# Patient Record
Sex: FEMALE | Race: WHITE | Employment: OTHER | URBAN - METROPOLITAN AREA
[De-identification: names, ages, dates, MRNs, and addresses within clinical notes are randomized per-mention and may not be internally consistent; named-entity substitution may affect disease eponyms.]

---

## 2017-01-01 ENCOUNTER — DOCUMENTATION ONLY (OUTPATIENT)
Dept: CARDIOLOGY | Facility: CLINIC | Age: 82
End: 2017-01-01

## 2017-01-01 ENCOUNTER — HOSPITAL ENCOUNTER (INPATIENT)
Facility: CLINIC | Age: 82
LOS: 11 days | DRG: 291 | End: 2017-05-02
Attending: EMERGENCY MEDICINE | Admitting: INTERNAL MEDICINE
Payer: MEDICARE

## 2017-01-01 ENCOUNTER — APPOINTMENT (OUTPATIENT)
Dept: CARDIOLOGY | Facility: CLINIC | Age: 82
End: 2017-01-01
Attending: INTERNAL MEDICINE
Payer: MEDICARE

## 2017-01-01 ENCOUNTER — CARE COORDINATION (OUTPATIENT)
Dept: CARDIOLOGY | Facility: CLINIC | Age: 82
End: 2017-01-01

## 2017-01-01 ENCOUNTER — TELEPHONE (OUTPATIENT)
Dept: CARDIOLOGY | Facility: CLINIC | Age: 82
End: 2017-01-01

## 2017-01-01 ENCOUNTER — APPOINTMENT (OUTPATIENT)
Dept: PHYSICAL THERAPY | Facility: CLINIC | Age: 82
DRG: 291 | End: 2017-01-01
Payer: MEDICARE

## 2017-01-01 ENCOUNTER — ALLIED HEALTH/NURSE VISIT (OUTPATIENT)
Dept: CARDIOLOGY | Facility: CLINIC | Age: 82
End: 2017-01-01

## 2017-01-01 ENCOUNTER — APPOINTMENT (OUTPATIENT)
Dept: PHYSICAL THERAPY | Facility: CLINIC | Age: 82
End: 2017-01-01
Attending: INTERNAL MEDICINE
Payer: MEDICARE

## 2017-01-01 ENCOUNTER — OFFICE VISIT (OUTPATIENT)
Dept: CARDIOLOGY | Facility: CLINIC | Age: 82
End: 2017-01-01
Attending: PHYSICIAN ASSISTANT
Payer: MEDICARE

## 2017-01-01 ENCOUNTER — CARE COORDINATION (OUTPATIENT)
Dept: CARE COORDINATION | Facility: CLINIC | Age: 82
End: 2017-01-01

## 2017-01-01 ENCOUNTER — HOSPITAL ENCOUNTER (OUTPATIENT)
Facility: CLINIC | Age: 82
Setting detail: OBSERVATION
Discharge: HOME | End: 2017-04-19
Attending: EMERGENCY MEDICINE | Admitting: INTERNAL MEDICINE
Payer: MEDICARE

## 2017-01-01 ENCOUNTER — APPOINTMENT (OUTPATIENT)
Dept: GENERAL RADIOLOGY | Facility: CLINIC | Age: 82
DRG: 291 | End: 2017-01-01
Attending: EMERGENCY MEDICINE
Payer: MEDICARE

## 2017-01-01 ENCOUNTER — ALLIED HEALTH/NURSE VISIT (OUTPATIENT)
Dept: CARDIOLOGY | Facility: CLINIC | Age: 82
End: 2017-01-01
Payer: MEDICARE

## 2017-01-01 ENCOUNTER — APPOINTMENT (OUTPATIENT)
Dept: PHYSICAL THERAPY | Facility: CLINIC | Age: 82
DRG: 291 | End: 2017-01-01
Attending: INTERNAL MEDICINE
Payer: MEDICARE

## 2017-01-01 ENCOUNTER — PRE VISIT (OUTPATIENT)
Dept: CARDIOLOGY | Facility: CLINIC | Age: 82
End: 2017-01-01

## 2017-01-01 ENCOUNTER — HOSPITAL ENCOUNTER (OUTPATIENT)
Facility: CLINIC | Age: 82
Discharge: HOME | End: 2017-04-08
Attending: INTERNAL MEDICINE | Admitting: INTERNAL MEDICINE
Payer: MEDICARE

## 2017-01-01 ENCOUNTER — APPOINTMENT (OUTPATIENT)
Dept: GENERAL RADIOLOGY | Facility: CLINIC | Age: 82
DRG: 291 | End: 2017-01-01
Attending: INTERNAL MEDICINE
Payer: MEDICARE

## 2017-01-01 ENCOUNTER — APPOINTMENT (OUTPATIENT)
Dept: GENERAL RADIOLOGY | Facility: CLINIC | Age: 82
End: 2017-01-01
Attending: EMERGENCY MEDICINE
Payer: MEDICARE

## 2017-01-01 ENCOUNTER — CARE COORDINATION (OUTPATIENT)
Dept: LAB | Facility: CLINIC | Age: 82
End: 2017-01-01

## 2017-01-01 ENCOUNTER — OFFICE VISIT (OUTPATIENT)
Dept: CARDIOLOGY | Facility: CLINIC | Age: 82
End: 2017-01-01
Payer: MEDICARE

## 2017-01-01 ENCOUNTER — HOSPITAL ENCOUNTER (OUTPATIENT)
Dept: CARDIOLOGY | Facility: CLINIC | Age: 82
Discharge: HOME | End: 2017-02-09
Attending: PHYSICIAN ASSISTANT | Admitting: PHYSICIAN ASSISTANT
Payer: MEDICARE

## 2017-01-01 ENCOUNTER — APPOINTMENT (OUTPATIENT)
Dept: CT IMAGING | Facility: CLINIC | Age: 82
DRG: 291 | End: 2017-01-01
Attending: EMERGENCY MEDICINE
Payer: MEDICARE

## 2017-01-01 ENCOUNTER — HOSPITAL ENCOUNTER (INPATIENT)
Facility: CLINIC | Age: 82
LOS: 4 days | Discharge: HOME HEALTH | DRG: 291 | End: 2017-01-24
Attending: EMERGENCY MEDICINE | Admitting: HOSPITALIST
Payer: MEDICARE

## 2017-01-01 VITALS
RESPIRATION RATE: 16 BRPM | WEIGHT: 113.3 LBS | BODY MASS INDEX: 20.07 KG/M2 | SYSTOLIC BLOOD PRESSURE: 122 MMHG | HEART RATE: 76 BPM | DIASTOLIC BLOOD PRESSURE: 78 MMHG | HEIGHT: 63 IN

## 2017-01-01 VITALS
SYSTOLIC BLOOD PRESSURE: 137 MMHG | BODY MASS INDEX: 23.54 KG/M2 | HEIGHT: 61 IN | WEIGHT: 124.7 LBS | DIASTOLIC BLOOD PRESSURE: 89 MMHG | HEART RATE: 60 BPM

## 2017-01-01 VITALS
OXYGEN SATURATION: 95 % | SYSTOLIC BLOOD PRESSURE: 116 MMHG | BODY MASS INDEX: 22.01 KG/M2 | WEIGHT: 116.6 LBS | DIASTOLIC BLOOD PRESSURE: 62 MMHG | HEIGHT: 61 IN | HEART RATE: 60 BPM

## 2017-01-01 VITALS
HEART RATE: 70 BPM | WEIGHT: 114 LBS | RESPIRATION RATE: 16 BRPM | BODY MASS INDEX: 20.2 KG/M2 | TEMPERATURE: 98.6 F | DIASTOLIC BLOOD PRESSURE: 91 MMHG | SYSTOLIC BLOOD PRESSURE: 141 MMHG | HEIGHT: 63 IN | OXYGEN SATURATION: 40 %

## 2017-01-01 VITALS
HEART RATE: 60 BPM | BODY MASS INDEX: 22.48 KG/M2 | DIASTOLIC BLOOD PRESSURE: 70 MMHG | SYSTOLIC BLOOD PRESSURE: 112 MMHG | HEIGHT: 61 IN | WEIGHT: 119.1 LBS

## 2017-01-01 VITALS
HEART RATE: 96 BPM | OXYGEN SATURATION: 93 % | TEMPERATURE: 97.9 F | RESPIRATION RATE: 16 BRPM | SYSTOLIC BLOOD PRESSURE: 149 MMHG | WEIGHT: 115.8 LBS | HEIGHT: 61 IN | BODY MASS INDEX: 21.86 KG/M2 | DIASTOLIC BLOOD PRESSURE: 63 MMHG

## 2017-01-01 VITALS
HEIGHT: 61 IN | HEART RATE: 68 BPM | BODY MASS INDEX: 23.39 KG/M2 | DIASTOLIC BLOOD PRESSURE: 72 MMHG | WEIGHT: 123.9 LBS | SYSTOLIC BLOOD PRESSURE: 118 MMHG

## 2017-01-01 VITALS
OXYGEN SATURATION: 96 % | RESPIRATION RATE: 17 BRPM | SYSTOLIC BLOOD PRESSURE: 111 MMHG | BODY MASS INDEX: 18.63 KG/M2 | DIASTOLIC BLOOD PRESSURE: 60 MMHG | TEMPERATURE: 98 F | HEART RATE: 90 BPM | WEIGHT: 105.16 LBS

## 2017-01-01 VITALS
WEIGHT: 124.12 LBS | RESPIRATION RATE: 16 BRPM | BODY MASS INDEX: 23.43 KG/M2 | HEIGHT: 61 IN | SYSTOLIC BLOOD PRESSURE: 140 MMHG | DIASTOLIC BLOOD PRESSURE: 73 MMHG | HEART RATE: 108 BPM | OXYGEN SATURATION: 95 % | TEMPERATURE: 97 F

## 2017-01-01 VITALS
WEIGHT: 124.2 LBS | HEART RATE: 59 BPM | DIASTOLIC BLOOD PRESSURE: 91 MMHG | SYSTOLIC BLOOD PRESSURE: 115 MMHG | BODY MASS INDEX: 23.45 KG/M2 | HEIGHT: 61 IN

## 2017-01-01 VITALS
DIASTOLIC BLOOD PRESSURE: 89 MMHG | SYSTOLIC BLOOD PRESSURE: 137 MMHG | HEIGHT: 61 IN | HEART RATE: 60 BPM | BODY MASS INDEX: 23.41 KG/M2 | WEIGHT: 124 LBS

## 2017-01-01 VITALS
SYSTOLIC BLOOD PRESSURE: 142 MMHG | HEART RATE: 66 BPM | DIASTOLIC BLOOD PRESSURE: 78 MMHG | WEIGHT: 119.4 LBS | HEIGHT: 61 IN | BODY MASS INDEX: 22.54 KG/M2

## 2017-01-01 DIAGNOSIS — I10 ESSENTIAL HYPERTENSION: ICD-10-CM

## 2017-01-01 DIAGNOSIS — I48.19 PERSISTENT ATRIAL FIBRILLATION (H): ICD-10-CM

## 2017-01-01 DIAGNOSIS — I48.0 PAROXYSMAL A-FIB (H): Primary | ICD-10-CM

## 2017-01-01 DIAGNOSIS — I44.2 CHB (COMPLETE HEART BLOCK) (H): ICD-10-CM

## 2017-01-01 DIAGNOSIS — R53.81 PHYSICAL DECONDITIONING: Primary | ICD-10-CM

## 2017-01-01 DIAGNOSIS — I50.32 CHRONIC DIASTOLIC CHF (CONGESTIVE HEART FAILURE) (H): Primary | ICD-10-CM

## 2017-01-01 DIAGNOSIS — Z95.0 CARDIAC PACEMAKER IN SITU: ICD-10-CM

## 2017-01-01 DIAGNOSIS — I50.31 ACUTE DIASTOLIC CONGESTIVE HEART FAILURE (H): Primary | ICD-10-CM

## 2017-01-01 DIAGNOSIS — Z95.0 PACEMAKER: Primary | ICD-10-CM

## 2017-01-01 DIAGNOSIS — R06.03 ACUTE RESPIRATORY DISTRESS: ICD-10-CM

## 2017-01-01 DIAGNOSIS — Z95.0 CARDIAC PACEMAKER IN SITU: Primary | ICD-10-CM

## 2017-01-01 DIAGNOSIS — I50.32 CHRONIC DIASTOLIC CONGESTIVE HEART FAILURE (H): Primary | ICD-10-CM

## 2017-01-01 DIAGNOSIS — I48.0 PAROXYSMAL A-FIB (H): ICD-10-CM

## 2017-01-01 DIAGNOSIS — Z95.0 PACEMAKER: ICD-10-CM

## 2017-01-01 DIAGNOSIS — I50.31 ACUTE DIASTOLIC CONGESTIVE HEART FAILURE (H): ICD-10-CM

## 2017-01-01 DIAGNOSIS — I50.33 ACUTE ON CHRONIC DIASTOLIC CONGESTIVE HEART FAILURE (H): Primary | ICD-10-CM

## 2017-01-01 DIAGNOSIS — R55 SYNCOPE, UNSPECIFIED SYNCOPE TYPE: ICD-10-CM

## 2017-01-01 DIAGNOSIS — I49.5 SSS (SICK SINUS SYNDROME) (H): ICD-10-CM

## 2017-01-01 DIAGNOSIS — I35.0 NONRHEUMATIC AORTIC VALVE STENOSIS: ICD-10-CM

## 2017-01-01 DIAGNOSIS — I50.32 CHRONIC DIASTOLIC CHF (CONGESTIVE HEART FAILURE) (H): ICD-10-CM

## 2017-01-01 DIAGNOSIS — I50.9 CHF (CONGESTIVE HEART FAILURE) (H): ICD-10-CM

## 2017-01-01 DIAGNOSIS — A41.9 SEVERE SEPSIS (H): ICD-10-CM

## 2017-01-01 DIAGNOSIS — R09.02 HYPOXIA: ICD-10-CM

## 2017-01-01 DIAGNOSIS — I48.0 PAROXYSMAL ATRIAL FIBRILLATION (H): Primary | ICD-10-CM

## 2017-01-01 DIAGNOSIS — I07.1 SEVERE TRICUSPID REGURGITATION: ICD-10-CM

## 2017-01-01 DIAGNOSIS — I34.0 NON-RHEUMATIC MITRAL REGURGITATION: ICD-10-CM

## 2017-01-01 DIAGNOSIS — I50.9 CONGESTIVE HEART FAILURE, UNSPECIFIED CONGESTIVE HEART FAILURE CHRONICITY, UNSPECIFIED CONGESTIVE HEART FAILURE TYPE: ICD-10-CM

## 2017-01-01 DIAGNOSIS — I48.19 PERSISTENT ATRIAL FIBRILLATION (H): Primary | ICD-10-CM

## 2017-01-01 DIAGNOSIS — I49.5 SINUS NODE DYSFUNCTION (H): ICD-10-CM

## 2017-01-01 DIAGNOSIS — I50.32 CHRONIC DIASTOLIC CONGESTIVE HEART FAILURE (H): ICD-10-CM

## 2017-01-01 DIAGNOSIS — I48.91 ATRIAL FIBRILLATION (H): Primary | ICD-10-CM

## 2017-01-01 DIAGNOSIS — J18.9 PNEUMONIA OF BOTH LUNGS DUE TO INFECTIOUS ORGANISM, UNSPECIFIED PART OF LUNG: ICD-10-CM

## 2017-01-01 DIAGNOSIS — R65.20 SEVERE SEPSIS (H): ICD-10-CM

## 2017-01-01 DIAGNOSIS — I50.9 CHF (CONGESTIVE HEART FAILURE) (H): Primary | ICD-10-CM

## 2017-01-01 LAB
ALBUMIN SERPL-MCNC: 2.9 G/DL (ref 3.4–5)
ALBUMIN SERPL-MCNC: 3.5 G/DL (ref 3.4–5)
ALBUMIN SERPL-MCNC: 3.6 G/DL (ref 3.4–5)
ALBUMIN SERPL-MCNC: 3.7 G/DL (ref 3.4–5)
ALBUMIN SERPL-MCNC: NORMAL G/DL (ref 3.4–5)
ALBUMIN UR-MCNC: 10 MG/DL
ALP SERPL-CCNC: 101 U/L (ref 40–150)
ALP SERPL-CCNC: 102 U/L (ref 40–150)
ALP SERPL-CCNC: 121 U/L (ref 40–150)
ALP SERPL-CCNC: 98 U/L (ref 40–150)
ALP SERPL-CCNC: NORMAL U/L (ref 40–150)
ALT SERPL W P-5'-P-CCNC: 34 U/L (ref 0–50)
ALT SERPL W P-5'-P-CCNC: 35 U/L (ref 0–50)
ALT SERPL W P-5'-P-CCNC: 39 U/L (ref 0–50)
ALT SERPL W P-5'-P-CCNC: 44 U/L (ref 0–50)
ALT SERPL W P-5'-P-CCNC: NORMAL U/L (ref 0–50)
ANION GAP SERPL CALCULATED.3IONS-SCNC: 10 MMOL/L (ref 3–14)
ANION GAP SERPL CALCULATED.3IONS-SCNC: 11 MMOL/L (ref 3–14)
ANION GAP SERPL CALCULATED.3IONS-SCNC: 11 MMOL/L (ref 3–14)
ANION GAP SERPL CALCULATED.3IONS-SCNC: 11.7 MMOL/L (ref 6–17)
ANION GAP SERPL CALCULATED.3IONS-SCNC: 12 MMOL/L (ref 3–14)
ANION GAP SERPL CALCULATED.3IONS-SCNC: 12.7 MMOL/L (ref 6–17)
ANION GAP SERPL CALCULATED.3IONS-SCNC: 4 MMOL/L (ref 3–14)
ANION GAP SERPL CALCULATED.3IONS-SCNC: 5 MMOL/L (ref 3–14)
ANION GAP SERPL CALCULATED.3IONS-SCNC: 7 MMOL/L (ref 3–14)
ANION GAP SERPL CALCULATED.3IONS-SCNC: 8 MMOL/L (ref 3–14)
ANION GAP SERPL CALCULATED.3IONS-SCNC: 9 MMOL/L (ref 3–14)
ANION GAP SERPL CALCULATED.3IONS-SCNC: 9.4 MMOL/L (ref 6–17)
ANION GAP SERPL CALCULATED.3IONS-SCNC: 9.6 MMOL/L (ref 6–17)
ANION GAP SERPL CALCULATED.3IONS-SCNC: NORMAL MMOL/L (ref 6–17)
ANION GAP SERPL CALCULATED.3IONS-SCNC: NORMAL MMOL/L (ref 6–17)
APPEARANCE UR: CLEAR
APTT PPP: 29 SEC (ref 22–37)
APTT PPP: NORMAL SEC (ref 22–37)
AST SERPL W P-5'-P-CCNC: 36 U/L (ref 0–45)
AST SERPL W P-5'-P-CCNC: 44 U/L (ref 0–45)
AST SERPL W P-5'-P-CCNC: 45 U/L (ref 0–45)
AST SERPL W P-5'-P-CCNC: 46 U/L (ref 0–45)
AST SERPL W P-5'-P-CCNC: NORMAL U/L (ref 0–45)
BACTERIA SPEC CULT: NO GROWTH
BACTERIA SPEC CULT: NO GROWTH
BASE DEFICIT BLDA-SCNC: 0.7 MMOL/L
BASOPHILS # BLD AUTO: 0 10E9/L (ref 0–0.2)
BASOPHILS # BLD AUTO: 0 10E9/L (ref 0–0.2)
BASOPHILS # BLD AUTO: 0.2 10E9/L (ref 0–0.2)
BASOPHILS NFR BLD AUTO: 0.2 %
BASOPHILS NFR BLD AUTO: 0.3 %
BASOPHILS NFR BLD AUTO: 1 %
BILIRUB SERPL-MCNC: 0.6 MG/DL (ref 0.2–1.3)
BILIRUB SERPL-MCNC: 0.7 MG/DL (ref 0.2–1.3)
BILIRUB SERPL-MCNC: 0.9 MG/DL (ref 0.2–1.3)
BILIRUB SERPL-MCNC: 0.9 MG/DL (ref 0.2–1.3)
BILIRUB SERPL-MCNC: NORMAL MG/DL (ref 0.2–1.3)
BILIRUB UR QL STRIP: NEGATIVE
BUN SERPL-MCNC: 21 MG/DL (ref 7–30)
BUN SERPL-MCNC: 21 MG/DL (ref 7–30)
BUN SERPL-MCNC: 22 MG/DL (ref 7–30)
BUN SERPL-MCNC: 23 MG/DL (ref 7–30)
BUN SERPL-MCNC: 24 MG/DL (ref 7–30)
BUN SERPL-MCNC: 24 MG/DL (ref 7–30)
BUN SERPL-MCNC: 25 MG/DL (ref 7–30)
BUN SERPL-MCNC: 27 MG/DL (ref 7–30)
BUN SERPL-MCNC: 27 MG/DL (ref 7–30)
BUN SERPL-MCNC: 29 MG/DL (ref 7–30)
BUN SERPL-MCNC: 30 MG/DL (ref 7–30)
BUN SERPL-MCNC: 30 MG/DL (ref 7–30)
BUN SERPL-MCNC: 31 MG/DL (ref 7–30)
BUN SERPL-MCNC: 31 MG/DL (ref 7–30)
BUN SERPL-MCNC: 32 MG/DL (ref 7–30)
BUN SERPL-MCNC: 32 MG/DL (ref 7–30)
BUN SERPL-MCNC: 35 MG/DL (ref 7–30)
BUN SERPL-MCNC: 37 MG/DL (ref 7–30)
BUN SERPL-MCNC: 42 MG/DL (ref 7–30)
BUN SERPL-MCNC: NORMAL MG/DL (ref 7–30)
BUN SERPL-MCNC: NORMAL MG/DL (ref 7–30)
CALCIUM SERPL-MCNC: 10 MG/DL (ref 8.5–10.5)
CALCIUM SERPL-MCNC: 10.1 MG/DL (ref 8.5–10.5)
CALCIUM SERPL-MCNC: 7.9 MG/DL (ref 8.5–10.1)
CALCIUM SERPL-MCNC: 8.5 MG/DL (ref 8.5–10.1)
CALCIUM SERPL-MCNC: 8.7 MG/DL (ref 8.5–10.1)
CALCIUM SERPL-MCNC: 8.8 MG/DL (ref 8.5–10.1)
CALCIUM SERPL-MCNC: 8.9 MG/DL (ref 8.5–10.1)
CALCIUM SERPL-MCNC: 9 MG/DL (ref 8.5–10.1)
CALCIUM SERPL-MCNC: 9 MG/DL (ref 8.5–10.1)
CALCIUM SERPL-MCNC: 9.1 MG/DL (ref 8.5–10.1)
CALCIUM SERPL-MCNC: 9.2 MG/DL (ref 8.5–10.1)
CALCIUM SERPL-MCNC: 9.2 MG/DL (ref 8.5–10.1)
CALCIUM SERPL-MCNC: 9.3 MG/DL (ref 8.5–10.1)
CALCIUM SERPL-MCNC: 9.5 MG/DL (ref 8.5–10.5)
CALCIUM SERPL-MCNC: 9.9 MG/DL (ref 8.5–10.5)
CALCIUM SERPL-MCNC: NORMAL MG/DL (ref 8.5–10.1)
CALCIUM SERPL-MCNC: NORMAL MG/DL (ref 8.5–10.1)
CHLORIDE SERPL-SCNC: 100 MMOL/L (ref 94–109)
CHLORIDE SERPL-SCNC: 100 MMOL/L (ref 98–107)
CHLORIDE SERPL-SCNC: 102 MMOL/L (ref 94–109)
CHLORIDE SERPL-SCNC: 102 MMOL/L (ref 94–109)
CHLORIDE SERPL-SCNC: 103 MMOL/L (ref 94–109)
CHLORIDE SERPL-SCNC: 103 MMOL/L (ref 98–107)
CHLORIDE SERPL-SCNC: 104 MMOL/L (ref 94–109)
CHLORIDE SERPL-SCNC: 104 MMOL/L (ref 94–109)
CHLORIDE SERPL-SCNC: 107 MMOL/L (ref 94–109)
CHLORIDE SERPL-SCNC: 96 MMOL/L (ref 98–107)
CHLORIDE SERPL-SCNC: 96 MMOL/L (ref 98–107)
CHLORIDE SERPL-SCNC: 98 MMOL/L (ref 94–109)
CHLORIDE SERPL-SCNC: 98 MMOL/L (ref 94–109)
CHLORIDE SERPL-SCNC: 99 MMOL/L (ref 94–109)
CHLORIDE SERPL-SCNC: 99 MMOL/L (ref 94–109)
CHLORIDE SERPL-SCNC: NORMAL MMOL/L (ref 94–109)
CHLORIDE SERPL-SCNC: NORMAL MMOL/L (ref 94–109)
CO2 BLDCOV-SCNC: 26 MMOL/L (ref 21–28)
CO2 SERPL-SCNC: 23 MMOL/L (ref 20–32)
CO2 SERPL-SCNC: 23 MMOL/L (ref 20–32)
CO2 SERPL-SCNC: 24 MMOL/L (ref 20–32)
CO2 SERPL-SCNC: 26 MMOL/L (ref 20–32)
CO2 SERPL-SCNC: 27 MMOL/L (ref 20–32)
CO2 SERPL-SCNC: 28 MMOL/L (ref 20–32)
CO2 SERPL-SCNC: 29 MMOL/L (ref 20–32)
CO2 SERPL-SCNC: 29 MMOL/L (ref 23–29)
CO2 SERPL-SCNC: 31 MMOL/L (ref 20–32)
CO2 SERPL-SCNC: 32 MMOL/L (ref 20–32)
CO2 SERPL-SCNC: 33 MMOL/L (ref 20–32)
CO2 SERPL-SCNC: 33 MMOL/L (ref 20–32)
CO2 SERPL-SCNC: 33 MMOL/L (ref 23–29)
CO2 SERPL-SCNC: NORMAL MMOL/L (ref 20–32)
CO2 SERPL-SCNC: NORMAL MMOL/L (ref 20–32)
COLOR UR AUTO: ABNORMAL
CREAT SERPL-MCNC: 0.92 MG/DL (ref 0.52–1.04)
CREAT SERPL-MCNC: 0.99 MG/DL (ref 0.52–1.04)
CREAT SERPL-MCNC: 1.01 MG/DL (ref 0.52–1.04)
CREAT SERPL-MCNC: 1.03 MG/DL (ref 0.52–1.04)
CREAT SERPL-MCNC: 1.05 MG/DL (ref 0.52–1.04)
CREAT SERPL-MCNC: 1.06 MG/DL (ref 0.52–1.04)
CREAT SERPL-MCNC: 1.07 MG/DL (ref 0.52–1.04)
CREAT SERPL-MCNC: 1.11 MG/DL (ref 0.52–1.04)
CREAT SERPL-MCNC: 1.14 MG/DL (ref 0.52–1.04)
CREAT SERPL-MCNC: 1.15 MG/DL (ref 0.52–1.04)
CREAT SERPL-MCNC: 1.15 MG/DL (ref 0.52–1.04)
CREAT SERPL-MCNC: 1.19 MG/DL (ref 0.7–1.3)
CREAT SERPL-MCNC: 1.23 MG/DL (ref 0.52–1.04)
CREAT SERPL-MCNC: 1.27 MG/DL (ref 0.52–1.04)
CREAT SERPL-MCNC: 1.3 MG/DL (ref 0.7–1.3)
CREAT SERPL-MCNC: 1.33 MG/DL (ref 0.52–1.04)
CREAT SERPL-MCNC: 1.33 MG/DL (ref 0.52–1.04)
CREAT SERPL-MCNC: 1.34 MG/DL (ref 0.52–1.04)
CREAT SERPL-MCNC: 1.36 MG/DL (ref 0.7–1.3)
CREAT SERPL-MCNC: 1.59 MG/DL (ref 0.7–1.3)
CREAT SERPL-MCNC: 1.63 MG/DL (ref 0.52–1.04)
CREAT SERPL-MCNC: NORMAL MG/DL (ref 0.52–1.04)
CREAT SERPL-MCNC: NORMAL MG/DL (ref 0.52–1.04)
DIFFERENTIAL METHOD BLD: ABNORMAL
DIFFERENTIAL METHOD BLD: NORMAL
EOSINOPHIL # BLD AUTO: 0 10E9/L (ref 0–0.7)
EOSINOPHIL # BLD AUTO: 0.1 10E9/L (ref 0–0.7)
EOSINOPHIL # BLD AUTO: 0.1 10E9/L (ref 0–0.7)
EOSINOPHIL NFR BLD AUTO: 0 %
EOSINOPHIL NFR BLD AUTO: 0.7 %
EOSINOPHIL NFR BLD AUTO: 1.4 %
ERYTHROCYTE [DISTWIDTH] IN BLOOD BY AUTOMATED COUNT: 14.9 % (ref 10–15)
ERYTHROCYTE [DISTWIDTH] IN BLOOD BY AUTOMATED COUNT: 15.8 % (ref 10–15)
ERYTHROCYTE [DISTWIDTH] IN BLOOD BY AUTOMATED COUNT: 15.9 % (ref 10–15)
ERYTHROCYTE [DISTWIDTH] IN BLOOD BY AUTOMATED COUNT: 16 % (ref 10–15)
ERYTHROCYTE [DISTWIDTH] IN BLOOD BY AUTOMATED COUNT: 16 % (ref 10–15)
ERYTHROCYTE [DISTWIDTH] IN BLOOD BY AUTOMATED COUNT: 16.2 % (ref 10–15)
ERYTHROCYTE [DISTWIDTH] IN BLOOD BY AUTOMATED COUNT: 16.3 % (ref 10–15)
ERYTHROCYTE [DISTWIDTH] IN BLOOD BY AUTOMATED COUNT: 16.4 % (ref 10–15)
ERYTHROCYTE [DISTWIDTH] IN BLOOD BY AUTOMATED COUNT: 16.8 % (ref 10–15)
ERYTHROCYTE [DISTWIDTH] IN BLOOD BY AUTOMATED COUNT: 17 % (ref 10–15)
ERYTHROCYTE [DISTWIDTH] IN BLOOD BY AUTOMATED COUNT: NORMAL % (ref 10–15)
GFR SERPL CREATININE-BSD FRML MDRD: 30 ML/MIN/1.7M2
GFR SERPL CREATININE-BSD FRML MDRD: 31 ML/MIN/1.7M2
GFR SERPL CREATININE-BSD FRML MDRD: 37 ML/MIN/1.7M2
GFR SERPL CREATININE-BSD FRML MDRD: 37 ML/MIN/1.7M2
GFR SERPL CREATININE-BSD FRML MDRD: 38 ML/MIN/1.7M2
GFR SERPL CREATININE-BSD FRML MDRD: 38 ML/MIN/1.7M2
GFR SERPL CREATININE-BSD FRML MDRD: 39 ML/MIN/1.7M2
GFR SERPL CREATININE-BSD FRML MDRD: 40 ML/MIN/1.7M2
GFR SERPL CREATININE-BSD FRML MDRD: 41 ML/MIN/1.7M2
GFR SERPL CREATININE-BSD FRML MDRD: 43 ML/MIN/1.7M2
GFR SERPL CREATININE-BSD FRML MDRD: 44 ML/MIN/1.7M2
GFR SERPL CREATININE-BSD FRML MDRD: 44 ML/MIN/1.7M2
GFR SERPL CREATININE-BSD FRML MDRD: 45 ML/MIN/1.7M2
GFR SERPL CREATININE-BSD FRML MDRD: 46 ML/MIN/1.7M2
GFR SERPL CREATININE-BSD FRML MDRD: 48 ML/MIN/1.7M2
GFR SERPL CREATININE-BSD FRML MDRD: 49 ML/MIN/1.7M2
GFR SERPL CREATININE-BSD FRML MDRD: 49 ML/MIN/1.7M2
GFR SERPL CREATININE-BSD FRML MDRD: 50 ML/MIN/1.7M2
GFR SERPL CREATININE-BSD FRML MDRD: 52 ML/MIN/1.7M2
GFR SERPL CREATININE-BSD FRML MDRD: 53 ML/MIN/1.7M2
GFR SERPL CREATININE-BSD FRML MDRD: 58 ML/MIN/1.7M2
GFR SERPL CREATININE-BSD FRML MDRD: NORMAL ML/MIN/1.7M2
GFR SERPL CREATININE-BSD FRML MDRD: NORMAL ML/MIN/1.7M2
GLUCOSE BLDC GLUCOMTR-MCNC: 138 MG/DL (ref 70–99)
GLUCOSE BLDC GLUCOMTR-MCNC: 139 MG/DL (ref 70–99)
GLUCOSE BLDC GLUCOMTR-MCNC: 80 MG/DL (ref 70–99)
GLUCOSE SERPL-MCNC: 101 MG/DL (ref 70–99)
GLUCOSE SERPL-MCNC: 111 MG/DL (ref 70–99)
GLUCOSE SERPL-MCNC: 111 MG/DL (ref 70–99)
GLUCOSE SERPL-MCNC: 120 MG/DL (ref 70–105)
GLUCOSE SERPL-MCNC: 125 MG/DL (ref 70–99)
GLUCOSE SERPL-MCNC: 152 MG/DL (ref 70–99)
GLUCOSE SERPL-MCNC: 157 MG/DL (ref 70–99)
GLUCOSE SERPL-MCNC: 79 MG/DL (ref 70–99)
GLUCOSE SERPL-MCNC: 81 MG/DL (ref 70–99)
GLUCOSE SERPL-MCNC: 83 MG/DL (ref 70–99)
GLUCOSE SERPL-MCNC: 86 MG/DL (ref 70–99)
GLUCOSE SERPL-MCNC: 87 MG/DL (ref 70–99)
GLUCOSE SERPL-MCNC: 87 MG/DL (ref 70–99)
GLUCOSE SERPL-MCNC: 88 MG/DL (ref 70–99)
GLUCOSE SERPL-MCNC: 89 MG/DL (ref 70–105)
GLUCOSE SERPL-MCNC: 97 MG/DL (ref 70–105)
GLUCOSE SERPL-MCNC: 97 MG/DL (ref 70–99)
GLUCOSE SERPL-MCNC: 98 MG/DL (ref 70–105)
GLUCOSE SERPL-MCNC: 99 MG/DL (ref 70–99)
GLUCOSE SERPL-MCNC: NORMAL MG/DL (ref 70–99)
GLUCOSE SERPL-MCNC: NORMAL MG/DL (ref 70–99)
GLUCOSE UR STRIP-MCNC: NEGATIVE MG/DL
HBA1C MFR BLD: 5.1 % (ref 4.3–6)
HCO3 BLD-SCNC: 23 MMOL/L (ref 21–28)
HCT VFR BLD AUTO: 34.2 % (ref 35–47)
HCT VFR BLD AUTO: 34.4 % (ref 35–47)
HCT VFR BLD AUTO: 34.5 % (ref 35–47)
HCT VFR BLD AUTO: 35 % (ref 35–47)
HCT VFR BLD AUTO: 35.1 % (ref 35–47)
HCT VFR BLD AUTO: 35.3 % (ref 35–47)
HCT VFR BLD AUTO: 35.9 % (ref 35–47)
HCT VFR BLD AUTO: 39.7 % (ref 35–47)
HCT VFR BLD AUTO: 40.1 % (ref 35–47)
HCT VFR BLD AUTO: 40.7 % (ref 35–47)
HCT VFR BLD AUTO: 41.3 % (ref 35–47)
HCT VFR BLD AUTO: 49.5 % (ref 35–47)
HCT VFR BLD AUTO: NORMAL % (ref 35–47)
HGB BLD-MCNC: 11.3 G/DL (ref 11.7–15.7)
HGB BLD-MCNC: 11.3 G/DL (ref 11.7–15.7)
HGB BLD-MCNC: 11.6 G/DL (ref 11.7–15.7)
HGB BLD-MCNC: 11.8 G/DL (ref 11.7–15.7)
HGB BLD-MCNC: 12 G/DL (ref 11.7–15.7)
HGB BLD-MCNC: 13.1 G/DL (ref 11.7–15.7)
HGB BLD-MCNC: 13.6 G/DL (ref 11.7–15.7)
HGB BLD-MCNC: 13.7 G/DL (ref 11.7–15.7)
HGB BLD-MCNC: 13.8 G/DL (ref 11.7–15.7)
HGB BLD-MCNC: 16.7 G/DL (ref 11.7–15.7)
HGB BLD-MCNC: NORMAL G/DL (ref 11.7–15.7)
HGB UR QL STRIP: ABNORMAL
IMM GRANULOCYTES # BLD: 0 10E9/L (ref 0–0.4)
IMM GRANULOCYTES # BLD: 0 10E9/L (ref 0–0.4)
IMM GRANULOCYTES NFR BLD: 0.2 %
IMM GRANULOCYTES NFR BLD: 0.3 %
INR PPP: 1.16 (ref 0.86–1.14)
INR PPP: 1.38 (ref 0.86–1.14)
INR PPP: NORMAL (ref 0.86–1.14)
INTERPRETATION ECG - MUSE: NORMAL
KETONES UR STRIP-MCNC: NEGATIVE MG/DL
LACTATE BLD-SCNC: 1.2 MMOL/L (ref 0.7–2.1)
LACTATE BLD-SCNC: 1.2 MMOL/L (ref 0.7–2.1)
LACTATE BLD-SCNC: 1.5 MMOL/L (ref 0.7–2.1)
LACTATE BLD-SCNC: 2 MMOL/L (ref 0.7–2.1)
LACTATE BLD-SCNC: 3.4 MMOL/L (ref 0.7–2.1)
LEUKOCYTE ESTERASE UR QL STRIP: NEGATIVE
LYMPHOCYTES # BLD AUTO: 2.3 10E9/L (ref 0.8–5.3)
LYMPHOCYTES # BLD AUTO: 2.3 10E9/L (ref 0.8–5.3)
LYMPHOCYTES # BLD AUTO: 6.9 10E9/L (ref 0.8–5.3)
LYMPHOCYTES NFR BLD AUTO: 19.5 %
LYMPHOCYTES NFR BLD AUTO: 24.5 %
LYMPHOCYTES NFR BLD AUTO: 39 %
Lab: NORMAL
Lab: NORMAL
MAGNESIUM SERPL-MCNC: 2 MG/DL (ref 1.6–2.3)
MAGNESIUM SERPL-MCNC: 2.2 MG/DL (ref 1.6–2.3)
MCH RBC QN AUTO: 32 PG (ref 26.5–33)
MCH RBC QN AUTO: 32.1 PG (ref 26.5–33)
MCH RBC QN AUTO: 32.3 PG (ref 26.5–33)
MCH RBC QN AUTO: 32.7 PG (ref 26.5–33)
MCH RBC QN AUTO: 32.8 PG (ref 26.5–33)
MCH RBC QN AUTO: 32.9 PG (ref 26.5–33)
MCH RBC QN AUTO: 32.9 PG (ref 26.5–33)
MCH RBC QN AUTO: 33 PG (ref 26.5–33)
MCH RBC QN AUTO: 33.1 PG (ref 26.5–33)
MCH RBC QN AUTO: 33.3 PG (ref 26.5–33)
MCH RBC QN AUTO: 33.4 PG (ref 26.5–33)
MCH RBC QN AUTO: 33.7 PG (ref 26.5–33)
MCH RBC QN AUTO: NORMAL PG (ref 26.5–33)
MCHC RBC AUTO-ENTMCNC: 32.7 G/DL (ref 31.5–36.5)
MCHC RBC AUTO-ENTMCNC: 32.8 G/DL (ref 31.5–36.5)
MCHC RBC AUTO-ENTMCNC: 32.9 G/DL (ref 31.5–36.5)
MCHC RBC AUTO-ENTMCNC: 33 G/DL (ref 31.5–36.5)
MCHC RBC AUTO-ENTMCNC: 33.4 G/DL (ref 31.5–36.5)
MCHC RBC AUTO-ENTMCNC: 33.6 G/DL (ref 31.5–36.5)
MCHC RBC AUTO-ENTMCNC: 33.6 G/DL (ref 31.5–36.5)
MCHC RBC AUTO-ENTMCNC: 33.7 G/DL (ref 31.5–36.5)
MCHC RBC AUTO-ENTMCNC: 34 G/DL (ref 31.5–36.5)
MCHC RBC AUTO-ENTMCNC: 34.3 G/DL (ref 31.5–36.5)
MCHC RBC AUTO-ENTMCNC: NORMAL G/DL (ref 31.5–36.5)
MCV RBC AUTO: 100 FL (ref 78–100)
MCV RBC AUTO: 97 FL (ref 78–100)
MCV RBC AUTO: 97 FL (ref 78–100)
MCV RBC AUTO: 98 FL (ref 78–100)
MCV RBC AUTO: 99 FL (ref 78–100)
MCV RBC AUTO: NORMAL FL (ref 78–100)
MICRO REPORT STATUS: NORMAL
MICRO REPORT STATUS: NORMAL
MONOCYTES # BLD AUTO: 0.9 10E9/L (ref 0–1.3)
MONOCYTES # BLD AUTO: 0.9 10E9/L (ref 0–1.3)
MONOCYTES # BLD AUTO: 1.2 10E9/L (ref 0–1.3)
MONOCYTES NFR BLD AUTO: 12.8 %
MONOCYTES NFR BLD AUTO: 5 %
MONOCYTES NFR BLD AUTO: 7.7 %
MUCOUS THREADS #/AREA URNS LPF: PRESENT /LPF
NEUTROPHILS # BLD AUTO: 5.7 10E9/L (ref 1.6–8.3)
NEUTROPHILS # BLD AUTO: 8.3 10E9/L (ref 1.6–8.3)
NEUTROPHILS # BLD AUTO: 9.7 10E9/L (ref 1.6–8.3)
NEUTROPHILS NFR BLD AUTO: 55 %
NEUTROPHILS NFR BLD AUTO: 60.9 %
NEUTROPHILS NFR BLD AUTO: 71.5 %
NITRATE UR QL: NEGATIVE
NRBC # BLD AUTO: 0 10*3/UL
NRBC # BLD AUTO: 0 10*3/UL
NRBC BLD AUTO-RTO: 0 /100
NRBC BLD AUTO-RTO: 0 /100
NT-PROBNP SERPL-MCNC: 1990 PG/ML (ref 0–1800)
NT-PROBNP SERPL-MCNC: 3778 PG/ML (ref 0–1800)
NT-PROBNP SERPL-MCNC: ABNORMAL PG/ML (ref 0–1800)
NT-PROBNP SERPL-MCNC: NORMAL PG/ML (ref 0–1800)
NT-PROBNP SERPL-MCNC: NORMAL PG/ML (ref 0–1800)
O2/TOTAL GAS SETTING VFR VENT: 100 %
OXYHGB MFR BLD: 96 % (ref 92–100)
PCO2 BLD: 34 MM HG (ref 35–45)
PCO2 BLDV: 62 MM HG (ref 40–50)
PH BLD: 7.44 PH (ref 7.35–7.45)
PH BLDV: 7.23 PH (ref 7.32–7.43)
PH UR STRIP: 5.5 PH (ref 5–7)
PLATELET # BLD AUTO: 148 10E9/L (ref 150–450)
PLATELET # BLD AUTO: 148 10E9/L (ref 150–450)
PLATELET # BLD AUTO: 174 10E9/L (ref 150–450)
PLATELET # BLD AUTO: 178 10E9/L (ref 150–450)
PLATELET # BLD AUTO: 182 10E9/L (ref 150–450)
PLATELET # BLD AUTO: 184 10E9/L (ref 150–450)
PLATELET # BLD AUTO: 193 10E9/L (ref 150–450)
PLATELET # BLD AUTO: 194 10E9/L (ref 150–450)
PLATELET # BLD AUTO: 197 10E9/L (ref 150–450)
PLATELET # BLD AUTO: 210 10E9/L (ref 150–450)
PLATELET # BLD AUTO: 237 10E9/L (ref 150–450)
PLATELET # BLD AUTO: 246 10E9/L (ref 150–450)
PLATELET # BLD AUTO: NORMAL 10E9/L (ref 150–450)
PLATELET # BLD EST: ABNORMAL 10*3/UL
PO2 BLD: 85 MM HG (ref 80–105)
PO2 BLDV: 49 MM HG (ref 25–47)
POTASSIUM SERPL-SCNC: 3.1 MMOL/L (ref 3.4–5.3)
POTASSIUM SERPL-SCNC: 3.2 MMOL/L (ref 3.4–5.3)
POTASSIUM SERPL-SCNC: 3.3 MMOL/L (ref 3.4–5.3)
POTASSIUM SERPL-SCNC: 3.5 MMOL/L (ref 3.4–5.3)
POTASSIUM SERPL-SCNC: 3.5 MMOL/L (ref 3.4–5.3)
POTASSIUM SERPL-SCNC: 3.6 MMOL/L (ref 3.4–5.3)
POTASSIUM SERPL-SCNC: 3.7 MMOL/L (ref 3.4–5.3)
POTASSIUM SERPL-SCNC: 3.7 MMOL/L (ref 3.5–5.1)
POTASSIUM SERPL-SCNC: 3.8 MMOL/L (ref 3.4–5.3)
POTASSIUM SERPL-SCNC: 3.9 MMOL/L (ref 3.4–5.3)
POTASSIUM SERPL-SCNC: 4 MMOL/L (ref 3.4–5.3)
POTASSIUM SERPL-SCNC: 4.1 MMOL/L (ref 3.4–5.3)
POTASSIUM SERPL-SCNC: 4.2 MMOL/L (ref 3.4–5.3)
POTASSIUM SERPL-SCNC: 4.3 MMOL/L (ref 3.4–5.3)
POTASSIUM SERPL-SCNC: 4.4 MMOL/L (ref 3.4–5.3)
POTASSIUM SERPL-SCNC: 4.4 MMOL/L (ref 3.5–5.1)
POTASSIUM SERPL-SCNC: 4.5 MMOL/L (ref 3.4–5.3)
POTASSIUM SERPL-SCNC: 4.5 MMOL/L (ref 3.4–5.3)
POTASSIUM SERPL-SCNC: 4.6 MMOL/L (ref 3.5–5.1)
POTASSIUM SERPL-SCNC: 4.7 MMOL/L (ref 3.5–5.1)
POTASSIUM SERPL-SCNC: 4.9 MMOL/L (ref 3.4–5.3)
POTASSIUM SERPL-SCNC: NORMAL MMOL/L (ref 3.4–5.3)
POTASSIUM SERPL-SCNC: NORMAL MMOL/L (ref 3.4–5.3)
PROCALCITONIN SERPL-MCNC: 0.13 NG/ML
PROCALCITONIN SERPL-MCNC: 0.28 NG/ML
PROCALCITONIN SERPL-MCNC: 0.28 NG/ML
PROCALCITONIN SERPL-MCNC: 0.48 NG/ML
PROCALCITONIN SERPL-MCNC: 0.93 NG/ML
PROCALCITONIN SERPL-MCNC: 1.09 NG/ML
PROT SERPL-MCNC: 6.3 G/DL (ref 6.8–8.8)
PROT SERPL-MCNC: 6.6 G/DL (ref 6.8–8.8)
PROT SERPL-MCNC: 6.9 G/DL (ref 6.8–8.8)
PROT SERPL-MCNC: 7 G/DL (ref 6.8–8.8)
PROT SERPL-MCNC: NORMAL G/DL (ref 6.8–8.8)
RBC # BLD AUTO: 3.46 10E12/L (ref 3.8–5.2)
RBC # BLD AUTO: 3.5 10E12/L (ref 3.8–5.2)
RBC # BLD AUTO: 3.52 10E12/L (ref 3.8–5.2)
RBC # BLD AUTO: 3.59 10E12/L (ref 3.8–5.2)
RBC # BLD AUTO: 3.6 10E12/L (ref 3.8–5.2)
RBC # BLD AUTO: 3.64 10E12/L (ref 3.8–5.2)
RBC # BLD AUTO: 3.65 10E12/L (ref 3.8–5.2)
RBC # BLD AUTO: 4.07 10E12/L (ref 3.8–5.2)
RBC # BLD AUTO: 4.1 10E12/L (ref 3.8–5.2)
RBC # BLD AUTO: 4.11 10E12/L (ref 3.8–5.2)
RBC # BLD AUTO: 4.19 10E12/L (ref 3.8–5.2)
RBC # BLD AUTO: 4.95 10E12/L (ref 3.8–5.2)
RBC # BLD AUTO: NORMAL 10E12/L (ref 3.8–5.2)
RBC #/AREA URNS AUTO: 1 /HPF (ref 0–2)
RBC MORPH BLD: ABNORMAL
SAO2 % BLDV FROM PO2: 76 %
SODIUM SERPL-SCNC: 133 MMOL/L (ref 136–145)
SODIUM SERPL-SCNC: 134 MMOL/L (ref 136–145)
SODIUM SERPL-SCNC: 135 MMOL/L (ref 133–144)
SODIUM SERPL-SCNC: 136 MMOL/L (ref 133–144)
SODIUM SERPL-SCNC: 137 MMOL/L (ref 133–144)
SODIUM SERPL-SCNC: 137 MMOL/L (ref 136–145)
SODIUM SERPL-SCNC: 137 MMOL/L (ref 136–145)
SODIUM SERPL-SCNC: 138 MMOL/L (ref 133–144)
SODIUM SERPL-SCNC: 139 MMOL/L (ref 133–144)
SODIUM SERPL-SCNC: 139 MMOL/L (ref 133–144)
SODIUM SERPL-SCNC: 140 MMOL/L (ref 133–144)
SODIUM SERPL-SCNC: 140 MMOL/L (ref 133–144)
SODIUM SERPL-SCNC: 141 MMOL/L (ref 133–144)
SODIUM SERPL-SCNC: 141 MMOL/L (ref 133–144)
SODIUM SERPL-SCNC: NORMAL MMOL/L (ref 133–144)
SODIUM SERPL-SCNC: NORMAL MMOL/L (ref 133–144)
SP GR UR STRIP: 1.01 (ref 1–1.03)
SPECIMEN SOURCE: NORMAL
SPECIMEN SOURCE: NORMAL
TROPONIN I SERPL-MCNC: 0.03 UG/L (ref 0–0.04)
TROPONIN I SERPL-MCNC: 0.03 UG/L (ref 0–0.04)
TROPONIN I SERPL-MCNC: 0.04 UG/L (ref 0–0.04)
TROPONIN I SERPL-MCNC: 0.07 UG/L (ref 0–0.04)
TROPONIN I SERPL-MCNC: 0.08 UG/L (ref 0–0.04)
TROPONIN I SERPL-MCNC: 0.14 UG/L (ref 0–0.04)
TROPONIN I SERPL-MCNC: NORMAL UG/L (ref 0–0.04)
TROPONIN I SERPL-MCNC: NORMAL UG/L (ref 0–0.04)
URN SPEC COLLECT METH UR: ABNORMAL
UROBILINOGEN UR STRIP-MCNC: NORMAL MG/DL (ref 0–2)
WBC # BLD AUTO: 10 10E9/L (ref 4–11)
WBC # BLD AUTO: 11.1 10E9/L (ref 4–11)
WBC # BLD AUTO: 11.6 10E9/L (ref 4–11)
WBC # BLD AUTO: 12.6 10E9/L (ref 4–11)
WBC # BLD AUTO: 15.6 10E9/L (ref 4–11)
WBC # BLD AUTO: 17.6 10E9/L (ref 4–11)
WBC # BLD AUTO: 17.6 10E9/L (ref 4–11)
WBC # BLD AUTO: 20.8 10E9/L (ref 4–11)
WBC # BLD AUTO: 26.1 10E9/L (ref 4–11)
WBC # BLD AUTO: 8 10E9/L (ref 4–11)
WBC # BLD AUTO: 9.2 10E9/L (ref 4–11)
WBC # BLD AUTO: 9.3 10E9/L (ref 4–11)
WBC # BLD AUTO: NORMAL 10E9/L (ref 4–11)
WBC #/AREA URNS AUTO: 1 /HPF (ref 0–2)

## 2017-01-01 PROCEDURE — 99232 SBSQ HOSP IP/OBS MODERATE 35: CPT | Performed by: INTERNAL MEDICINE

## 2017-01-01 PROCEDURE — 40000885 ZZH STATISTIC STEP DOWN HRS EVENING

## 2017-01-01 PROCEDURE — 27210886 ZZH ACCESSORY CR5

## 2017-01-01 PROCEDURE — 84145 PROCALCITONIN (PCT): CPT | Performed by: INTERNAL MEDICINE

## 2017-01-01 PROCEDURE — 25000132 ZZH RX MED GY IP 250 OP 250 PS 637: Mod: GY | Performed by: INTERNAL MEDICINE

## 2017-01-01 PROCEDURE — 25000132 ZZH RX MED GY IP 250 OP 250 PS 637: Mod: GY | Performed by: HOSPITALIST

## 2017-01-01 PROCEDURE — 80048 BASIC METABOLIC PNL TOTAL CA: CPT | Performed by: INTERNAL MEDICINE

## 2017-01-01 PROCEDURE — 83605 ASSAY OF LACTIC ACID: CPT | Performed by: INTERNAL MEDICINE

## 2017-01-01 PROCEDURE — 99233 SBSQ HOSP IP/OBS HIGH 50: CPT | Performed by: NURSE PRACTITIONER

## 2017-01-01 PROCEDURE — A9270 NON-COVERED ITEM OR SERVICE: HCPCS | Mod: GY | Performed by: INTERNAL MEDICINE

## 2017-01-01 PROCEDURE — 83880 ASSAY OF NATRIURETIC PEPTIDE: CPT | Performed by: EMERGENCY MEDICINE

## 2017-01-01 PROCEDURE — 80053 COMPREHEN METABOLIC PANEL: CPT | Performed by: EMERGENCY MEDICINE

## 2017-01-01 PROCEDURE — 93000 ELECTROCARDIOGRAM COMPLETE: CPT | Performed by: PHYSICIAN ASSISTANT

## 2017-01-01 PROCEDURE — 99222 1ST HOSP IP/OBS MODERATE 55: CPT | Performed by: INTERNAL MEDICINE

## 2017-01-01 PROCEDURE — 93294 REM INTERROG EVL PM/LDLS PM: CPT | Performed by: INTERNAL MEDICINE

## 2017-01-01 PROCEDURE — 93650 ICAR CATH ABLTJ AV NODE FUNC: CPT

## 2017-01-01 PROCEDURE — 99223 1ST HOSP IP/OBS HIGH 75: CPT | Mod: AI | Performed by: INTERNAL MEDICINE

## 2017-01-01 PROCEDURE — 36415 COLL VENOUS BLD VENIPUNCTURE: CPT | Performed by: INTERNAL MEDICINE

## 2017-01-01 PROCEDURE — 25000128 H RX IP 250 OP 636: Performed by: INTERNAL MEDICINE

## 2017-01-01 PROCEDURE — A9270 NON-COVERED ITEM OR SERVICE: HCPCS | Mod: GY | Performed by: HOSPITALIST

## 2017-01-01 PROCEDURE — 99214 OFFICE O/P EST MOD 30 MIN: CPT | Mod: 25 | Performed by: PHYSICIAN ASSISTANT

## 2017-01-01 PROCEDURE — 93306 TTE W/DOPPLER COMPLETE: CPT

## 2017-01-01 PROCEDURE — 99233 SBSQ HOSP IP/OBS HIGH 50: CPT | Performed by: INTERNAL MEDICINE

## 2017-01-01 PROCEDURE — 99207 ZZC CDG-CORRECTLY CODED, REVIEWED AND AGREE: CPT | Performed by: NURSE PRACTITIONER

## 2017-01-01 PROCEDURE — 99223 1ST HOSP IP/OBS HIGH 75: CPT | Performed by: NURSE PRACTITIONER

## 2017-01-01 PROCEDURE — 71020 XR CHEST 2 VW: CPT

## 2017-01-01 PROCEDURE — 93010 ELECTROCARDIOGRAM REPORT: CPT | Performed by: INTERNAL MEDICINE

## 2017-01-01 PROCEDURE — 83036 HEMOGLOBIN GLYCOSYLATED A1C: CPT | Performed by: INTERNAL MEDICINE

## 2017-01-01 PROCEDURE — 99152 MOD SED SAME PHYS/QHP 5/>YRS: CPT | Performed by: INTERNAL MEDICINE

## 2017-01-01 PROCEDURE — 40000193 ZZH STATISTIC PT WARD VISIT: Performed by: PHYSICAL THERAPIST

## 2017-01-01 PROCEDURE — 27210795 ZZH PAD DEFIB QUICK CR4

## 2017-01-01 PROCEDURE — 93308 TTE F-UP OR LMTD: CPT

## 2017-01-01 PROCEDURE — 25000125 ZZHC RX 250: Performed by: INTERNAL MEDICINE

## 2017-01-01 PROCEDURE — C1733 CATH, EP, OTHR THAN COOL-TIP: HCPCS

## 2017-01-01 PROCEDURE — 85025 COMPLETE CBC W/AUTO DIFF WBC: CPT | Performed by: EMERGENCY MEDICINE

## 2017-01-01 PROCEDURE — 40000852 ZZH STATISTIC HEART CATH LAB OR EP LAB

## 2017-01-01 PROCEDURE — 40000886 ZZH STATISTIC STEP DOWN HRS DAY

## 2017-01-01 PROCEDURE — 84484 ASSAY OF TROPONIN QUANT: CPT | Performed by: INTERNAL MEDICINE

## 2017-01-01 PROCEDURE — G0378 HOSPITAL OBSERVATION PER HR: HCPCS

## 2017-01-01 PROCEDURE — 80048 BASIC METABOLIC PNL TOTAL CA: CPT | Performed by: PHYSICIAN ASSISTANT

## 2017-01-01 PROCEDURE — 40000275 ZZH STATISTIC RCP TIME EA 10 MIN

## 2017-01-01 PROCEDURE — 80053 COMPREHEN METABOLIC PANEL: CPT | Performed by: INTERNAL MEDICINE

## 2017-01-01 PROCEDURE — 97140 MANUAL THERAPY 1/> REGIONS: CPT | Mod: GP | Performed by: PHYSICAL THERAPIST

## 2017-01-01 PROCEDURE — 40000884 ZZH STATISTIC STEP DOWN HRS NIGHT

## 2017-01-01 PROCEDURE — 85027 COMPLETE CBC AUTOMATED: CPT | Performed by: INTERNAL MEDICINE

## 2017-01-01 PROCEDURE — 84132 ASSAY OF SERUM POTASSIUM: CPT | Performed by: INTERNAL MEDICINE

## 2017-01-01 PROCEDURE — 12000000 ZZH R&B MED SURG/OB

## 2017-01-01 PROCEDURE — 99214 OFFICE O/P EST MOD 30 MIN: CPT | Performed by: INTERNAL MEDICINE

## 2017-01-01 PROCEDURE — 99215 OFFICE O/P EST HI 40 MIN: CPT | Performed by: INTERNAL MEDICINE

## 2017-01-01 PROCEDURE — 99207 ZZC NO CHARGE VISIT/PATIENT NOT SEEN: CPT | Performed by: INTERNAL MEDICINE

## 2017-01-01 PROCEDURE — 97161 PT EVAL LOW COMPLEX 20 MIN: CPT | Mod: GP | Performed by: PHYSICAL THERAPIST

## 2017-01-01 PROCEDURE — 99217 ZZC OBSERVATION CARE DISCHARGE: CPT | Performed by: INTERNAL MEDICINE

## 2017-01-01 PROCEDURE — 82803 BLOOD GASES ANY COMBINATION: CPT

## 2017-01-01 PROCEDURE — 96368 THER/DIAG CONCURRENT INF: CPT

## 2017-01-01 PROCEDURE — 83735 ASSAY OF MAGNESIUM: CPT | Performed by: INTERNAL MEDICINE

## 2017-01-01 PROCEDURE — 84484 ASSAY OF TROPONIN QUANT: CPT | Performed by: EMERGENCY MEDICINE

## 2017-01-01 PROCEDURE — 97530 THERAPEUTIC ACTIVITIES: CPT | Mod: GP | Performed by: PHYSICAL THERAPIST

## 2017-01-01 PROCEDURE — 85610 PROTHROMBIN TIME: CPT | Performed by: INTERNAL MEDICINE

## 2017-01-01 PROCEDURE — 71010 XR CHEST PORT 1 VW: CPT

## 2017-01-01 PROCEDURE — 36415 COLL VENOUS BLD VENIPUNCTURE: CPT | Performed by: PHYSICIAN ASSISTANT

## 2017-01-01 PROCEDURE — 96375 TX/PRO/DX INJ NEW DRUG ADDON: CPT

## 2017-01-01 PROCEDURE — 99239 HOSP IP/OBS DSCHRG MGMT >30: CPT | Performed by: INTERNAL MEDICINE

## 2017-01-01 PROCEDURE — 25000132 ZZH RX MED GY IP 250 OP 250 PS 637: Mod: GY | Performed by: NURSE PRACTITIONER

## 2017-01-01 PROCEDURE — 94660 CPAP INITIATION&MGMT: CPT

## 2017-01-01 PROCEDURE — 25000125 ZZHC RX 250: Performed by: EMERGENCY MEDICINE

## 2017-01-01 PROCEDURE — 40000983 ZZH STATISTIC HFNC ADULT NON-CPAP

## 2017-01-01 PROCEDURE — 99220 ZZC INITIAL OBSERVATION CARE,LEVL III: CPT | Performed by: INTERNAL MEDICINE

## 2017-01-01 PROCEDURE — 21000000 ZZH R&B IMCU HEART CARE

## 2017-01-01 PROCEDURE — 40000936 ZZH STATISTIC OUTPATIENT (NON-OBS) NIGHT

## 2017-01-01 PROCEDURE — 02583ZZ DESTRUCTION OF CONDUCTION MECHANISM, PERCUTANEOUS APPROACH: ICD-10-PCS | Performed by: INTERNAL MEDICINE

## 2017-01-01 PROCEDURE — 93005 ELECTROCARDIOGRAM TRACING: CPT

## 2017-01-01 PROCEDURE — 99285 EMERGENCY DEPT VISIT HI MDM: CPT | Mod: 25

## 2017-01-01 PROCEDURE — 36415 COLL VENOUS BLD VENIPUNCTURE: CPT

## 2017-01-01 PROCEDURE — 81001 URINALYSIS AUTO W/SCOPE: CPT | Performed by: FAMILY MEDICINE

## 2017-01-01 PROCEDURE — 99214 OFFICE O/P EST MOD 30 MIN: CPT | Performed by: PHYSICIAN ASSISTANT

## 2017-01-01 PROCEDURE — A9270 NON-COVERED ITEM OR SERVICE: HCPCS | Mod: GY | Performed by: NURSE PRACTITIONER

## 2017-01-01 PROCEDURE — 93308 TTE F-UP OR LMTD: CPT | Mod: 26 | Performed by: INTERNAL MEDICINE

## 2017-01-01 PROCEDURE — 87040 BLOOD CULTURE FOR BACTERIA: CPT | Performed by: EMERGENCY MEDICINE

## 2017-01-01 PROCEDURE — 99153 MOD SED SAME PHYS/QHP EA: CPT | Performed by: INTERNAL MEDICINE

## 2017-01-01 PROCEDURE — 25500064 ZZH RX 255 OP 636: Performed by: EMERGENCY MEDICINE

## 2017-01-01 PROCEDURE — 96374 THER/PROPH/DIAG INJ IV PUSH: CPT

## 2017-01-01 PROCEDURE — 83605 ASSAY OF LACTIC ACID: CPT

## 2017-01-01 PROCEDURE — 84484 ASSAY OF TROPONIN QUANT: CPT | Mod: 91 | Performed by: INTERNAL MEDICINE

## 2017-01-01 PROCEDURE — 99212 OFFICE O/P EST SF 10 MIN: CPT

## 2017-01-01 PROCEDURE — 97530 THERAPEUTIC ACTIVITIES: CPT | Mod: GP

## 2017-01-01 PROCEDURE — 99223 1ST HOSP IP/OBS HIGH 75: CPT | Performed by: INTERNAL MEDICINE

## 2017-01-01 PROCEDURE — 80048 BASIC METABOLIC PNL TOTAL CA: CPT | Performed by: EMERGENCY MEDICINE

## 2017-01-01 PROCEDURE — 25000128 H RX IP 250 OP 636

## 2017-01-01 PROCEDURE — 99207 ZZC NO CHARGE LOS: CPT

## 2017-01-01 PROCEDURE — 93280 PM DEVICE PROGR EVAL DUAL: CPT | Performed by: INTERNAL MEDICINE

## 2017-01-01 PROCEDURE — 00000146 ZZHCL STATISTIC GLUCOSE BY METER IP

## 2017-01-01 PROCEDURE — 40000193 ZZH STATISTIC PT WARD VISIT

## 2017-01-01 PROCEDURE — 82805 BLOOD GASES W/O2 SATURATION: CPT | Performed by: INTERNAL MEDICINE

## 2017-01-01 PROCEDURE — 25000128 H RX IP 250 OP 636: Performed by: EMERGENCY MEDICINE

## 2017-01-01 PROCEDURE — 97161 PT EVAL LOW COMPLEX 20 MIN: CPT | Mod: GP

## 2017-01-01 PROCEDURE — 27210995 ZZH RX 272: Performed by: INTERNAL MEDICINE

## 2017-01-01 PROCEDURE — 93325 DOPPLER ECHO COLOR FLOW MAPG: CPT | Mod: 26 | Performed by: INTERNAL MEDICINE

## 2017-01-01 PROCEDURE — 99223 1ST HOSP IP/OBS HIGH 75: CPT | Mod: AI | Performed by: HOSPITALIST

## 2017-01-01 PROCEDURE — 97140 MANUAL THERAPY 1/> REGIONS: CPT | Mod: GP

## 2017-01-01 PROCEDURE — 93306 TTE W/DOPPLER COMPLETE: CPT | Mod: 26 | Performed by: INTERNAL MEDICINE

## 2017-01-01 PROCEDURE — 40000141 ZZH STATISTIC PERIPHERAL IV START W/O US GUIDANCE

## 2017-01-01 PROCEDURE — 84145 PROCALCITONIN (PCT): CPT | Performed by: EMERGENCY MEDICINE

## 2017-01-01 PROCEDURE — 40000935 ZZH STATISTIC OUTPATIENT (NON-OBS) EVE

## 2017-01-01 PROCEDURE — 93650 ICAR CATH ABLTJ AV NODE FUNC: CPT | Performed by: INTERNAL MEDICINE

## 2017-01-01 PROCEDURE — 85730 THROMBOPLASTIN TIME PARTIAL: CPT | Performed by: EMERGENCY MEDICINE

## 2017-01-01 PROCEDURE — 93296 REM INTERROG EVL PM/IDS: CPT | Performed by: INTERNAL MEDICINE

## 2017-01-01 PROCEDURE — 40000934 ZZH STATISTIC OUTPATIENT (NON-OBS) DAY

## 2017-01-01 PROCEDURE — 93321 DOPPLER ECHO F-UP/LMTD STD: CPT | Mod: 26 | Performed by: INTERNAL MEDICINE

## 2017-01-01 PROCEDURE — 99153 MOD SED SAME PHYS/QHP EA: CPT

## 2017-01-01 PROCEDURE — 36415 COLL VENOUS BLD VENIPUNCTURE: CPT | Performed by: EMERGENCY MEDICINE

## 2017-01-01 PROCEDURE — 71260 CT THORAX DX C+: CPT

## 2017-01-01 PROCEDURE — 40000065 ZZH STATISTIC EKG NON-CHARGEABLE

## 2017-01-01 PROCEDURE — 99214 OFFICE O/P EST MOD 30 MIN: CPT | Mod: 25 | Performed by: INTERNAL MEDICINE

## 2017-01-01 PROCEDURE — 27210782 ZZH KIT EP TOTES DISP CR7

## 2017-01-01 PROCEDURE — 96367 TX/PROPH/DG ADDL SEQ IV INF: CPT

## 2017-01-01 PROCEDURE — 96365 THER/PROPH/DIAG IV INF INIT: CPT

## 2017-01-01 PROCEDURE — 97116 GAIT TRAINING THERAPY: CPT | Mod: GP

## 2017-01-01 PROCEDURE — 97110 THERAPEUTIC EXERCISES: CPT | Mod: GP | Performed by: PHYSICAL THERAPIST

## 2017-01-01 PROCEDURE — 25000125 ZZHC RX 250: Performed by: HOSPITALIST

## 2017-01-01 PROCEDURE — 99152 MOD SED SAME PHYS/QHP 5/>YRS: CPT

## 2017-01-01 PROCEDURE — 85610 PROTHROMBIN TIME: CPT | Performed by: EMERGENCY MEDICINE

## 2017-01-01 PROCEDURE — 99213 OFFICE O/P EST LOW 20 MIN: CPT | Performed by: INTERNAL MEDICINE

## 2017-01-01 PROCEDURE — 97116 GAIT TRAINING THERAPY: CPT | Mod: GP | Performed by: PHYSICAL THERAPIST

## 2017-01-01 PROCEDURE — 36600 WITHDRAWAL OF ARTERIAL BLOOD: CPT

## 2017-01-01 PROCEDURE — C1893 INTRO/SHEATH, FIXED,NON-PEEL: HCPCS

## 2017-01-01 RX ORDER — FUROSEMIDE 40 MG
40 TABLET ORAL DAILY
Qty: 100 TABLET | Refills: 3 | Status: SHIPPED | OUTPATIENT
Start: 2017-01-01 | End: 2017-01-01

## 2017-01-01 RX ORDER — LORAZEPAM 2 MG/ML
.5-2 INJECTION INTRAMUSCULAR EVERY 10 MIN PRN
Status: DISCONTINUED | OUTPATIENT
Start: 2017-01-01 | End: 2017-01-01 | Stop reason: HOSPADM

## 2017-01-01 RX ORDER — ACETAMINOPHEN 325 MG/1
1300 TABLET ORAL 2 TIMES DAILY
Status: DISCONTINUED | OUTPATIENT
Start: 2017-01-01 | End: 2017-01-01

## 2017-01-01 RX ORDER — HEPARIN SODIUM 1000 [USP'U]/ML
1000-10000 INJECTION, SOLUTION INTRAVENOUS; SUBCUTANEOUS EVERY 5 MIN PRN
Status: DISCONTINUED | OUTPATIENT
Start: 2017-01-01 | End: 2017-01-01 | Stop reason: HOSPADM

## 2017-01-01 RX ORDER — AMOXICILLIN 250 MG
1-2 CAPSULE ORAL 2 TIMES DAILY PRN
Status: DISCONTINUED | OUTPATIENT
Start: 2017-01-01 | End: 2017-01-01 | Stop reason: HOSPADM

## 2017-01-01 RX ORDER — IOPAMIDOL 755 MG/ML
53 INJECTION, SOLUTION INTRAVASCULAR ONCE
Status: COMPLETED | OUTPATIENT
Start: 2017-01-01 | End: 2017-01-01

## 2017-01-01 RX ORDER — AMIODARONE HYDROCHLORIDE 200 MG/1
200 TABLET ORAL DAILY
Status: DISCONTINUED | OUTPATIENT
Start: 2017-01-01 | End: 2017-01-01

## 2017-01-01 RX ORDER — ONDANSETRON 4 MG/1
4 TABLET, ORALLY DISINTEGRATING ORAL EVERY 6 HOURS PRN
Status: DISCONTINUED | OUTPATIENT
Start: 2017-01-01 | End: 2017-01-01 | Stop reason: HOSPADM

## 2017-01-01 RX ORDER — LIDOCAINE 40 MG/G
CREAM TOPICAL
Status: DISCONTINUED | OUTPATIENT
Start: 2017-01-01 | End: 2017-01-01 | Stop reason: HOSPADM

## 2017-01-01 RX ORDER — LIDOCAINE 40 MG/G
CREAM TOPICAL
Status: CANCELLED | OUTPATIENT
Start: 2017-01-01

## 2017-01-01 RX ORDER — HALOPERIDOL 2 MG/ML
1-2 SOLUTION ORAL EVERY 6 HOURS PRN
Status: DISCONTINUED | OUTPATIENT
Start: 2017-01-01 | End: 2017-01-01

## 2017-01-01 RX ORDER — FUROSEMIDE 40 MG
40 TABLET ORAL DAILY
Qty: 30 TABLET | Refills: 3 | Status: SHIPPED | OUTPATIENT
Start: 2017-01-01 | End: 2017-01-01

## 2017-01-01 RX ORDER — METOPROLOL SUCCINATE 50 MG/1
50 TABLET, EXTENDED RELEASE ORAL 2 TIMES DAILY
Qty: 180 TABLET | Refills: 5 | Status: ON HOLD | OUTPATIENT
Start: 2017-01-01 | End: 2017-01-01

## 2017-01-01 RX ORDER — LATANOPROST 50 UG/ML
1 SOLUTION/ DROPS OPHTHALMIC AT BEDTIME
Status: DISCONTINUED | OUTPATIENT
Start: 2017-01-01 | End: 2017-01-01 | Stop reason: HOSPADM

## 2017-01-01 RX ORDER — PROTAMINE SULFATE 10 MG/ML
1-5 INJECTION, SOLUTION INTRAVENOUS
Status: DISCONTINUED | OUTPATIENT
Start: 2017-01-01 | End: 2017-01-01 | Stop reason: HOSPADM

## 2017-01-01 RX ORDER — SENNOSIDES 8.6 MG
1300 CAPSULE ORAL 2 TIMES DAILY
COMMUNITY

## 2017-01-01 RX ORDER — MORPHINE SULFATE 20 MG/ML
2.5-5 SOLUTION ORAL
Status: DISCONTINUED | OUTPATIENT
Start: 2017-01-01 | End: 2017-01-01

## 2017-01-01 RX ORDER — NALOXONE HYDROCHLORIDE 0.4 MG/ML
.1-.4 INJECTION, SOLUTION INTRAMUSCULAR; INTRAVENOUS; SUBCUTANEOUS
Status: DISCONTINUED | OUTPATIENT
Start: 2017-01-01 | End: 2017-01-01 | Stop reason: HOSPADM

## 2017-01-01 RX ORDER — BRIMONIDINE TARTRATE 2 MG/ML
1 SOLUTION/ DROPS OPHTHALMIC 2 TIMES DAILY
Status: DISCONTINUED | OUTPATIENT
Start: 2017-01-01 | End: 2017-01-01 | Stop reason: HOSPADM

## 2017-01-01 RX ORDER — BUPIVACAINE HYDROCHLORIDE 2.5 MG/ML
10-30 INJECTION, SOLUTION EPIDURAL; INFILTRATION; INTRACAUDAL
Status: DISCONTINUED | OUTPATIENT
Start: 2017-01-01 | End: 2017-01-01 | Stop reason: HOSPADM

## 2017-01-01 RX ORDER — ONDANSETRON 2 MG/ML
4 INJECTION INTRAMUSCULAR; INTRAVENOUS EVERY 6 HOURS PRN
Status: DISCONTINUED | OUTPATIENT
Start: 2017-01-01 | End: 2017-01-01 | Stop reason: HOSPADM

## 2017-01-01 RX ORDER — LISINOPRIL 20 MG/1
20 TABLET ORAL DAILY
Status: DISCONTINUED | OUTPATIENT
Start: 2017-01-01 | End: 2017-01-01

## 2017-01-01 RX ORDER — METOPROLOL TARTRATE 25 MG/1
25 TABLET, FILM COATED ORAL 2 TIMES DAILY
Status: DISCONTINUED | OUTPATIENT
Start: 2017-01-01 | End: 2017-01-01

## 2017-01-01 RX ORDER — POTASSIUM CHLORIDE 1.5 G/1.58G
20-40 POWDER, FOR SOLUTION ORAL
Status: DISCONTINUED | OUTPATIENT
Start: 2017-01-01 | End: 2017-01-01 | Stop reason: HOSPADM

## 2017-01-01 RX ORDER — HYDRALAZINE HYDROCHLORIDE 20 MG/ML
10 INJECTION INTRAMUSCULAR; INTRAVENOUS ONCE
Status: COMPLETED | OUTPATIENT
Start: 2017-01-01 | End: 2017-01-01

## 2017-01-01 RX ORDER — FUROSEMIDE 10 MG/ML
40 INJECTION INTRAMUSCULAR; INTRAVENOUS ONCE
Status: COMPLETED | OUTPATIENT
Start: 2017-01-01 | End: 2017-01-01

## 2017-01-01 RX ORDER — LISINOPRIL 20 MG/1
20 TABLET ORAL DAILY
Status: DISCONTINUED | OUTPATIENT
Start: 2017-01-01 | End: 2017-01-01 | Stop reason: HOSPADM

## 2017-01-01 RX ORDER — LISINOPRIL 20 MG/1
20 TABLET ORAL DAILY
Qty: 30 TABLET | Refills: 0 | Status: SHIPPED | OUTPATIENT
Start: 2017-01-01

## 2017-01-01 RX ORDER — AMLODIPINE BESYLATE 10 MG/1
10 TABLET ORAL DAILY
Qty: 30 TABLET | Refills: 0 | COMMUNITY
Start: 2017-01-01

## 2017-01-01 RX ORDER — CEFAZOLIN SODIUM 2 G/100ML
2 INJECTION, SOLUTION INTRAVENOUS
Status: DISCONTINUED | OUTPATIENT
Start: 2017-01-01 | End: 2017-01-01

## 2017-01-01 RX ORDER — SPIRONOLACTONE 25 MG/1
25 TABLET ORAL DAILY
Status: DISCONTINUED | OUTPATIENT
Start: 2017-01-01 | End: 2017-01-01

## 2017-01-01 RX ORDER — PRAVASTATIN SODIUM 20 MG
20 TABLET ORAL DAILY
Status: DISCONTINUED | OUTPATIENT
Start: 2017-01-01 | End: 2017-01-01 | Stop reason: HOSPADM

## 2017-01-01 RX ORDER — ATROPINE SULFATE 0.1 MG/ML
.5-1 INJECTION INTRAVENOUS
Status: DISCONTINUED | OUTPATIENT
Start: 2017-01-01 | End: 2017-01-01 | Stop reason: HOSPADM

## 2017-01-01 RX ORDER — MORPHINE SULFATE 20 MG/ML
10-20 SOLUTION ORAL
Status: DISCONTINUED | OUTPATIENT
Start: 2017-01-01 | End: 2017-01-01 | Stop reason: HOSPADM

## 2017-01-01 RX ORDER — AMLODIPINE BESYLATE 10 MG/1
10 TABLET ORAL DAILY
Qty: 30 TABLET | Refills: 0 | Status: SHIPPED | OUTPATIENT
Start: 2017-01-01 | End: 2017-01-01

## 2017-01-01 RX ORDER — MORPHINE SULFATE 2 MG/ML
1-2 INJECTION, SOLUTION INTRAMUSCULAR; INTRAVENOUS
Status: DISCONTINUED | OUTPATIENT
Start: 2017-01-01 | End: 2017-01-01

## 2017-01-01 RX ORDER — DILTIAZEM HYDROCHLORIDE 5 MG/ML
10 INJECTION INTRAVENOUS ONCE
Status: COMPLETED | OUTPATIENT
Start: 2017-01-01 | End: 2017-01-01

## 2017-01-01 RX ORDER — FUROSEMIDE 10 MG/ML
INJECTION INTRAMUSCULAR; INTRAVENOUS
Status: COMPLETED
Start: 2017-01-01 | End: 2017-01-01

## 2017-01-01 RX ORDER — LIDOCAINE 40 MG/G
CREAM TOPICAL
Status: DISCONTINUED | OUTPATIENT
Start: 2017-01-01 | End: 2017-01-01

## 2017-01-01 RX ORDER — ONDANSETRON 4 MG/1
4 TABLET, ORALLY DISINTEGRATING ORAL EVERY 6 HOURS PRN
Status: DISCONTINUED | OUTPATIENT
Start: 2017-01-01 | End: 2017-01-01

## 2017-01-01 RX ORDER — LISINOPRIL 10 MG/1
10 TABLET ORAL DAILY
Status: DISCONTINUED | OUTPATIENT
Start: 2017-01-01 | End: 2017-01-01 | Stop reason: HOSPADM

## 2017-01-01 RX ORDER — MULTIPLE VITAMINS W/ MINERALS TAB 9MG-400MCG
1 TAB ORAL DAILY
Status: DISCONTINUED | OUTPATIENT
Start: 2017-01-01 | End: 2017-01-01 | Stop reason: HOSPADM

## 2017-01-01 RX ORDER — MAGNESIUM SULFATE HEPTAHYDRATE 40 MG/ML
4 INJECTION, SOLUTION INTRAVENOUS EVERY 4 HOURS PRN
Status: DISCONTINUED | OUTPATIENT
Start: 2017-01-01 | End: 2017-01-01 | Stop reason: HOSPADM

## 2017-01-01 RX ORDER — FUROSEMIDE 40 MG
40 TABLET ORAL DAILY
Status: DISCONTINUED | OUTPATIENT
Start: 2017-01-01 | End: 2017-01-01 | Stop reason: HOSPADM

## 2017-01-01 RX ORDER — METOPROLOL SUCCINATE 25 MG/1
75 TABLET, EXTENDED RELEASE ORAL 2 TIMES DAILY
Qty: 180 TABLET | Refills: 3 | Status: SHIPPED | OUTPATIENT
Start: 2017-01-01 | End: 2017-01-01

## 2017-01-01 RX ORDER — MORPHINE SULFATE 10 MG/5ML
2.5-5 SOLUTION ORAL
Status: DISCONTINUED | OUTPATIENT
Start: 2017-01-01 | End: 2017-01-01

## 2017-01-01 RX ORDER — FUROSEMIDE 10 MG/ML
20 INJECTION INTRAMUSCULAR; INTRAVENOUS
Status: DISCONTINUED | OUTPATIENT
Start: 2017-01-01 | End: 2017-01-01

## 2017-01-01 RX ORDER — ACETAMINOPHEN 325 MG/1
650 TABLET ORAL EVERY 4 HOURS PRN
Status: DISCONTINUED | OUTPATIENT
Start: 2017-01-01 | End: 2017-01-01

## 2017-01-01 RX ORDER — TORSEMIDE 20 MG/1
20 TABLET ORAL DAILY
Qty: 30 TABLET | Refills: 3 | Status: SHIPPED | OUTPATIENT
Start: 2017-01-01 | End: 2017-01-01

## 2017-01-01 RX ORDER — LISINOPRIL 10 MG/1
10 TABLET ORAL DAILY
Qty: 30 TABLET | Refills: 3 | Status: ON HOLD | COMMUNITY
Start: 2017-01-01 | End: 2017-01-01

## 2017-01-01 RX ORDER — METOPROLOL SUCCINATE 25 MG/1
75 TABLET, EXTENDED RELEASE ORAL DAILY
Qty: 180 TABLET | Refills: 3 | Status: ON HOLD | OUTPATIENT
Start: 2017-01-01 | End: 2017-01-01

## 2017-01-01 RX ORDER — POTASSIUM CHLORIDE 1500 MG/1
20 TABLET, EXTENDED RELEASE ORAL ONCE
Status: COMPLETED | OUTPATIENT
Start: 2017-01-01 | End: 2017-01-01

## 2017-01-01 RX ORDER — NITROGLYCERIN 0.4 MG/1
0.4 TABLET SUBLINGUAL EVERY 5 MIN PRN
Status: DISCONTINUED | OUTPATIENT
Start: 2017-01-01 | End: 2017-01-01

## 2017-01-01 RX ORDER — MAGNESIUM SULFATE HEPTAHYDRATE 40 MG/ML
4 INJECTION, SOLUTION INTRAVENOUS EVERY 4 HOURS PRN
Status: DISCONTINUED | OUTPATIENT
Start: 2017-01-01 | End: 2017-01-01

## 2017-01-01 RX ORDER — SENNOSIDES 8.6 MG
650 CAPSULE ORAL DAILY
Status: DISCONTINUED | OUTPATIENT
Start: 2017-01-01 | End: 2017-01-01

## 2017-01-01 RX ORDER — LIDOCAINE HYDROCHLORIDE 10 MG/ML
10-30 INJECTION, SOLUTION EPIDURAL; INFILTRATION; INTRACAUDAL; PERINEURAL
Status: DISCONTINUED | OUTPATIENT
Start: 2017-01-01 | End: 2017-01-01 | Stop reason: HOSPADM

## 2017-01-01 RX ORDER — NITROGLYCERIN 20 MG/100ML
0.07-2 INJECTION INTRAVENOUS CONTINUOUS
Status: DISCONTINUED | OUTPATIENT
Start: 2017-01-01 | End: 2017-01-01

## 2017-01-01 RX ORDER — TORSEMIDE 20 MG/1
20 TABLET ORAL DAILY
Qty: 90 TABLET | Refills: 3 | Status: SHIPPED | OUTPATIENT
Start: 2017-01-01 | End: 2017-01-01

## 2017-01-01 RX ORDER — PANTOPRAZOLE SODIUM 40 MG/1
40 TABLET, DELAYED RELEASE ORAL EVERY MORNING
Status: DISCONTINUED | OUTPATIENT
Start: 2017-01-01 | End: 2017-01-01

## 2017-01-01 RX ORDER — METOPROLOL SUCCINATE 50 MG/1
50 TABLET, EXTENDED RELEASE ORAL 2 TIMES DAILY
Status: DISCONTINUED | OUTPATIENT
Start: 2017-01-01 | End: 2017-01-01

## 2017-01-01 RX ORDER — LORAZEPAM 2 MG/ML
.5-1 INJECTION INTRAMUSCULAR
Status: DISCONTINUED | OUTPATIENT
Start: 2017-01-01 | End: 2017-01-01

## 2017-01-01 RX ORDER — MOXIFLOXACIN 5 MG/ML
1 SOLUTION/ DROPS OPHTHALMIC DAILY PRN
Status: DISCONTINUED | OUTPATIENT
Start: 2017-01-01 | End: 2017-01-01

## 2017-01-01 RX ORDER — ACETAMINOPHEN 325 MG/1
650 TABLET ORAL EVERY 6 HOURS PRN
Status: DISCONTINUED | OUTPATIENT
Start: 2017-01-01 | End: 2017-01-01 | Stop reason: HOSPADM

## 2017-01-01 RX ORDER — POTASSIUM CHLORIDE 29.8 MG/ML
20 INJECTION INTRAVENOUS
Status: DISCONTINUED | OUTPATIENT
Start: 2017-01-01 | End: 2017-01-01 | Stop reason: HOSPADM

## 2017-01-01 RX ORDER — POTASSIUM CHLORIDE 7.45 MG/ML
10 INJECTION INTRAVENOUS
Status: DISCONTINUED | OUTPATIENT
Start: 2017-01-01 | End: 2017-01-01

## 2017-01-01 RX ORDER — MORPHINE SULFATE 10 MG/5ML
5-10 SOLUTION ORAL
Status: DISCONTINUED | OUTPATIENT
Start: 2017-01-01 | End: 2017-01-01

## 2017-01-01 RX ORDER — TORSEMIDE 10 MG/1
10 TABLET ORAL DAILY
Qty: 30 TABLET | Refills: 3 | Status: SHIPPED | OUTPATIENT
Start: 2017-01-01 | End: 2017-01-01

## 2017-01-01 RX ORDER — TIMOLOL MALEATE 5 MG/ML
1 SOLUTION/ DROPS OPHTHALMIC 2 TIMES DAILY
Status: DISCONTINUED | OUTPATIENT
Start: 2017-01-01 | End: 2017-01-01 | Stop reason: HOSPADM

## 2017-01-01 RX ORDER — ACETAMINOPHEN 325 MG/1
650 TABLET ORAL DAILY
Status: DISCONTINUED | OUTPATIENT
Start: 2017-01-01 | End: 2017-01-01 | Stop reason: HOSPADM

## 2017-01-01 RX ORDER — PIPERACILLIN SODIUM, TAZOBACTAM SODIUM 3; .375 G/15ML; G/15ML
3.38 INJECTION, POWDER, LYOPHILIZED, FOR SOLUTION INTRAVENOUS ONCE
Status: COMPLETED | OUTPATIENT
Start: 2017-01-01 | End: 2017-01-01

## 2017-01-01 RX ORDER — BISACODYL 10 MG
10 SUPPOSITORY, RECTAL RECTAL DAILY PRN
Status: DISCONTINUED | OUTPATIENT
Start: 2017-01-01 | End: 2017-01-01 | Stop reason: HOSPADM

## 2017-01-01 RX ORDER — MORPHINE SULFATE 0.5 MG/ML
1-2 INJECTION, SOLUTION EPIDURAL; INTRATHECAL; INTRAVENOUS
Status: DISCONTINUED | OUTPATIENT
Start: 2017-01-01 | End: 2017-01-01 | Stop reason: HOSPADM

## 2017-01-01 RX ORDER — NITROGLYCERIN 0.4 MG/1
0.4 TABLET SUBLINGUAL EVERY 5 MIN PRN
Status: DISCONTINUED | OUTPATIENT
Start: 2017-01-01 | End: 2017-01-01 | Stop reason: HOSPADM

## 2017-01-01 RX ORDER — TRAMADOL HYDROCHLORIDE 50 MG/1
50 TABLET ORAL AT BEDTIME
Status: DISCONTINUED | OUTPATIENT
Start: 2017-01-01 | End: 2017-01-01 | Stop reason: HOSPADM

## 2017-01-01 RX ORDER — POTASSIUM CHLORIDE 7.45 MG/ML
10 INJECTION INTRAVENOUS
Status: DISCONTINUED | OUTPATIENT
Start: 2017-01-01 | End: 2017-01-01 | Stop reason: HOSPADM

## 2017-01-01 RX ORDER — FUROSEMIDE 10 MG/ML
40 INJECTION INTRAMUSCULAR; INTRAVENOUS
Status: DISCONTINUED | OUTPATIENT
Start: 2017-01-01 | End: 2017-01-01

## 2017-01-01 RX ORDER — PROMETHAZINE HYDROCHLORIDE 25 MG/ML
6.25-25 INJECTION, SOLUTION INTRAMUSCULAR; INTRAVENOUS EVERY 4 HOURS PRN
Status: DISCONTINUED | OUTPATIENT
Start: 2017-01-01 | End: 2017-01-01 | Stop reason: HOSPADM

## 2017-01-01 RX ORDER — NITROGLYCERIN 20 MG/100ML
INJECTION INTRAVENOUS
Status: COMPLETED
Start: 2017-01-01 | End: 2017-01-01

## 2017-01-01 RX ORDER — CHLORPROMAZINE HYDROCHLORIDE 10 MG/1
25 TABLET, FILM COATED ORAL EVERY 6 HOURS PRN
Status: DISCONTINUED | OUTPATIENT
Start: 2017-01-01 | End: 2017-01-01 | Stop reason: HOSPADM

## 2017-01-01 RX ORDER — POTASSIUM CHLORIDE 1500 MG/1
20-40 TABLET, EXTENDED RELEASE ORAL
Status: DISCONTINUED | OUTPATIENT
Start: 2017-01-01 | End: 2017-01-01 | Stop reason: HOSPADM

## 2017-01-01 RX ORDER — MORPHINE SULFATE 20 MG/ML
5-10 SOLUTION ORAL
Status: DISCONTINUED | OUTPATIENT
Start: 2017-01-01 | End: 2017-01-01

## 2017-01-01 RX ORDER — MORPHINE SULFATE 2 MG/ML
1-2 INJECTION, SOLUTION INTRAMUSCULAR; INTRAVENOUS EVERY 5 MIN PRN
Status: DISCONTINUED | OUTPATIENT
Start: 2017-01-01 | End: 2017-01-01 | Stop reason: HOSPADM

## 2017-01-01 RX ORDER — AMLODIPINE BESYLATE 10 MG/1
10 TABLET ORAL DAILY
Status: DISCONTINUED | OUTPATIENT
Start: 2017-01-01 | End: 2017-01-01

## 2017-01-01 RX ORDER — AMLODIPINE BESYLATE 5 MG/1
5 TABLET ORAL DAILY
Status: DISCONTINUED | OUTPATIENT
Start: 2017-01-01 | End: 2017-01-01

## 2017-01-01 RX ORDER — ATROPINE SULFATE 10 MG/ML
1-2 SOLUTION/ DROPS OPHTHALMIC
Status: DISCONTINUED | OUTPATIENT
Start: 2017-01-01 | End: 2017-01-01 | Stop reason: HOSPADM

## 2017-01-01 RX ORDER — ONDANSETRON 2 MG/ML
4 INJECTION INTRAMUSCULAR; INTRAVENOUS EVERY 6 HOURS PRN
Status: DISCONTINUED | OUTPATIENT
Start: 2017-01-01 | End: 2017-01-01

## 2017-01-01 RX ORDER — NICOTINE POLACRILEX 4 MG
15-30 LOZENGE BUCCAL
Status: DISCONTINUED | OUTPATIENT
Start: 2017-01-01 | End: 2017-01-01

## 2017-01-01 RX ORDER — FLUMAZENIL 0.1 MG/ML
0.2 INJECTION, SOLUTION INTRAVENOUS
Status: DISCONTINUED | OUTPATIENT
Start: 2017-01-01 | End: 2017-01-01 | Stop reason: HOSPADM

## 2017-01-01 RX ORDER — ACETAMINOPHEN 325 MG/1
650 TABLET ORAL EVERY 4 HOURS PRN
Status: DISCONTINUED | OUTPATIENT
Start: 2017-01-01 | End: 2017-01-01 | Stop reason: HOSPADM

## 2017-01-01 RX ORDER — FUROSEMIDE 40 MG
20 TABLET ORAL DAILY
Qty: 30 TABLET | COMMUNITY
Start: 2017-01-01 | End: 2017-01-01

## 2017-01-01 RX ORDER — ACETAMINOPHEN 650 MG/1
650 SUPPOSITORY RECTAL EVERY 4 HOURS PRN
Status: DISCONTINUED | OUTPATIENT
Start: 2017-01-01 | End: 2017-01-01 | Stop reason: HOSPADM

## 2017-01-01 RX ORDER — ONDANSETRON 2 MG/ML
4 INJECTION INTRAMUSCULAR; INTRAVENOUS EVERY 4 HOURS PRN
Status: DISCONTINUED | OUTPATIENT
Start: 2017-01-01 | End: 2017-01-01 | Stop reason: HOSPADM

## 2017-01-01 RX ORDER — NALOXONE HYDROCHLORIDE 0.4 MG/ML
.1-.4 INJECTION, SOLUTION INTRAMUSCULAR; INTRAVENOUS; SUBCUTANEOUS
Status: DISCONTINUED | OUTPATIENT
Start: 2017-01-01 | End: 2017-01-01

## 2017-01-01 RX ORDER — ACETAMINOPHEN 325 MG/1
650 TABLET ORAL EVERY 6 HOURS PRN
Status: DISCONTINUED | OUTPATIENT
Start: 2017-01-01 | End: 2017-01-01

## 2017-01-01 RX ORDER — NITROGLYCERIN 20 MG/100ML
.07-2 INJECTION INTRAVENOUS CONTINUOUS
Status: DISCONTINUED | OUTPATIENT
Start: 2017-01-01 | End: 2017-01-01

## 2017-01-01 RX ORDER — FUROSEMIDE 10 MG/ML
20-100 INJECTION INTRAMUSCULAR; INTRAVENOUS
Status: DISCONTINUED | OUTPATIENT
Start: 2017-01-01 | End: 2017-01-01 | Stop reason: HOSPADM

## 2017-01-01 RX ORDER — PRAVASTATIN SODIUM 20 MG
20 TABLET ORAL EVERY EVENING
Status: DISCONTINUED | OUTPATIENT
Start: 2017-01-01 | End: 2017-01-01

## 2017-01-01 RX ORDER — LIDOCAINE HYDROCHLORIDE AND EPINEPHRINE 10; 10 MG/ML; UG/ML
10-30 INJECTION, SOLUTION INFILTRATION; PERINEURAL
Status: DISCONTINUED | OUTPATIENT
Start: 2017-01-01 | End: 2017-01-01 | Stop reason: HOSPADM

## 2017-01-01 RX ORDER — FUROSEMIDE 10 MG/ML
20 INJECTION INTRAMUSCULAR; INTRAVENOUS ONCE
Status: DISCONTINUED | OUTPATIENT
Start: 2017-01-01 | End: 2017-01-01

## 2017-01-01 RX ORDER — MOXIFLOXACIN 5 MG/ML
1 SOLUTION/ DROPS OPHTHALMIC DAILY PRN
Status: DISCONTINUED | OUTPATIENT
Start: 2017-01-01 | End: 2017-01-01 | Stop reason: HOSPADM

## 2017-01-01 RX ORDER — AMIODARONE HYDROCHLORIDE 200 MG/1
200 TABLET ORAL DAILY
Status: ON HOLD
Start: 2017-01-01 | End: 2017-01-01

## 2017-01-01 RX ORDER — AMLODIPINE BESYLATE 10 MG/1
10 TABLET ORAL DAILY
Status: DISCONTINUED | OUTPATIENT
Start: 2017-01-01 | End: 2017-01-01 | Stop reason: HOSPADM

## 2017-01-01 RX ORDER — POTASSIUM CHLORIDE 1.5 G/1.58G
20-40 POWDER, FOR SOLUTION ORAL
Status: DISCONTINUED | OUTPATIENT
Start: 2017-01-01 | End: 2017-01-01

## 2017-01-01 RX ORDER — LORAZEPAM 0.5 MG/1
.5-1 TABLET ORAL
Status: DISCONTINUED | OUTPATIENT
Start: 2017-01-01 | End: 2017-01-01

## 2017-01-01 RX ORDER — KETOROLAC TROMETHAMINE 30 MG/ML
15 INJECTION, SOLUTION INTRAMUSCULAR; INTRAVENOUS
Status: DISCONTINUED | OUTPATIENT
Start: 2017-01-01 | End: 2017-01-01 | Stop reason: HOSPADM

## 2017-01-01 RX ORDER — HYDRALAZINE HYDROCHLORIDE 20 MG/ML
10 INJECTION INTRAMUSCULAR; INTRAVENOUS EVERY 4 HOURS PRN
Status: DISCONTINUED | OUTPATIENT
Start: 2017-01-01 | End: 2017-01-01 | Stop reason: HOSPADM

## 2017-01-01 RX ORDER — PROCHLORPERAZINE 25 MG
12.5 SUPPOSITORY, RECTAL RECTAL EVERY 12 HOURS PRN
Status: DISCONTINUED | OUTPATIENT
Start: 2017-01-01 | End: 2017-01-01 | Stop reason: HOSPADM

## 2017-01-01 RX ORDER — NALOXONE HYDROCHLORIDE 0.4 MG/ML
0.4 INJECTION, SOLUTION INTRAMUSCULAR; INTRAVENOUS; SUBCUTANEOUS EVERY 5 MIN PRN
Status: DISCONTINUED | OUTPATIENT
Start: 2017-01-01 | End: 2017-01-01 | Stop reason: HOSPADM

## 2017-01-01 RX ORDER — PROCHLORPERAZINE MALEATE 5 MG
5 TABLET ORAL EVERY 6 HOURS PRN
Status: DISCONTINUED | OUTPATIENT
Start: 2017-01-01 | End: 2017-01-01 | Stop reason: HOSPADM

## 2017-01-01 RX ORDER — TRAMADOL HYDROCHLORIDE 50 MG/1
50 TABLET ORAL ONCE
Status: COMPLETED | OUTPATIENT
Start: 2017-01-01 | End: 2017-01-01

## 2017-01-01 RX ORDER — POTASSIUM CHLORIDE 1500 MG/1
20-40 TABLET, EXTENDED RELEASE ORAL
Status: DISCONTINUED | OUTPATIENT
Start: 2017-01-01 | End: 2017-01-01

## 2017-01-01 RX ORDER — PRAVASTATIN SODIUM 40 MG
20 TABLET ORAL DAILY
Status: DISCONTINUED | OUTPATIENT
Start: 2017-01-01 | End: 2017-01-01 | Stop reason: HOSPADM

## 2017-01-01 RX ORDER — CEFAZOLIN SODIUM 1 G/50ML
1250 SOLUTION INTRAVENOUS
Status: DISCONTINUED | OUTPATIENT
Start: 2017-01-01 | End: 2017-01-01

## 2017-01-01 RX ORDER — IBUTILIDE FUMARATE 1 MG/10ML
1 INJECTION, SOLUTION INTRAVENOUS
Status: DISCONTINUED | OUTPATIENT
Start: 2017-01-01 | End: 2017-01-01 | Stop reason: HOSPADM

## 2017-01-01 RX ORDER — DOBUTAMINE HYDROCHLORIDE 200 MG/100ML
5-40 INJECTION INTRAVENOUS CONTINUOUS PRN
Status: DISCONTINUED | OUTPATIENT
Start: 2017-01-01 | End: 2017-01-01 | Stop reason: HOSPADM

## 2017-01-01 RX ORDER — SODIUM CHLORIDE 450 MG/100ML
INJECTION, SOLUTION INTRAVENOUS CONTINUOUS
Status: CANCELLED | OUTPATIENT
Start: 2017-01-01

## 2017-01-01 RX ORDER — IBUTILIDE FUMARATE 1 MG/10ML
0.01 INJECTION, SOLUTION INTRAVENOUS
Status: DISCONTINUED | OUTPATIENT
Start: 2017-01-01 | End: 2017-01-01 | Stop reason: HOSPADM

## 2017-01-01 RX ORDER — MORPHINE SULFATE 2 MG/ML
INJECTION, SOLUTION INTRAMUSCULAR; INTRAVENOUS
Status: COMPLETED
Start: 2017-01-01 | End: 2017-01-01

## 2017-01-01 RX ORDER — MULTIPLE VITAMINS W/ MINERALS TAB 9MG-400MCG
1 TAB ORAL DAILY
Status: DISCONTINUED | OUTPATIENT
Start: 2017-01-01 | End: 2017-01-01

## 2017-01-01 RX ORDER — CEFAZOLIN SODIUM 2 G/100ML
2 INJECTION, SOLUTION INTRAVENOUS
Status: CANCELLED | OUTPATIENT
Start: 2017-01-01

## 2017-01-01 RX ORDER — ADENOSINE 3 MG/ML
6-12 INJECTION, SOLUTION INTRAVENOUS EVERY 5 MIN PRN
Status: DISCONTINUED | OUTPATIENT
Start: 2017-01-01 | End: 2017-01-01 | Stop reason: HOSPADM

## 2017-01-01 RX ORDER — FUROSEMIDE 40 MG
40 TABLET ORAL DAILY
Qty: 30 TABLET | Refills: 3 | Status: ON HOLD | OUTPATIENT
Start: 2017-01-01 | End: 2017-01-01

## 2017-01-01 RX ORDER — AMIODARONE HYDROCHLORIDE 200 MG/1
200 TABLET ORAL 2 TIMES DAILY
Qty: 30 TABLET | Refills: 3 | Status: SHIPPED | OUTPATIENT
Start: 2017-01-01 | End: 2017-01-01

## 2017-01-01 RX ORDER — PROTAMINE SULFATE 10 MG/ML
5-40 INJECTION, SOLUTION INTRAVENOUS EVERY 10 MIN PRN
Status: DISCONTINUED | OUTPATIENT
Start: 2017-01-01 | End: 2017-01-01 | Stop reason: HOSPADM

## 2017-01-01 RX ORDER — MORPHINE SULFATE 2 MG/ML
2 INJECTION, SOLUTION INTRAMUSCULAR; INTRAVENOUS ONCE
Status: DISCONTINUED | OUTPATIENT
Start: 2017-01-01 | End: 2017-01-01

## 2017-01-01 RX ORDER — SODIUM CHLORIDE 450 MG/100ML
INJECTION, SOLUTION INTRAVENOUS CONTINUOUS
Status: DISCONTINUED | OUTPATIENT
Start: 2017-01-01 | End: 2017-01-01 | Stop reason: HOSPADM

## 2017-01-01 RX ORDER — PIPERACILLIN SODIUM, TAZOBACTAM SODIUM 3; .375 G/15ML; G/15ML
3.38 INJECTION, POWDER, LYOPHILIZED, FOR SOLUTION INTRAVENOUS EVERY 6 HOURS
Status: DISCONTINUED | OUTPATIENT
Start: 2017-01-01 | End: 2017-01-01

## 2017-01-01 RX ORDER — METOPROLOL SUCCINATE 25 MG/1
25 TABLET, EXTENDED RELEASE ORAL ONCE
Status: COMPLETED | OUTPATIENT
Start: 2017-01-01 | End: 2017-01-01

## 2017-01-01 RX ORDER — POTASSIUM CL/LIDO/0.9 % NACL 10MEQ/0.1L
10 INTRAVENOUS SOLUTION, PIGGYBACK (ML) INTRAVENOUS
Status: DISCONTINUED | OUTPATIENT
Start: 2017-01-01 | End: 2017-01-01

## 2017-01-01 RX ORDER — POTASSIUM CHLORIDE 29.8 MG/ML
20 INJECTION INTRAVENOUS
Status: DISCONTINUED | OUTPATIENT
Start: 2017-01-01 | End: 2017-01-01

## 2017-01-01 RX ORDER — FENTANYL CITRATE 50 UG/ML
25-50 INJECTION, SOLUTION INTRAMUSCULAR; INTRAVENOUS
Status: DISCONTINUED | OUTPATIENT
Start: 2017-01-01 | End: 2017-01-01 | Stop reason: HOSPADM

## 2017-01-01 RX ORDER — TRAMADOL HYDROCHLORIDE 50 MG/1
50 TABLET ORAL AT BEDTIME
Status: DISCONTINUED | OUTPATIENT
Start: 2017-01-01 | End: 2017-01-01

## 2017-01-01 RX ORDER — FUROSEMIDE 20 MG
20 TABLET ORAL DAILY PRN
Status: DISCONTINUED | OUTPATIENT
Start: 2017-01-01 | End: 2017-01-01

## 2017-01-01 RX ORDER — VANCOMYCIN HYDROCHLORIDE 1 G/200ML
1000 INJECTION, SOLUTION INTRAVENOUS ONCE
Status: COMPLETED | OUTPATIENT
Start: 2017-01-01 | End: 2017-01-01

## 2017-01-01 RX ORDER — PIPERACILLIN SODIUM, TAZOBACTAM SODIUM 2; .25 G/10ML; G/10ML
2.25 INJECTION, POWDER, LYOPHILIZED, FOR SOLUTION INTRAVENOUS EVERY 6 HOURS
Status: DISCONTINUED | OUTPATIENT
Start: 2017-01-01 | End: 2017-01-01 | Stop reason: DRUGHIGH

## 2017-01-01 RX ORDER — DEXTROSE MONOHYDRATE 25 G/50ML
25-50 INJECTION, SOLUTION INTRAVENOUS
Status: DISCONTINUED | OUTPATIENT
Start: 2017-01-01 | End: 2017-01-01

## 2017-01-01 RX ORDER — ACETAMINOPHEN 650 MG/1
650 SUPPOSITORY RECTAL EVERY 4 HOURS PRN
Status: DISCONTINUED | OUTPATIENT
Start: 2017-01-01 | End: 2017-01-01

## 2017-01-01 RX ORDER — LABETALOL HYDROCHLORIDE 5 MG/ML
10 INJECTION, SOLUTION INTRAVENOUS
Status: DISCONTINUED | OUTPATIENT
Start: 2017-01-01 | End: 2017-01-01

## 2017-01-01 RX ORDER — DIPHENHYDRAMINE HYDROCHLORIDE 50 MG/ML
25-50 INJECTION INTRAMUSCULAR; INTRAVENOUS
Status: DISCONTINUED | OUTPATIENT
Start: 2017-01-01 | End: 2017-01-01 | Stop reason: HOSPADM

## 2017-01-01 RX ADMIN — APIXABAN 2.5 MG: 2.5 TABLET, FILM COATED ORAL at 21:56

## 2017-01-01 RX ADMIN — ACETAMINOPHEN 650 MG: 325 TABLET, FILM COATED ORAL at 01:08

## 2017-01-01 RX ADMIN — BRIMONIDINE TARTRATE 1 DROP: 2 SOLUTION/ DROPS OPHTHALMIC at 09:17

## 2017-01-01 RX ADMIN — PIPERACILLIN SODIUM,TAZOBACTAM SODIUM 3.38 G: 3; .375 INJECTION, POWDER, FOR SOLUTION INTRAVENOUS at 10:54

## 2017-01-01 RX ADMIN — BRIMONIDINE TARTRATE 1 DROP: 2 SOLUTION/ DROPS OPHTHALMIC at 21:28

## 2017-01-01 RX ADMIN — AMLODIPINE BESYLATE 5 MG: 5 TABLET ORAL at 08:50

## 2017-01-01 RX ADMIN — MIDAZOLAM HYDROCHLORIDE 1 MG: 1 INJECTION, SOLUTION INTRAMUSCULAR; INTRAVENOUS at 13:17

## 2017-01-01 RX ADMIN — IOPAMIDOL 53 ML: 755 INJECTION, SOLUTION INTRAVENOUS at 19:53

## 2017-01-01 RX ADMIN — POTASSIUM CHLORIDE 20 MEQ: 1.5 POWDER, FOR SOLUTION ORAL at 10:45

## 2017-01-01 RX ADMIN — METOPROLOL TARTRATE 25 MG: 25 TABLET, FILM COATED ORAL at 21:24

## 2017-01-01 RX ADMIN — APIXABAN 2.5 MG: 2.5 TABLET, FILM COATED ORAL at 10:31

## 2017-01-01 RX ADMIN — LISINOPRIL 10 MG: 10 TABLET ORAL at 09:01

## 2017-01-01 RX ADMIN — POTASSIUM CHLORIDE 20 MEQ: 1500 TABLET, EXTENDED RELEASE ORAL at 16:13

## 2017-01-01 RX ADMIN — MORPHINE SULFATE 2 MG: 2 INJECTION, SOLUTION INTRAMUSCULAR; INTRAVENOUS at 10:41

## 2017-01-01 RX ADMIN — METOPROLOL SUCCINATE 75 MG: 50 TABLET, EXTENDED RELEASE ORAL at 09:01

## 2017-01-01 RX ADMIN — LATANOPROST 1 DROP: 50 SOLUTION/ DROPS OPHTHALMIC at 19:43

## 2017-01-01 RX ADMIN — SODIUM CHLORIDE: 4.5 INJECTION, SOLUTION INTRAVENOUS at 11:13

## 2017-01-01 RX ADMIN — LISINOPRIL 20 MG: 20 TABLET ORAL at 11:15

## 2017-01-01 RX ADMIN — METOPROLOL TARTRATE 25 MG: 25 TABLET, FILM COATED ORAL at 12:01

## 2017-01-01 RX ADMIN — POTASSIUM CHLORIDE 20 MEQ: 1500 TABLET, EXTENDED RELEASE ORAL at 09:20

## 2017-01-01 RX ADMIN — APIXABAN 2.5 MG: 2.5 TABLET, FILM COATED ORAL at 21:38

## 2017-01-01 RX ADMIN — ACETAMINOPHEN 650 MG: 325 TABLET, FILM COATED ORAL at 02:10

## 2017-01-01 RX ADMIN — PRAVASTATIN SODIUM 20 MG: 20 TABLET ORAL at 08:15

## 2017-01-01 RX ADMIN — MORPHINE SULFATE 10 MG: 100 SOLUTION ORAL at 14:40

## 2017-01-01 RX ADMIN — MOXIFLOXACIN HYDROCHLORIDE 1 DROP: 5 SOLUTION/ DROPS OPHTHALMIC at 22:44

## 2017-01-01 RX ADMIN — LATANOPROST 1 DROP: 50 SOLUTION/ DROPS OPHTHALMIC at 21:21

## 2017-01-01 RX ADMIN — BRIMONIDINE TARTRATE 1 DROP: 2 SOLUTION/ DROPS OPHTHALMIC at 21:44

## 2017-01-01 RX ADMIN — APIXABAN 2.5 MG: 2.5 TABLET, FILM COATED ORAL at 11:15

## 2017-01-01 RX ADMIN — MORPHINE SULFATE 2 MG: 2 INJECTION, SOLUTION INTRAMUSCULAR; INTRAVENOUS at 02:19

## 2017-01-01 RX ADMIN — LORAZEPAM 1 MG: 2 INJECTION INTRAMUSCULAR; INTRAVENOUS at 02:55

## 2017-01-01 RX ADMIN — MOXIFLOXACIN HYDROCHLORIDE 1 DROP: 5 SOLUTION/ DROPS OPHTHALMIC at 21:33

## 2017-01-01 RX ADMIN — FUROSEMIDE 20 MG: 10 INJECTION, SOLUTION INTRAVENOUS at 16:37

## 2017-01-01 RX ADMIN — LATANOPROST 1 DROP: 50 SOLUTION/ DROPS OPHTHALMIC at 21:24

## 2017-01-01 RX ADMIN — BRIMONIDINE TARTRATE 1 DROP: 2 SOLUTION/ DROPS OPHTHALMIC at 09:03

## 2017-01-01 RX ADMIN — LISINOPRIL 20 MG: 20 TABLET ORAL at 08:50

## 2017-01-01 RX ADMIN — PRAVASTATIN SODIUM 20 MG: 20 TABLET ORAL at 09:02

## 2017-01-01 RX ADMIN — METOPROLOL SUCCINATE 75 MG: 50 TABLET, EXTENDED RELEASE ORAL at 09:44

## 2017-01-01 RX ADMIN — TIMOLOL MALEATE 1 DROP: 5 SOLUTION OPHTHALMIC at 06:05

## 2017-01-01 RX ADMIN — FUROSEMIDE 40 MG: 40 TABLET ORAL at 08:28

## 2017-01-01 RX ADMIN — PIPERACILLIN SODIUM,TAZOBACTAM SODIUM 3.38 G: 3; .375 INJECTION, POWDER, FOR SOLUTION INTRAVENOUS at 14:56

## 2017-01-01 RX ADMIN — TIMOLOL MALEATE 1 DROP: 5 SOLUTION OPHTHALMIC at 08:38

## 2017-01-01 RX ADMIN — POTASSIUM CHLORIDE 40 MEQ: 1.5 POWDER, FOR SOLUTION ORAL at 06:41

## 2017-01-01 RX ADMIN — POTASSIUM CHLORIDE 20 MEQ: 1500 TABLET, EXTENDED RELEASE ORAL at 09:05

## 2017-01-01 RX ADMIN — PRAVASTATIN SODIUM 20 MG: 20 TABLET ORAL at 08:29

## 2017-01-01 RX ADMIN — LORAZEPAM 1 MG: 2 INJECTION INTRAMUSCULAR; INTRAVENOUS at 10:07

## 2017-01-01 RX ADMIN — PIPERACILLIN SODIUM,TAZOBACTAM SODIUM 3.38 G: 3; .375 INJECTION, POWDER, FOR SOLUTION INTRAVENOUS at 08:43

## 2017-01-01 RX ADMIN — TIMOLOL MALEATE 1 DROP: 5 SOLUTION OPHTHALMIC at 21:44

## 2017-01-01 RX ADMIN — VANCOMYCIN HYDROCHLORIDE 1250 MG: 5 INJECTION, POWDER, LYOPHILIZED, FOR SOLUTION INTRAVENOUS at 21:42

## 2017-01-01 RX ADMIN — FUROSEMIDE 40 MG: 10 INJECTION, SOLUTION INTRAVENOUS at 11:10

## 2017-01-01 RX ADMIN — BRIMONIDINE TARTRATE 1 DROP: 2 SOLUTION/ DROPS OPHTHALMIC at 10:59

## 2017-01-01 RX ADMIN — FUROSEMIDE 40 MG: 10 INJECTION, SOLUTION INTRAVENOUS at 23:09

## 2017-01-01 RX ADMIN — FUROSEMIDE 20 MG: 10 INJECTION, SOLUTION INTRAVENOUS at 15:33

## 2017-01-01 RX ADMIN — MORPHINE SULFATE 2 MG: 2 INJECTION, SOLUTION INTRAMUSCULAR; INTRAVENOUS at 22:43

## 2017-01-01 RX ADMIN — APIXABAN 2.5 MG: 2.5 TABLET, FILM COATED ORAL at 09:55

## 2017-01-01 RX ADMIN — ACETAMINOPHEN 650 MG: 325 TABLET, FILM COATED ORAL at 01:11

## 2017-01-01 RX ADMIN — APIXABAN 2.5 MG: 2.5 TABLET, FILM COATED ORAL at 09:02

## 2017-01-01 RX ADMIN — ACETAMINOPHEN 650 MG: 325 TABLET, FILM COATED ORAL at 21:54

## 2017-01-01 RX ADMIN — BRIMONIDINE TARTRATE 1 DROP: 2 SOLUTION/ DROPS OPHTHALMIC at 08:18

## 2017-01-01 RX ADMIN — LORAZEPAM 1 MG: 0.5 TABLET ORAL at 11:43

## 2017-01-01 RX ADMIN — FUROSEMIDE 40 MG: 40 TABLET ORAL at 11:15

## 2017-01-01 RX ADMIN — BRIMONIDINE TARTRATE 1 DROP: 2 SOLUTION/ DROPS OPHTHALMIC at 06:04

## 2017-01-01 RX ADMIN — APIXABAN 2.5 MG: 2.5 TABLET, FILM COATED ORAL at 21:24

## 2017-01-01 RX ADMIN — LATANOPROST 1 DROP: 50 SOLUTION/ DROPS OPHTHALMIC at 21:36

## 2017-01-01 RX ADMIN — PIPERACILLIN SODIUM,TAZOBACTAM SODIUM 3.38 G: 3; .375 INJECTION, POWDER, FOR SOLUTION INTRAVENOUS at 02:08

## 2017-01-01 RX ADMIN — FUROSEMIDE 20 MG: 10 INJECTION, SOLUTION INTRAVENOUS at 17:27

## 2017-01-01 RX ADMIN — TRAMADOL HYDROCHLORIDE 50 MG: 50 TABLET, COATED ORAL at 21:38

## 2017-01-01 RX ADMIN — DEXTRAN 70, AND HYPROMELLOSE 2910 2 DROP: 1; 3 SOLUTION/ DROPS OPHTHALMIC at 10:20

## 2017-01-01 RX ADMIN — FUROSEMIDE 20 MG: 10 INJECTION, SOLUTION INTRAVENOUS at 08:49

## 2017-01-01 RX ADMIN — TIMOLOL MALEATE 1 DROP: 5 SOLUTION OPHTHALMIC at 08:30

## 2017-01-01 RX ADMIN — MORPHINE SULFATE 2 MG: 2 INJECTION, SOLUTION INTRAMUSCULAR; INTRAVENOUS at 14:21

## 2017-01-01 RX ADMIN — MOXIFLOXACIN HYDROCHLORIDE 1 DROP: 5 SOLUTION/ DROPS OPHTHALMIC at 20:42

## 2017-01-01 RX ADMIN — LORAZEPAM 1 MG: 0.5 TABLET ORAL at 21:58

## 2017-01-01 RX ADMIN — Medication 1 TABLET: at 09:43

## 2017-01-01 RX ADMIN — MORPHINE SULFATE 2 MG: 100 SOLUTION ORAL at 08:27

## 2017-01-01 RX ADMIN — NITROGLYCERIN 0.15 MCG/KG/MIN: 20 INJECTION INTRAVENOUS at 19:27

## 2017-01-01 RX ADMIN — ACETAMINOPHEN 325 MG: 325 TABLET, FILM COATED ORAL at 14:02

## 2017-01-01 RX ADMIN — MOXIFLOXACIN HYDROCHLORIDE 1 DROP: 5 SOLUTION/ DROPS OPHTHALMIC at 21:54

## 2017-01-01 RX ADMIN — PRAVASTATIN SODIUM 20 MG: 20 TABLET ORAL at 21:35

## 2017-01-01 RX ADMIN — APIXABAN 2.5 MG: 2.5 TABLET, FILM COATED ORAL at 08:16

## 2017-01-01 RX ADMIN — TIMOLOL MALEATE 1 DROP: 5 SOLUTION OPHTHALMIC at 10:59

## 2017-01-01 RX ADMIN — MULTIPLE VITAMINS W/ MINERALS TAB 1 TABLET: TAB at 08:49

## 2017-01-01 RX ADMIN — ACETAMINOPHEN 650 MG: 325 TABLET, FILM COATED ORAL at 06:06

## 2017-01-01 RX ADMIN — ACETAMINOPHEN 650 MG: 325 TABLET, FILM COATED ORAL at 22:36

## 2017-01-01 RX ADMIN — MORPHINE SULFATE 2 MG: 2 INJECTION, SOLUTION INTRAMUSCULAR; INTRAVENOUS at 10:13

## 2017-01-01 RX ADMIN — MULTIPLE VITAMINS W/ MINERALS TAB 1 TABLET: TAB at 09:55

## 2017-01-01 RX ADMIN — PIPERACILLIN SODIUM,TAZOBACTAM SODIUM 3.38 G: 3; .375 INJECTION, POWDER, FOR SOLUTION INTRAVENOUS at 00:12

## 2017-01-01 RX ADMIN — MORPHINE SULFATE 2 MG: 2 INJECTION, SOLUTION INTRAMUSCULAR; INTRAVENOUS at 21:57

## 2017-01-01 RX ADMIN — SPIRONOLACTONE 25 MG: 25 TABLET ORAL at 08:08

## 2017-01-01 RX ADMIN — PIPERACILLIN SODIUM,TAZOBACTAM SODIUM 3.38 G: 3; .375 INJECTION, POWDER, FOR SOLUTION INTRAVENOUS at 04:33

## 2017-01-01 RX ADMIN — PIPERACILLIN SODIUM,TAZOBACTAM SODIUM 3.38 G: 3; .375 INJECTION, POWDER, FOR SOLUTION INTRAVENOUS at 20:25

## 2017-01-01 RX ADMIN — SODIUM CHLORIDE 81 ML: 9 INJECTION, SOLUTION INTRAVENOUS at 19:53

## 2017-01-01 RX ADMIN — BRIMONIDINE TARTRATE 1 DROP: 2 SOLUTION/ DROPS OPHTHALMIC at 20:47

## 2017-01-01 RX ADMIN — TIMOLOL MALEATE 1 DROP: 5 SOLUTION OPHTHALMIC at 10:09

## 2017-01-01 RX ADMIN — LISINOPRIL 10 MG: 10 TABLET ORAL at 09:43

## 2017-01-01 RX ADMIN — HYDRALAZINE HYDROCHLORIDE 10 MG: 20 INJECTION INTRAMUSCULAR; INTRAVENOUS at 13:20

## 2017-01-01 RX ADMIN — AMLODIPINE BESYLATE 5 MG: 5 TABLET ORAL at 11:15

## 2017-01-01 RX ADMIN — PIPERACILLIN SODIUM,TAZOBACTAM SODIUM 3.38 G: 3; .375 INJECTION, POWDER, FOR SOLUTION INTRAVENOUS at 09:51

## 2017-01-01 RX ADMIN — MORPHINE SULFATE 10 MG: 100 SOLUTION ORAL at 02:08

## 2017-01-01 RX ADMIN — BRIMONIDINE TARTRATE 1 DROP: 2 SOLUTION/ DROPS OPHTHALMIC at 21:20

## 2017-01-01 RX ADMIN — FUROSEMIDE 40 MG: 10 INJECTION, SOLUTION INTRAVENOUS at 09:03

## 2017-01-01 RX ADMIN — VANCOMYCIN HYDROCHLORIDE 1250 MG: 5 INJECTION, POWDER, LYOPHILIZED, FOR SOLUTION INTRAVENOUS at 20:54

## 2017-01-01 RX ADMIN — PIPERACILLIN SODIUM,TAZOBACTAM SODIUM 3.38 G: 3; .375 INJECTION, POWDER, FOR SOLUTION INTRAVENOUS at 16:32

## 2017-01-01 RX ADMIN — LISINOPRIL 20 MG: 20 TABLET ORAL at 08:19

## 2017-01-01 RX ADMIN — ACETAMINOPHEN 650 MG: 325 TABLET, FILM COATED ORAL at 21:41

## 2017-01-01 RX ADMIN — BRIMONIDINE TARTRATE 1 DROP: 2 SOLUTION/ DROPS OPHTHALMIC at 10:08

## 2017-01-01 RX ADMIN — BRIMONIDINE TARTRATE 1 DROP: 2 SOLUTION/ DROPS OPHTHALMIC at 19:43

## 2017-01-01 RX ADMIN — MORPHINE SULFATE 10 MG: 100 SOLUTION ORAL at 13:12

## 2017-01-01 RX ADMIN — BRIMONIDINE TARTRATE 1 DROP: 2 SOLUTION/ DROPS OPHTHALMIC at 22:54

## 2017-01-01 RX ADMIN — POTASSIUM CHLORIDE 40 MEQ: 1.5 POWDER, FOR SOLUTION ORAL at 08:49

## 2017-01-01 RX ADMIN — MORPHINE SULFATE 10 MG: 100 SOLUTION ORAL at 16:02

## 2017-01-01 RX ADMIN — MORPHINE SULFATE 10 MG: 100 SOLUTION ORAL at 12:03

## 2017-01-01 RX ADMIN — AMLODIPINE BESYLATE 5 MG: 5 TABLET ORAL at 11:17

## 2017-01-01 RX ADMIN — APIXABAN 2.5 MG: 2.5 TABLET, FILM COATED ORAL at 08:28

## 2017-01-01 RX ADMIN — PRAVASTATIN SODIUM 20 MG: 20 TABLET ORAL at 08:31

## 2017-01-01 RX ADMIN — POTASSIUM CHLORIDE 20 MEQ: 1500 TABLET, EXTENDED RELEASE ORAL at 11:01

## 2017-01-01 RX ADMIN — LATANOPROST 1 DROP: 50 SOLUTION/ DROPS OPHTHALMIC at 21:26

## 2017-01-01 RX ADMIN — METOPROLOL TARTRATE 25 MG: 25 TABLET, FILM COATED ORAL at 20:58

## 2017-01-01 RX ADMIN — LISINOPRIL 20 MG: 20 TABLET ORAL at 08:44

## 2017-01-01 RX ADMIN — LORAZEPAM 1 MG: 2 INJECTION INTRAMUSCULAR; INTRAVENOUS at 12:59

## 2017-01-01 RX ADMIN — BRIMONIDINE TARTRATE 1 DROP: 2 SOLUTION/ DROPS OPHTHALMIC at 21:30

## 2017-01-01 RX ADMIN — FUROSEMIDE 40 MG: 10 INJECTION, SOLUTION INTRAVENOUS at 18:02

## 2017-01-01 RX ADMIN — FUROSEMIDE 40 MG: 40 TABLET ORAL at 08:31

## 2017-01-01 RX ADMIN — NITROGLYCERIN 0.07 MCG/KG/MIN: 20 INJECTION INTRAVENOUS at 13:40

## 2017-01-01 RX ADMIN — TIMOLOL MALEATE 1 DROP: 5 SOLUTION OPHTHALMIC at 08:51

## 2017-01-01 RX ADMIN — ACETAMINOPHEN 650 MG: 325 TABLET, FILM COATED ORAL at 08:45

## 2017-01-01 RX ADMIN — MORPHINE SULFATE 2 MG: 2 INJECTION, SOLUTION INTRAMUSCULAR; INTRAVENOUS at 08:03

## 2017-01-01 RX ADMIN — MORPHINE SULFATE 2 MG: 2 INJECTION, SOLUTION INTRAMUSCULAR; INTRAVENOUS at 20:52

## 2017-01-01 RX ADMIN — TIMOLOL MALEATE 1 DROP: 5 SOLUTION OPHTHALMIC at 21:41

## 2017-01-01 RX ADMIN — APIXABAN 2.5 MG: 2.5 TABLET, FILM COATED ORAL at 20:43

## 2017-01-01 RX ADMIN — LISINOPRIL 20 MG: 20 TABLET ORAL at 08:38

## 2017-01-01 RX ADMIN — ACETAMINOPHEN 650 MG: 325 TABLET, FILM COATED ORAL at 10:06

## 2017-01-01 RX ADMIN — LATANOPROST 1 DROP: 50 SOLUTION/ DROPS OPHTHALMIC at 21:41

## 2017-01-01 RX ADMIN — LISINOPRIL 10 MG: 10 TABLET ORAL at 08:31

## 2017-01-01 RX ADMIN — PIPERACILLIN SODIUM,TAZOBACTAM SODIUM 3.38 G: 3; .375 INJECTION, POWDER, FOR SOLUTION INTRAVENOUS at 16:13

## 2017-01-01 RX ADMIN — TIMOLOL MALEATE 1 DROP: 5 SOLUTION OPHTHALMIC at 09:03

## 2017-01-01 RX ADMIN — LISINOPRIL 10 MG: 10 TABLET ORAL at 08:28

## 2017-01-01 RX ADMIN — PIPERACILLIN SODIUM,TAZOBACTAM SODIUM 3.38 G: 3; .375 INJECTION, POWDER, FOR SOLUTION INTRAVENOUS at 09:24

## 2017-01-01 RX ADMIN — AMLODIPINE BESYLATE 5 MG: 5 TABLET ORAL at 08:38

## 2017-01-01 RX ADMIN — MULTIPLE VITAMINS W/ MINERALS TAB 1 TABLET: TAB at 08:08

## 2017-01-01 RX ADMIN — HALOPERIDOL 2 MG: 2 SOLUTION ORAL at 09:24

## 2017-01-01 RX ADMIN — MORPHINE SULFATE 10 MG: 100 SOLUTION ORAL at 11:16

## 2017-01-01 RX ADMIN — BRIMONIDINE TARTRATE 1 DROP: 2 SOLUTION/ DROPS OPHTHALMIC at 09:36

## 2017-01-01 RX ADMIN — BRIMONIDINE TARTRATE 1 DROP: 2 SOLUTION/ DROPS OPHTHALMIC at 08:32

## 2017-01-01 RX ADMIN — BRIMONIDINE TARTRATE 1 DROP: 2 SOLUTION/ DROPS OPHTHALMIC at 12:25

## 2017-01-01 RX ADMIN — TIMOLOL MALEATE 1 DROP: 5 SOLUTION OPHTHALMIC at 21:59

## 2017-01-01 RX ADMIN — MORPHINE SULFATE 2 MG: 2 INJECTION, SOLUTION INTRAMUSCULAR; INTRAVENOUS at 16:36

## 2017-01-01 RX ADMIN — TIMOLOL MALEATE 1 DROP: 5 SOLUTION OPHTHALMIC at 20:42

## 2017-01-01 RX ADMIN — FUROSEMIDE 20 MG: 10 INJECTION, SOLUTION INTRAVENOUS at 08:19

## 2017-01-01 RX ADMIN — MORPHINE SULFATE 2 MG: 2 INJECTION, SOLUTION INTRAMUSCULAR; INTRAVENOUS at 19:39

## 2017-01-01 RX ADMIN — PIPERACILLIN SODIUM,TAZOBACTAM SODIUM 3.38 G: 3; .375 INJECTION, POWDER, FOR SOLUTION INTRAVENOUS at 08:24

## 2017-01-01 RX ADMIN — Medication 5 MG: at 10:28

## 2017-01-01 RX ADMIN — LISINOPRIL 20 MG: 20 TABLET ORAL at 08:08

## 2017-01-01 RX ADMIN — SODIUM CHLORIDE 250 MG: 9 INJECTION, SOLUTION INTRAVENOUS at 17:04

## 2017-01-01 RX ADMIN — NITROGLYCERIN 0.2 MCG/KG/MIN: 20 INJECTION INTRAVENOUS at 19:36

## 2017-01-01 RX ADMIN — METOPROLOL TARTRATE 25 MG: 25 TABLET, FILM COATED ORAL at 08:08

## 2017-01-01 RX ADMIN — TIMOLOL MALEATE 1 DROP: 5 SOLUTION OPHTHALMIC at 20:44

## 2017-01-01 RX ADMIN — VANCOMYCIN HYDROCHLORIDE 1250 MG: 5 INJECTION, POWDER, LYOPHILIZED, FOR SOLUTION INTRAVENOUS at 21:22

## 2017-01-01 RX ADMIN — SODIUM CHLORIDE 1000 ML: 9 INJECTION, SOLUTION INTRAVENOUS at 20:07

## 2017-01-01 RX ADMIN — METOPROLOL TARTRATE 25 MG: 25 TABLET, FILM COATED ORAL at 08:18

## 2017-01-01 RX ADMIN — MORPHINE SULFATE 2 MG: 2 INJECTION, SOLUTION INTRAMUSCULAR; INTRAVENOUS at 04:00

## 2017-01-01 RX ADMIN — LATANOPROST 1 DROP: 50 SOLUTION/ DROPS OPHTHALMIC at 21:44

## 2017-01-01 RX ADMIN — LISINOPRIL 20 MG: 20 TABLET ORAL at 10:31

## 2017-01-01 RX ADMIN — AMLODIPINE BESYLATE 5 MG: 5 TABLET ORAL at 08:08

## 2017-01-01 RX ADMIN — MORPHINE SULFATE 2 MG: 2 INJECTION, SOLUTION INTRAMUSCULAR; INTRAVENOUS at 09:44

## 2017-01-01 RX ADMIN — FUROSEMIDE 40 MG: 10 INJECTION, SOLUTION INTRAVENOUS at 09:50

## 2017-01-01 RX ADMIN — TIMOLOL MALEATE 1 DROP: 5 SOLUTION OPHTHALMIC at 21:28

## 2017-01-01 RX ADMIN — TIMOLOL MALEATE 1 DROP: 5 SOLUTION OPHTHALMIC at 21:21

## 2017-01-01 RX ADMIN — METOPROLOL SUCCINATE 75 MG: 50 TABLET, EXTENDED RELEASE ORAL at 21:38

## 2017-01-01 RX ADMIN — METOPROLOL SUCCINATE 75 MG: 50 TABLET, EXTENDED RELEASE ORAL at 21:26

## 2017-01-01 RX ADMIN — TIMOLOL MALEATE 1 DROP: 5 SOLUTION OPHTHALMIC at 05:40

## 2017-01-01 RX ADMIN — SPIRONOLACTONE 25 MG: 25 TABLET ORAL at 11:10

## 2017-01-01 RX ADMIN — APIXABAN 2.5 MG: 2.5 TABLET, FILM COATED ORAL at 21:35

## 2017-01-01 RX ADMIN — MORPHINE SULFATE 10 MG: 100 SOLUTION ORAL at 18:24

## 2017-01-01 RX ADMIN — MULTIPLE VITAMINS W/ MINERALS TAB 1 TABLET: TAB at 08:44

## 2017-01-01 RX ADMIN — PIPERACILLIN SODIUM,TAZOBACTAM SODIUM 3.38 G: 3; .375 INJECTION, POWDER, FOR SOLUTION INTRAVENOUS at 22:42

## 2017-01-01 RX ADMIN — NITROGLYCERIN 0.2 MCG/KG/MIN: 20 INJECTION INTRAVENOUS at 18:08

## 2017-01-01 RX ADMIN — METOPROLOL SUCCINATE 75 MG: 50 TABLET, EXTENDED RELEASE ORAL at 21:23

## 2017-01-01 RX ADMIN — FENTANYL CITRATE 50 MCG: 50 INJECTION, SOLUTION INTRAMUSCULAR; INTRAVENOUS at 13:17

## 2017-01-01 RX ADMIN — MORPHINE SULFATE 2 MG: 2 INJECTION, SOLUTION INTRAMUSCULAR; INTRAVENOUS at 13:34

## 2017-01-01 RX ADMIN — ACETAMINOPHEN 650 MG: 325 TABLET, FILM COATED ORAL at 15:58

## 2017-01-01 RX ADMIN — APIXABAN 2.5 MG: 2.5 TABLET, FILM COATED ORAL at 21:27

## 2017-01-01 RX ADMIN — TRAMADOL HYDROCHLORIDE 50 MG: 50 TABLET, COATED ORAL at 21:24

## 2017-01-01 RX ADMIN — PIPERACILLIN SODIUM,TAZOBACTAM SODIUM 3.38 G: 3; .375 INJECTION, POWDER, FOR SOLUTION INTRAVENOUS at 02:54

## 2017-01-01 RX ADMIN — ACETAMINOPHEN 650 MG: 325 TABLET, FILM COATED ORAL at 02:20

## 2017-01-01 RX ADMIN — PIPERACILLIN SODIUM,TAZOBACTAM SODIUM 3.38 G: 3; .375 INJECTION, POWDER, FOR SOLUTION INTRAVENOUS at 03:00

## 2017-01-01 RX ADMIN — LORAZEPAM 1 MG: 0.5 TABLET ORAL at 17:58

## 2017-01-01 RX ADMIN — BRIMONIDINE TARTRATE 1 DROP: 2 SOLUTION/ DROPS OPHTHALMIC at 20:42

## 2017-01-01 RX ADMIN — FUROSEMIDE 20 MG: 10 INJECTION, SOLUTION INTRAVENOUS at 09:18

## 2017-01-01 RX ADMIN — BRIMONIDINE TARTRATE 1 DROP: 2 SOLUTION/ DROPS OPHTHALMIC at 20:44

## 2017-01-01 RX ADMIN — SODIUM CHLORIDE 250 MG: 9 INJECTION, SOLUTION INTRAVENOUS at 21:37

## 2017-01-01 RX ADMIN — POTASSIUM CHLORIDE 40 MEQ: 1500 TABLET, EXTENDED RELEASE ORAL at 06:52

## 2017-01-01 RX ADMIN — LATANOPROST 1 DROP: 50 SOLUTION/ DROPS OPHTHALMIC at 20:42

## 2017-01-01 RX ADMIN — METOPROLOL SUCCINATE 25 MG: 25 TABLET, EXTENDED RELEASE ORAL at 14:51

## 2017-01-01 RX ADMIN — METOPROLOL TARTRATE 25 MG: 25 TABLET, FILM COATED ORAL at 08:44

## 2017-01-01 RX ADMIN — TIMOLOL MALEATE 1 DROP: 5 SOLUTION OPHTHALMIC at 20:49

## 2017-01-01 RX ADMIN — AMLODIPINE BESYLATE 5 MG: 5 TABLET ORAL at 08:19

## 2017-01-01 RX ADMIN — ACETAMINOPHEN 650 MG: 325 TABLET, FILM COATED ORAL at 20:41

## 2017-01-01 RX ADMIN — FUROSEMIDE 40 MG: 10 INJECTION, SOLUTION INTRAVENOUS at 16:03

## 2017-01-01 RX ADMIN — AMLODIPINE BESYLATE 10 MG: 10 TABLET ORAL at 10:31

## 2017-01-01 RX ADMIN — ACETAMINOPHEN 650 MG: 325 TABLET, FILM COATED ORAL at 07:36

## 2017-01-01 RX ADMIN — FUROSEMIDE 20 MG: 10 INJECTION, SOLUTION INTRAVENOUS at 15:56

## 2017-01-01 RX ADMIN — LATANOPROST 1 DROP: 50 SOLUTION/ DROPS OPHTHALMIC at 21:46

## 2017-01-01 RX ADMIN — METOPROLOL TARTRATE 25 MG: 25 TABLET, FILM COATED ORAL at 08:50

## 2017-01-01 RX ADMIN — TIMOLOL MALEATE 1 DROP: 5 SOLUTION OPHTHALMIC at 21:24

## 2017-01-01 RX ADMIN — PIPERACILLIN SODIUM,TAZOBACTAM SODIUM 3.38 G: 3; .375 INJECTION, POWDER, FOR SOLUTION INTRAVENOUS at 16:09

## 2017-01-01 RX ADMIN — ACETAMINOPHEN 650 MG: 325 TABLET, FILM COATED ORAL at 08:44

## 2017-01-01 RX ADMIN — LATANOPROST 1 DROP: 50 SOLUTION/ DROPS OPHTHALMIC at 20:43

## 2017-01-01 RX ADMIN — FUROSEMIDE 40 MG: 10 INJECTION, SOLUTION INTRAVENOUS at 16:26

## 2017-01-01 RX ADMIN — TIMOLOL MALEATE 1 DROP: 5 SOLUTION OPHTHALMIC at 20:50

## 2017-01-01 RX ADMIN — PIPERACILLIN SODIUM,TAZOBACTAM SODIUM 3.38 G: 3; .375 INJECTION, POWDER, FOR SOLUTION INTRAVENOUS at 21:30

## 2017-01-01 RX ADMIN — PIPERACILLIN SODIUM,TAZOBACTAM SODIUM 3.38 G: 3; .375 INJECTION, POWDER, FOR SOLUTION INTRAVENOUS at 02:34

## 2017-01-01 RX ADMIN — ACETAMINOPHEN 650 MG: 325 TABLET, FILM COATED ORAL at 00:21

## 2017-01-01 RX ADMIN — FUROSEMIDE 40 MG: 10 INJECTION, SOLUTION INTRAVENOUS at 09:55

## 2017-01-01 RX ADMIN — TIMOLOL MALEATE 1 DROP: 5 SOLUTION OPHTHALMIC at 22:51

## 2017-01-01 RX ADMIN — PIPERACILLIN SODIUM,TAZOBACTAM SODIUM 3.38 G: 3; .375 INJECTION, POWDER, FOR SOLUTION INTRAVENOUS at 14:30

## 2017-01-01 RX ADMIN — PIPERACILLIN SODIUM,TAZOBACTAM SODIUM 3.38 G: 3; .375 INJECTION, POWDER, FOR SOLUTION INTRAVENOUS at 20:43

## 2017-01-01 RX ADMIN — MORPHINE SULFATE 2 MG: 2 INJECTION, SOLUTION INTRAMUSCULAR; INTRAVENOUS at 05:51

## 2017-01-01 RX ADMIN — BRIMONIDINE TARTRATE 1 DROP: 2 SOLUTION/ DROPS OPHTHALMIC at 08:39

## 2017-01-01 RX ADMIN — ACETAMINOPHEN 650 MG: 325 TABLET, FILM COATED ORAL at 23:26

## 2017-01-01 RX ADMIN — VANCOMYCIN HYDROCHLORIDE 1000 MG: 1 INJECTION, SOLUTION INTRAVENOUS at 21:17

## 2017-01-01 RX ADMIN — MULTIPLE VITAMINS W/ MINERALS TAB 1 TABLET: TAB at 08:19

## 2017-01-01 RX ADMIN — BRIMONIDINE TARTRATE 1 DROP: 2 SOLUTION/ DROPS OPHTHALMIC at 08:09

## 2017-01-01 RX ADMIN — TIMOLOL MALEATE 1 DROP: 5 SOLUTION OPHTHALMIC at 09:28

## 2017-01-01 RX ADMIN — BRIMONIDINE TARTRATE 1 DROP: 2 SOLUTION/ DROPS OPHTHALMIC at 05:40

## 2017-01-01 RX ADMIN — FUROSEMIDE 20 MG: 10 INJECTION, SOLUTION INTRAVENOUS at 16:32

## 2017-01-01 RX ADMIN — POTASSIUM CHLORIDE 40 MEQ: 1500 TABLET, EXTENDED RELEASE ORAL at 09:01

## 2017-01-01 RX ADMIN — TIMOLOL MALEATE 1 DROP: 5 SOLUTION OPHTHALMIC at 09:34

## 2017-01-01 RX ADMIN — APIXABAN 2.5 MG: 2.5 TABLET, FILM COATED ORAL at 08:38

## 2017-01-01 RX ADMIN — AMLODIPINE BESYLATE 5 MG: 5 TABLET ORAL at 08:44

## 2017-01-01 RX ADMIN — ACETAMINOPHEN 650 MG: 325 TABLET, FILM COATED ORAL at 19:27

## 2017-01-01 RX ADMIN — PIPERACILLIN SODIUM,TAZOBACTAM SODIUM 3.38 G: 3; .375 INJECTION, POWDER, FOR SOLUTION INTRAVENOUS at 08:07

## 2017-01-01 RX ADMIN — CHLORPROMAZINE HYDROCHLORIDE 25 MG: 10 TABLET, SUGAR COATED ORAL at 12:03

## 2017-01-01 RX ADMIN — PIPERACILLIN SODIUM,TAZOBACTAM SODIUM 3.38 G: 3; .375 INJECTION, POWDER, FOR SOLUTION INTRAVENOUS at 14:28

## 2017-01-01 RX ADMIN — FUROSEMIDE 20 MG: 10 INJECTION, SOLUTION INTRAVENOUS at 16:09

## 2017-01-01 RX ADMIN — ACETAMINOPHEN 650 MG: 325 TABLET, FILM COATED ORAL at 09:20

## 2017-01-01 RX ADMIN — MORPHINE SULFATE 2 MG: 2 INJECTION, SOLUTION INTRAMUSCULAR; INTRAVENOUS at 23:54

## 2017-01-01 RX ADMIN — TRAMADOL HYDROCHLORIDE 50 MG: 50 TABLET, COATED ORAL at 21:35

## 2017-01-01 RX ADMIN — LATANOPROST 1 DROP: 50 SOLUTION/ DROPS OPHTHALMIC at 22:50

## 2017-01-01 RX ADMIN — ACETAMINOPHEN 650 MG: 325 TABLET, FILM COATED ORAL at 03:00

## 2017-01-01 RX ADMIN — BRIMONIDINE TARTRATE 1 DROP: 2 SOLUTION/ DROPS OPHTHALMIC at 21:43

## 2017-01-01 RX ADMIN — LISINOPRIL 20 MG: 20 TABLET ORAL at 09:55

## 2017-01-01 RX ADMIN — FUROSEMIDE 20 MG: 10 INJECTION, SOLUTION INTRAVENOUS at 08:43

## 2017-01-01 RX ADMIN — PIPERACILLIN SODIUM,TAZOBACTAM SODIUM 3.38 G: 3; .375 INJECTION, POWDER, FOR SOLUTION INTRAVENOUS at 20:41

## 2017-01-01 RX ADMIN — TIMOLOL MALEATE 1 DROP: 5 SOLUTION OPHTHALMIC at 21:58

## 2017-01-01 RX ADMIN — PIPERACILLIN SODIUM,TAZOBACTAM SODIUM 3.38 G: 3; .375 INJECTION, POWDER, FOR SOLUTION INTRAVENOUS at 03:01

## 2017-01-01 RX ADMIN — METOPROLOL TARTRATE 25 MG: 25 TABLET, FILM COATED ORAL at 20:50

## 2017-01-01 RX ADMIN — LATANOPROST 1 DROP: 50 SOLUTION/ DROPS OPHTHALMIC at 20:48

## 2017-01-01 RX ADMIN — FUROSEMIDE 40 MG: 40 TABLET ORAL at 09:43

## 2017-01-01 RX ADMIN — LATANOPROST 1 DROP: 50 SOLUTION/ DROPS OPHTHALMIC at 20:50

## 2017-01-01 RX ADMIN — ACETAMINOPHEN 650 MG: 325 TABLET, FILM COATED ORAL at 13:10

## 2017-01-01 RX ADMIN — PRAVASTATIN SODIUM 20 MG: 40 TABLET ORAL at 21:56

## 2017-01-01 RX ADMIN — NITROGLYCERIN 0.1 MCG/KG/MIN: 20 INJECTION INTRAVENOUS at 14:59

## 2017-01-01 RX ADMIN — FUROSEMIDE 20 MG: 10 INJECTION, SOLUTION INTRAVENOUS at 08:17

## 2017-01-01 RX ADMIN — METOPROLOL TARTRATE 25 MG: 25 TABLET, FILM COATED ORAL at 21:32

## 2017-01-01 RX ADMIN — TIMOLOL MALEATE 1 DROP: 5 SOLUTION OPHTHALMIC at 08:08

## 2017-01-01 RX ADMIN — METOPROLOL TARTRATE 25 MG: 25 TABLET, FILM COATED ORAL at 21:39

## 2017-01-01 RX ADMIN — METOPROLOL SUCCINATE 75 MG: 50 TABLET, EXTENDED RELEASE ORAL at 08:28

## 2017-01-01 RX ADMIN — MORPHINE SULFATE 5 MG: 100 SOLUTION ORAL at 10:44

## 2017-01-01 RX ADMIN — TRAMADOL HYDROCHLORIDE 50 MG: 50 TABLET, COATED ORAL at 21:42

## 2017-01-01 RX ADMIN — MORPHINE SULFATE 10 MG: 100 SOLUTION ORAL at 22:43

## 2017-01-01 RX ADMIN — PIPERACILLIN SODIUM,TAZOBACTAM SODIUM 3.38 G: 3; .375 INJECTION, POWDER, FOR SOLUTION INTRAVENOUS at 13:59

## 2017-01-01 RX ADMIN — METOPROLOL SUCCINATE 50 MG: 50 TABLET, EXTENDED RELEASE ORAL at 08:17

## 2017-01-01 RX ADMIN — BRIMONIDINE TARTRATE 1 DROP: 2 SOLUTION/ DROPS OPHTHALMIC at 10:36

## 2017-01-01 RX ADMIN — MULTIPLE VITAMINS W/ MINERALS TAB 1 TABLET: TAB at 09:43

## 2017-01-01 RX ADMIN — LORAZEPAM 1 MG: 0.5 TABLET ORAL at 08:55

## 2017-01-01 RX ADMIN — MORPHINE SULFATE 10 MG: 100 SOLUTION ORAL at 00:48

## 2017-01-01 RX ADMIN — BRIMONIDINE TARTRATE 1 DROP: 2 SOLUTION/ DROPS OPHTHALMIC at 20:57

## 2017-01-01 RX ADMIN — METOPROLOL SUCCINATE 75 MG: 50 TABLET, EXTENDED RELEASE ORAL at 08:31

## 2017-01-01 RX ADMIN — TRAMADOL HYDROCHLORIDE 50 MG: 50 TABLET, COATED ORAL at 21:27

## 2017-01-01 RX ADMIN — DILTIAZEM HYDROCHLORIDE 10 MG: 5 INJECTION INTRAVENOUS at 04:11

## 2017-01-01 RX ADMIN — SODIUM CHLORIDE 250 MG: 9 INJECTION, SOLUTION INTRAVENOUS at 04:45

## 2017-01-01 RX ADMIN — ACETAMINOPHEN 650 MG: 325 TABLET, FILM COATED ORAL at 06:03

## 2017-01-01 RX ADMIN — PIPERACILLIN SODIUM,TAZOBACTAM SODIUM 3.38 G: 3; .375 INJECTION, POWDER, FOR SOLUTION INTRAVENOUS at 00:19

## 2017-01-01 RX ADMIN — PIPERACILLIN SODIUM,TAZOBACTAM SODIUM 3.38 G: 3; .375 INJECTION, POWDER, FOR SOLUTION INTRAVENOUS at 10:18

## 2017-01-01 RX ADMIN — APIXABAN 2.5 MG: 2.5 TABLET, FILM COATED ORAL at 08:31

## 2017-01-01 RX ADMIN — BRIMONIDINE TARTRATE 1 DROP: 2 SOLUTION/ DROPS OPHTHALMIC at 21:24

## 2017-01-01 RX ADMIN — PIPERACILLIN SODIUM,TAZOBACTAM SODIUM 3.38 G: 3; .375 INJECTION, POWDER, FOR SOLUTION INTRAVENOUS at 05:48

## 2017-01-01 RX ADMIN — MORPHINE SULFATE 2 MG: 2 INJECTION, SOLUTION INTRAMUSCULAR; INTRAVENOUS at 02:04

## 2017-01-01 RX ADMIN — MORPHINE SULFATE 10 MG: 100 SOLUTION ORAL at 17:22

## 2017-01-01 RX ADMIN — APIXABAN 2.5 MG: 2.5 TABLET, FILM COATED ORAL at 09:43

## 2017-01-01 RX ADMIN — PIPERACILLIN SODIUM,TAZOBACTAM SODIUM 3.38 G: 3; .375 INJECTION, POWDER, FOR SOLUTION INTRAVENOUS at 20:10

## 2017-01-01 RX ADMIN — AMLODIPINE BESYLATE 10 MG: 10 TABLET ORAL at 09:55

## 2017-01-01 RX ADMIN — ACETAMINOPHEN 325 MG: 325 TABLET, FILM COATED ORAL at 05:43

## 2017-01-01 RX ADMIN — FUROSEMIDE 20 MG: 10 INJECTION, SOLUTION INTRAVENOUS at 16:13

## 2017-01-01 RX ADMIN — TIMOLOL MALEATE 1 DROP: 5 SOLUTION OPHTHALMIC at 12:25

## 2017-01-01 RX ADMIN — LATANOPROST 1 DROP: 50 SOLUTION/ DROPS OPHTHALMIC at 21:59

## 2017-01-01 RX ADMIN — FUROSEMIDE 20 MG: 10 INJECTION, SOLUTION INTRAVENOUS at 08:07

## 2017-01-01 RX ADMIN — BRIMONIDINE TARTRATE 1 DROP: 2 SOLUTION/ DROPS OPHTHALMIC at 21:59

## 2017-01-01 ASSESSMENT — CHADS2 SCORE
VASCULAR DISEASE: YES
STROK/TIA/THROM: NO
AGE: >74
DIABETES: NO
AGE: >74
SEX: FEMALE
STROK/TIA/THROM: NO
HTN: YES
SEX: FEMALE
CHF: YES
DIABETES: NO
CHF: YES
HTN: YES
VASCULAR DISEASE: YES

## 2017-01-01 ASSESSMENT — ACTIVITIES OF DAILY LIVING (ADL)
TOILETING: 1-->ASSISTIVE EQUIPMENT
RETIRED_COMMUNICATION: 0-->UNDERSTANDS/COMMUNICATES WITHOUT DIFFICULTY
BATHING: 1-->ASSISTIVE EQUIPMENT
COGNITION: 1 - ATTENTION OR MEMORY DEFICITS
BATHING: 1-->ASSISTIVE EQUIPMENT
FALL_HISTORY_WITHIN_LAST_SIX_MONTHS: YES
TRANSFERRING: 1-->ASSISTIVE EQUIPMENT
COGNITION: 1 - ATTENTION OR MEMORY DEFICITS
DRESS: 0-->INDEPENDENT
TRANSFERRING: 1-->ASSISTIVE EQUIPMENT
FALL_HISTORY_WITHIN_LAST_SIX_MONTHS: YES
TRANSFERRING: 1-->ASSISTIVE EQUIPMENT
SWALLOWING: 0-->SWALLOWS FOODS/LIQUIDS WITHOUT DIFFICULTY
RETIRED_EATING: 0-->INDEPENDENT
AMBULATION: 1-->ASSISTIVE EQUIPMENT
EATING: 0-->INDEPENDENT
COMMUNICATION: 0-->UNDERSTANDS/COMMUNICATES WITHOUT DIFFICULTY
RETIRED_EATING: 0-->INDEPENDENT
AMBULATION: 1-->ASSISTIVE EQUIPMENT
SWALLOWING: 0-->SWALLOWS FOODS/LIQUIDS WITHOUT DIFFICULTY
TOILETING: 1-->ASSISTIVE EQUIPMENT
BATHING: 3-->ASSISTIVE EQUIPMENT AND PERSON
DRESS: 0-->INDEPENDENT
RETIRED_COMMUNICATION: 0-->UNDERSTANDS/COMMUNICATES WITHOUT DIFFICULTY
DRESS: 2-->ASSISTIVE PERSON
AMBULATION: 1-->ASSISTIVE EQUIPMENT
TOILETING: 1-->ASSISTIVE EQUIPMENT
SWALLOWING: 0-->SWALLOWS FOODS/LIQUIDS WITHOUT DIFFICULTY

## 2017-01-01 ASSESSMENT — ENCOUNTER SYMPTOMS
PALPITATIONS: 0
SHORTNESS OF BREATH: 1
LIGHT-HEADEDNESS: 1
WEAKNESS: 1
SHORTNESS OF BREATH: 1
SHORTNESS OF BREATH: 1
COUGH: 1

## 2017-01-01 ASSESSMENT — PAIN DESCRIPTION - DESCRIPTORS
DESCRIPTORS: PATIENT UNABLE TO DESCRIBE
DESCRIPTORS: ACHING
DESCRIPTORS: PATIENT UNABLE TO DESCRIBE
DESCRIPTORS: PATIENT UNABLE TO DESCRIBE
DESCRIPTORS: ACHING
DESCRIPTORS: DISCOMFORT
DESCRIPTORS: ACHING
DESCRIPTORS: ACHING

## 2017-01-03 NOTE — PATIENT INSTRUCTIONS
1. Reviewed your symptoms of getting super tired after you take your morning medications   *Don't want to decrease your metoprolol because worried that your heart rate will go too fast if you go back into atrial fibrillation     2. CHANGE your metoprolol succinate from 100 mg in the morning to 50 mg twice a day (morning and night). I have sent a [facility] Rx in [facility] for the 50 mg tabs    3. Continue the lisinopril 10 mg daily AT NIGHT    4. My nurse is Veronica: [phone]    5. See me again in ~ 2 months but call if need to be seen sooner!

## 2017-01-03 NOTE — PROGRESS NOTES
"HISTORY OF PRESENT ILLNESS:  I had the pleasure of seeing Rebecca today when she came accompanied by her niece, Tavia, who actually works on the orthopedic floor here at [facility].  She is a very pleasant 88-year-old whose history is well documented in my note from just a few weeks ago on 12/22.  At that time, we reviewed her recent hospitalization for heart failure with preserved ejection fraction, brought upon by rapid atrial fibrillation.  At that hospitalization, her metoprolol was increased from 50 to 100 mg daily.  Her stress test was negative for ischemia.       When I last saw her on 12/22, she was complaining of some sensations of \"doom\" feeling like she was about to die and that she would just \"go out.\"  She never specifically complained of lightheadedness or dizziness, but though she was a poor historian, it seemed that this is what that correlated the most with.  Due to concerns regarding hypotension when I checked her in clinic, I actually had her continue metoprolol  mg in the morning and asked her to take lisinopril 10 mg at night.  At that time we were also able to get a pacemaker interrogation.  This showed that while she did have some PACs and PVCs, they were not actually symptomatic and there was no arrhythmia to correlate with these symptoms.      She ended up presenting to the hospital the following day feeling unwell.  It turns out that she was taking actually 150 mg of metoprolol in the morning and 10 mg at night.  This was since rectified and she is now back for followup.      Rebecca tells me that since she last saw me, she has not had any further episodes of this \"doom\" feeling.  She has not had any falls or faints.  She feels overall that she is doing quite a bit better.      She does note that about half an hour after she takes her medications in the morning, she feels very \"blah\" and feels like she wants to go back to sleep.  She admits to not sleeping well at night, telling " "me that she gets both to go to the bathroom as well as sometimes to eat.  She sleeps then too much during the day.      She denies any problems with chest pain.  Interestingly, her \"throat blocking\" which prompted Dr. Suresh to order a stress test done in the hospital, has not recurred over the last few days.  She has not had any significant palpitations.  She does note some lower extremity edema, mostly in the feet which seems to improve first thing in the morning and worsens throughout the day.      Echocardiogram done 12/2016 continued to show an EF of 55%-60%.  She has grade 3 diastolic dysfunction, 2+ MR and 2+ TR with moderate to severe pulmonary hypertension.  At that time she was noted to have severe aortic stenosis with a mean gradient of 11 and a small aortic valve.  It was felt that this was more severe despite the gradient because of low cardiac output.   Of note, I have spoken with Dr. Parker's nurse, Carmen, and Dr. Parker has reviewed her echocardiogram.  She feels that her echo.  echocardiogram is more reflective of moderate aortic stenosis for which he has recommended surveillance.      Last stress test done in the hospital 12/09/2016 showed no evidence of ischemia.        ASSESSMENT AND PLAN:     1.  \"Spells.\"  When I last saw her, we discussed switching her metoprolol to the morning and lisinopril at night.  Unfortunately, she mixed up some of her medications and actually was taking 150 mg of metoprolol in the morning and lisinopril 10 mg at night and she felt poorly with this.  She is now taking her medications appropriately but does get some significant fatigue about half an hour after she takes the metoprolol.     At this time.  We will therefore switch her metoprolol  mg every morning to 50 mg twice daily.  She will continue lisinopril daily at night.  I am hopeful that this does not cause her any significant fatigue or lightheaded spells.   She will continue to follow this.  Her blood " "pressure today was under good control and she does have a home health nurse and they can certainly check into things as well.       2.  Recent heart failure with preserved ejection fraction.  She is actually euvolemic, but does have some lower extremity edema noted.  Her weight is stable since I last saw her despite this edema.  At this time, I would not add a diuretic given the fact that I already worry about hypotension, but we will have her continue to monitor this and follow a low-sodium diet.     3.  Paroxysmal atrial fibrillation.  Again, device interrogation had previously shown evidence of atrial fibrillation as well as atrial tachycardia and ectopy.  She had significant lower extremity edema on diltiazem and is now on metoprolol, and it was felt that rapid atrial fibrillation caused her episode of heart failure with preserved ejection fraction.     At this time, we will have her switch her metoprolol to 50 mg twice daily.  She will continue anticoagulation.  We will continue to follow her pacemaker very closely.  Of note, her feelings of \"doom\" and \"lightheadedness\" did not correlate with any atrial arrhythmias     4.  Aortic stenosis.  Echocardiogram done in December showed a mean gradient of 11.  Carmen and Dr. Parker reviewed her echocardiogram and he felt that the stroke volume was just slightly low at 47 with a velocity of 2.09.  Mean gradient as above was 11 and therefore he feels that her AS is just moderate.  He has recommended followup echocardiogram in approximately 6 months.      She will see me in a couple of months just to make sure she continues to do well.  I have encouraged home health to contact us could we be of any assistance.         ASHU OLIVARES PA-C             D: 2017 14:40   T: 2017 15:55   MT: JESSICA      Name:     ANTHONY KEMP   MRN:      [id number]        Account:      [id number]   :      1928           Service Date: 2017      Document: [id number]    "

## 2017-01-03 NOTE — PROGRESS NOTES
"Veronica - could you pls call Rebecca's Home Health RN re: medication change I made?    She gets super fatigued after her AM meds but overall has not had anymore \"doom\" feelings and thinks that moving the lisinopril 10 mg to night and metoprolol  to morning has helped.    Due to this fatigue that happens 30-45 mins after her AM meds, I changed her metoprolol XL from 100 mg daily to 50 mg bid (and sent new Rx in) and kept her lisinopril 10 mg at night.    Her niece Tavia was here today so am hopeful that there will be no mix ups, but pls call Home Health and let them know.    Thx!  "

## 2017-01-03 NOTE — PROGRESS NOTES
HPI and Plan:   See dictation #[id number]    Orders Placed This Encounter   Procedures     Basic metabolic panel     Follow-Up with Cardiac Advanced Practice Provider       Orders Placed This Encounter   Medications     metoprolol (TOPROL-XL) 50 MG 24 hr tablet     Sig: Take 1 tablet (50 mg) by mouth 2 times daily     Dispense:  180 tablet     Refill:  5     Replaces 100 mg daily       Medications Discontinued During This Encounter   Medication Reason     metoprolol (TOPROL-XL) 100 MG 24 hr tablet Reorder         Encounter Diagnoses   Name Primary?     Paroxysmal a-fib (H)      Acute on chronic diastolic congestive heart failure (H) Yes     Pacemaker        CURRENT MEDICATIONS:  Current Outpatient Prescriptions   Medication Sig Dispense Refill     metoprolol (TOPROL-XL) 50 MG 24 hr tablet Take 1 tablet (50 mg) by mouth 2 times daily 180 tablet 5     lisinopril (PRINIVIL/ZESTRIL) 10 MG tablet Take 1 tablet (10 mg) by mouth daily at night       order for DME Equipment being ordered: Walker Wheels () and Walker ()  Treatment Diagnosis: difficulty with gait 1 each 0     moxifloxacin (VIGAMOX) 0.5 % ophthalmic solution Place 1 drop Into the left eye daily       apixaban ANTICOAGULANT (ELIQUIS) 2.5 MG tablet Take 2.5 mg by mouth 2 times daily  90 tablet 1     brimonidine (ALPHAGAN-P) 0.15 % ophthalmic solution Place 1 drop into both eyes 2 times daily        pravastatin (PRAVACHOL) 20 MG tablet Take 1 tablet (20 mg) by mouth daily 30 tablet 11     calcium-vitamin D (CALTRATE) 600-400 MG-UNIT per tablet Take 1 tablet by mouth 2 times daily       latanoprost (XALATAN) 0.005 % ophthalmic solution Place 1 drop into both eyes At Bedtime       timolol (TIMOPTIC) 0.5 % ophthalmic solution Place 1 drop into both eyes 2 times daily       TRAMADOL HCL PO Take 50 mg by mouth At Bedtime Patient takes scheduled       multivitamin, therapeutic with minerals (MULTI-VITAMIN) TABS Take 1 tablet by mouth daily.       [DISCONTINUED]  metoprolol (TOPROL-XL) 100 MG 24 hr tablet Take 1 tablet (100 mg) by mouth daily 30 tablet 0       ALLERGIES   No Known Allergies    PAST MEDICAL HISTORY:  Past Medical History   Diagnosis Date     Back pain      Syncope      CAD (coronary artery disease)      nonobstructive     HTN (hypertension)      Dyslipidemia      Osteoporosis      Glaucoma      Sinus node dysfunction (H)      sp DDD PM     Carotid stenosis      left side     Pulmonary hypertension (H)      moderate to severe     Paroxysmal atrial fibrillation (H) 9/12/14     PM detected, <1 minute each time     Aortic stenosis      moderate to severe       PAST SURGICAL HISTORY:  Past Surgical History   Procedure Laterality Date     Gyn surgery       OVARIAN CYSTECTOMY     Ent surgery       TONSILLECTOMY/BILAT EAR SURGERY     Eye surgery       BILAT CATARACT-IOL     Dilation and curettage, hysteroscopy diagnostic, combined  1/16/2013     Procedure: COMBINED DILATION AND CURETTAGE, HYSTEROSCOPY DIAGNOSTIC;   DILATION AND CURETTAGE, HYSTEROSCOPY;  Surgeon: Ozzie Lo MD;  [facility]: [facility]     Implant pacemaker       dual chamber       FAMILY HISTORY:  Family History   Problem Relation Age of Onset     Unknown/Adopted Mother      Myocardial Infarction Father      Heart Surgery Father        SOCIAL HISTORY:  Social History     Social History     Marital Status:      Spouse Name: N/A     Number of Children: N/A     Years of Education: N/A     Social History Main Topics     Smoking status: Never Smoker      Smokeless tobacco: None     Alcohol Use: Yes      Comment: OCC     Drug Use: No     Sexual Activity: Not Asked     Other Topics Concern     Caffeine Concern No     2-3 cups decaf coffee per day     Sleep Concern No     Weight Concern Yes     weight loss     Special Diet No     Exercise Yes     exercises at home     Social History Narrative       Review of Systems:  Skin:  Negative       Eyes:  Negative      ENT:  Negative      Respiratory:   "Negative for dyspnea on exertion;shortness of breath;cough;wheezing     Cardiovascular:  Negative for;edema;palpitations;dizziness;chest pain;lightheadedness;fatigue Positive for;edema    Gastroenterology: Positive for constipation    Genitourinary:  Negative      Musculoskeletal:  Positive for back pain    Neurologic:  Positive for   Neuropathy  Psychiatric:  not assessed      Heme/Lymph/Imm:  Negative for bleeding disorder    Endocrine:  Negative for diabetes      Physical Exam:  Vitals: /72 mmHg  Pulse 68  Ht 1.549 m (5' 1\")  Wt 56.201 kg (123 lb 14.4 oz)  BMI 23.42 kg/m2    Constitutional:  cooperative, alert and oriented, well developed, well nourished, in no acute distress        Skin:  warm and dry to the touch, no apparent skin lesions or masses noted        Head:  normocephalic, no masses or lesions        Eyes:  pupils equal and round;conjunctivae and lids unremarkable;sclera white        ENT:  no pallor or cyanosis, dentition good        Neck:  JVP normal transmitted murmur      Chest:  normal breath sounds, clear to auscultation, normal A-P diameter, normal symmetry, normal respiratory excursion, no use of accessory muscles   pacemaker incision in the left infraclavicular area was well-healed      Cardiac: regular rhythm       systolic murmur          Abdomen:  abdomen soft        Vascular: pulses full and equal                                        Extremities and Back:  no deformities, clubbing, cyanosis, erythema observed   bilateral LE edema;1+     foot    Neurological:  affect appropriate, oriented to time, person and place                    "

## 2017-01-03 NOTE — MR AVS SNAPSHOT
After Visit Summary   1/3/2017    Rebecca Helms    MRN: [id number]           Patient Information     Date Of Birth          6/18/1928        Visit Information        Provider Department      1/3/2017 1:50 PM Lisbet Chakraborty PA-C [facility] [place]        Today's Diagnoses     Chronic atrial fibrillation (H)    -  1     Paroxysmal a-fib (H)           Care Instructions    1. Reviewed your symptoms of getting super tired after you take your morning medications   *Don't want to decrease your metoprolol because worried that your heart rate will go too fast if you go back into atrial fibrillation     2. CHANGE your metoprolol succinate from 100 mg in the morning to 50 mg twice a day (morning and night). I have [facility] for the 50 mg tabs    3. Continue the lisinopril 10 mg daily AT NIGHT    4. My nurse is Veronica: [phone]    5. See me again in ~ 2 months but call if need to be seen sooner!        Follow-ups after your visit        Additional Services     Follow-Up with Cardiac Advanced Practice Provider                 Your next 10 appointments already scheduled     Mar 09, 2017  2:00 PM   Remote PPM Check with MADRID TECH1   [facility])    [address] [place] [id number] [phone]           This appointment is for a remote check of your pacemaker.  This is not an appointment at the office.              Future tests that were ordered for you today     Open Future Orders        Priority Expected Expires Ordered    Basic metabolic panel Routine 3/3/2017 1/3/2018 1/3/2017    Follow-Up with Cardiac Advanced Practice Provider Routine 3/3/2017 1/3/2018 1/3/2017            Who to contact     If you have questions or need follow up information about today's clinic visit or your schedule please contact [facility] directly  "at [phone].  Normal or non-critical lab and imaging results will be communicated to you by [employer], letter or phone within 4 business days after the clinic has received the results. If you do not hear from us within 7 days, please contact the clinic through [employer] or phone. If you have a critical or abnormal lab result, we will notify you by phone as soon as possible.  Submit refill requests through [employer] or call your pharmacy and they will forward the refill request to us. Please allow 3 business days for your refill to be completed.          Additional Information About Your Visit        [employer] Information     [employer] gives you secure access to your electronic health record. If you see a primary care provider, you can also send messages to your care team and make appointments. If you have questions, please call your primary care clinic.  If you do not have a primary care provider, please call [phone] and they will assist you.        Care EveryWhere ID     This is your Care EveryWhere ID. This could be used by other organizations to access your [facility] medical records  [id number]        Your Vitals Were     Pulse Height BMI (Body Mass Index)             68 1.549 m (5' 1\") 23.42 kg/m2          Blood Pressure from Last 3 Encounters:   01/03/17 118/72   12/23/16 156/83   12/22/16 98/68    Weight from Last 3 Encounters:   01/03/17 56.201 kg (123 lb 14.4 oz)   12/23/16 54.432 kg (120 lb)   12/22/16 55.792 kg (123 lb)              We Performed the Following     Follow-Up with Cardiac Advanced Practice Provider          Today's Medication Changes          These changes are accurate as of: 1/3/17  2:29 PM.  If you have any questions, ask your nurse or doctor.               These medicines have changed or have updated prescriptions.        Dose/Directions    metoprolol 50 MG 24 hr tablet   Commonly known as:  TOPROL-XL   This may have changed:    - medication strength  - how much to take  - when to take " this   Used for:  Chronic atrial fibrillation (H)   Changed by:  Lisbet Chakraborty PA-C        Dose:  50 mg   Take 1 tablet (50 mg) by mouth 2 times daily   Quantity:  180 tablet   Refills:  5            Where to get your medicines      These medications were sent to [id number] - [place], [place] - [address] [facility]  [address] [place] [address]    Hours:  24-hours Phone:  [phone]    - metoprolol 50 MG 24 hr tablet             Primary Care Provider Office Phone # Fax #    Ren Gipson [phone]       [facility] [address] [address]  [facility] [address]        Thank you!     Thank you for choosing [facility] [place]  for your care. Our goal is always to provide you with excellent care. Hearing back from our patients is one way we can continue to improve our services. Please take a few minutes to complete the written survey that you may receive in the mail after your visit with us. Thank you!             Your Updated Medication List - Protect others around you: Learn how to safely use, store and throw away your medicines at www.disposemymeds.org.          This list is accurate as of: 1/3/17  2:29 PM.  Always use your most recent med list.                   Brand Name Dispense Instructions for use    apixaban ANTICOAGULANT 2.5 MG tablet    ELIQUIS    90 tablet    Take 2.5 mg by mouth 2 times daily       brimonidine 0.15 % ophthalmic solution    ALPHAGAN-P     Place 1 drop into both eyes 2 times daily       calcium-vitamin D 600-400 MG-UNIT per tablet    CALTRATE     Take 1 tablet by mouth 2 times daily       latanoprost 0.005 % ophthalmic solution    XALATAN     Place 1 drop into both eyes At Bedtime       lisinopril 10 MG tablet    PRINIVIL/ZESTRIL     Take 1 tablet (10 mg) by mouth daily at night       metoprolol 50 MG 24 hr tablet    TOPROL-XL    180 tablet    Take 1 tablet (50 mg) by  mouth 2 times daily       moxifloxacin 0.5 % ophthalmic solution    VIGAMOX     Place 1 drop Into the left eye daily       Multi-vitamin Tabs tablet      Take 1 tablet by mouth daily.       order for DME     1 each    Equipment being ordered: Walker Wheels () and Walker () Treatment Diagnosis: difficulty with gait       pravastatin 20 MG tablet    PRAVACHOL    30 tablet    Take 1 tablet (20 mg) by mouth daily       timolol 0.5 % ophthalmic solution    TIMOPTIC     Place 1 drop into both eyes 2 times daily       TRAMADOL HCL PO      Take 50 mg by mouth At Bedtime Patient takes scheduled

## 2017-01-03 NOTE — Clinical Note
"1/3/2017    MD [facility]   [id number]  [address]  [facility] [address]    RE: Rebecca Helms       Dear Colleague,    I had the pleasure of seeing Rebecca Helms in the [facility].    HISTORY OF PRESENT ILLNESS:  I had the pleasure of seeing Rebecca today when she came accompanied by her niece, Tavia, who actually works on the orthopedic floor here at [facility].  She is a very pleasant 88-year-old whose history is well documented in my note from just a few weeks ago on 12/22.  At that time, we reviewed her recent hospitalization for heart failure with preserved ejection fraction, brought upon by rapid atrial fibrillation.  At that hospitalization, her metoprolol was increased from 50 to 100 mg daily.  Her stress test was negative for ischemia.       When I last saw her on 12/22, she was complaining of some sensations of \"doom\" feeling like she was about to die and that she would just \"go out.\"  She never specifically complained of lightheadedness or dizziness, but though she was a poor historian, it seemed that this is what that correlated the most with.  Due to concerns regarding hypotension when I checked her in clinic, I actually had her continue metoprolol  mg in the morning and asked her to take lisinopril 10 mg at night.  At that time we were also able to get a pacemaker interrogation.  This showed that while she did have some PACs and PVCs, they were not actually symptomatic and there was no arrhythmia to correlate with these symptoms.      She ended up presenting to the hospital the following day feeling unwell.  It turns out that she was taking actually 150 mg of metoprolol in the morning and 10 mg at night.  This was since rectified and she is now back for followup.      Rebecca tells me that since she last saw me, she has not had any further episodes of this \"doom\" feeling.  She has not had any falls or faints.  She feels overall " "that she is doing quite a bit better.      She does note that about half an hour after she takes her medications in the morning, she feels very \"blah\" and feels like she wants to go back to sleep.  She admits to not sleeping well at night, telling me that she gets both to go to the bathroom as well as sometimes to eat.  She sleeps then too much during the day.      She denies any problems with chest pain.  Interestingly, her \"throat blocking\" which prompted Dr. Suresh to order a stress test done in the hospital, has not recurred over the last few days.  She has not had any significant palpitations.  She does note some lower extremity edema, mostly in the feet which seems to improve first thing in the morning and worsens throughout the day.      Echocardiogram done 12/2016 continued to show an EF of 55%-60%.  She has grade 3 diastolic dysfunction, 2+ MR and 2+ TR with moderate to severe pulmonary hypertension.  At that time she was noted to have severe aortic stenosis with a mean gradient of 11 and a small aortic valve.  It was felt that this was more severe despite the gradient because of low cardiac output.   Of note, I have spoken with Dr. Parker's nurse, Carmen, and Dr. Parker has reviewed her echocardiogram.  She feels that her echo.  echocardiogram is more reflective of moderate aortic stenosis for which he has recommended surveillance.      Last stress test done in the hospital 12/09/2016 showed no evidence of ischemia.        ASSESSMENT AND PLAN:     1.  \"Spells.\"  When I last saw her, we discussed switching her metoprolol to the morning and lisinopril at night.  Unfortunately, she mixed up some of her medications and actually was taking 150 mg of metoprolol in the morning and lisinopril 10 mg at night and she felt poorly with this.  She is now taking her medications appropriately but does get some significant fatigue about half an hour after she takes the metoprolol.     At this time.  We will therefore switch her " "metoprolol  mg every morning to 50 mg twice daily.  She will continue lisinopril daily at night.  I am hopeful that this does not cause her any significant fatigue or lightheaded spells.   She will continue to follow this.  Her blood pressure today was under good control and she does have a home health nurse and they can certainly check into things as well.       2.  Recent heart failure with preserved ejection fraction.  She is actually euvolemic, but does have some lower extremity edema noted.  Her weight is stable since I last saw her despite this edema.  At this time, I would not add a diuretic given the fact that I already worry about hypotension, but we will have her continue to monitor this and follow a low-sodium diet.     3.  Paroxysmal atrial fibrillation.  Again, device interrogation had previously shown evidence of atrial fibrillation as well as atrial tachycardia and ectopy.  She had significant lower extremity edema on diltiazem and is now on metoprolol, and it was felt that rapid atrial fibrillation caused her episode of heart failure with preserved ejection fraction.     At this time, we will have her switch her metoprolol to 50 mg twice daily.  She will continue anticoagulation.  We will continue to follow her pacemaker very closely.  Of note, her feelings of \"doom\" and \"lightheadedness\" did not correlate with any atrial arrhythmias     4.  Aortic stenosis.  Echocardiogram done in December showed a mean gradient of 11.  Carmen and Dr. Parker reviewed her echocardiogram and he felt that the stroke volume was just slightly low at 47 with a velocity of 2.09.  Mean gradient as above was 11 and therefore he feels that her AS is just moderate.  He has recommended followup echocardiogram in approximately 6 months.      She will see me in a couple of months just to make sure she continues to do well.  I have encouraged home health to contact us could we be of any assistance.               Again, thank " you for allowing me to participate in the care of your patient.      Sincerely,    Lisbet Chakraborty PA-C     [facility]

## 2017-01-04 NOTE — TELEPHONE ENCOUNTER
"I called [facility] RN Melvin @ [phone]-I left her the message that the timing of metoprolol xl 100mg was changed @ the o/v on 1-3-2017--she will  try taking a split dose-50mg xl bid to see if that helps w/ the fatigue she got 30 min after her AM meds. pts family member came to the appt so we hope the medications are all in order now-I asked Melvin to call me back, left my direct #, and said I could fax that info to her as well, asked her to leave me the  private fax #-when she calls back---await a call back-mmunns lpn~~~I JUST GOT A CALL BACK FROM MELVIN ANDREA, SHE GOT MY INFO ABOVE- AND WILL BE SEEING THE PATIENT ONE MORE VISIT BEFORE CARE TRANSITIONS TO A [facility] DIVISION OF  CALLED \"SENIOR SERVICES\" WHICH IS MORE LIKE ASSISTED LIVING. GENERAL NUMBER /[phone] LPN  "

## 2017-01-10 NOTE — PROGRESS NOTES
[facility] Care Coordination Outreach    Patient was enrolled in [facility] upon discharge from .    Clinical Data:  Patient contacted due to variance follow-up call.    Action: left voicemail.    Plan: RN CC will continue to monitor patients responses. Will plan to outreach as needed and with any new variances or concerns.

## 2017-01-12 NOTE — TELEPHONE ENCOUNTER
Received a call from Roma Wilson RN who is pts Homecare  ( senior services thru ) this was her 1st visit w/ Rebecca. She reports no CHF concerns, wt is 120# no pedal Edema, slight abdominal edema.( unclear to RN what her baseline is)  Minimal SOB, some anxiety. Pt reports she has occasional SOB @ night, she thinks some of it is anxiety.O2 sats 95% today, b/p in the 130/60 range will message Elisabeth beltrán lpn

## 2017-01-12 NOTE — TELEPHONE ENCOUNTER
Great news re: O2 sats and BP and that Rebecca feels like she's doing ok from CV standpoint.    At this time I would make no change  - keep taking lisinopril 10 mg at night and metoprolol XL 50 mg BID.     She'll see me 3/2017 but should call if they need to see her sooner.    Thx!

## 2017-01-13 NOTE — TELEPHONE ENCOUNTER
I spoke w/   ZULLY Wilson today-no changes from Elisabeth mojica @ this time-she does want to make sure pt continues to take the lisinopril @ NOC and metoprolol bid. Roma was asking about a O2 order-I advise she contact PMD re the need for O2, also pt has mentined she feels anxious at times, I do not see any thing med wise on her EMR for anxiety-that may be something they can discuss w/ her PMD. Roma  Will continue to call us w/ Updates-mmunns lpn

## 2017-01-16 NOTE — PROGRESS NOTES
Clinic Care Coordination Contact  [facility] DbAlfredo Care Coordination Outreach    Patient was enrolled in [facility] upon Discharged from: Hospital    Program Type: Heart Failure    Program Length: 30 days post discharge    Clinical Data:  Outreach Type: Variance follow up call    Action: Actions: Other (Comment) (left voicemail )                          Plan: RN CC will continue to monitor patients responses. Will plan to outreach as needed and with any new variances or concerns.

## 2017-01-16 NOTE — TELEPHONE ENCOUNTER
Cristela RN calling Elisabeth cervantes/ homecare update-pts wt is up today @ 128.2, her SOB is better,lungs clear, however she has 2+ bilateral leg swelling and 3+ in her feet, pt ate  A whole can of chicken ala allen (1020mg per serving of NA) this weekend-message to Elisabeth mojica-nic sanchezn

## 2017-01-16 NOTE — TELEPHONE ENCOUNTER
Pt's weight was reportedly 120 on 1/12 ... That's a huge jump (8.2 pounds in 4 days!).  Is that correct???    If her breathing is OK (no orthopnea, PND) and she only has LE edema, I hesitate to add diuretic therapy given that she was so orthostatic in clinic).  If no breathing issues, would have her LIMIT salt, compression stockings, put feet up, etc.    If she has breathing issues (increased SOB, orthopnea, need for O2, etc) would recommend a 2 day course of furosemide 20 mg and then she should come back in in 1 week with BMP. Still needs to limit salt and put feet up.    Thx!!!

## 2017-01-16 NOTE — TELEPHONE ENCOUNTER
"I did call homecare RN back to verify pts recorded wt today-it was 128# as she reported, But-today the wt was done fully clothed, and she usually does not check her wt with the clothes on. Cristela does not believe pt is having breathing issue currently-pt told her she sleep \"really well\" the last 2 nights. Pt did get RX from her PMD for PRN lorazepam , which she has not needed to use yet, she is sometimes anxious at night.pt has a f/u appt w/ PMD on wed. And Cristela will be seeing her again later in the week-nic sanchezn  "

## 2017-01-20 PROBLEM — I50.33 ACUTE ON CHRONIC DIASTOLIC CONGESTIVE HEART FAILURE (H): Status: ACTIVE | Noted: 2017-01-01

## 2017-01-20 NOTE — IP AVS SNAPSHOT
[address], [address]    [facility] [address]    Phone:  [phone]                                       After Visit Summary   1/20/2017    Rebecca Helms    MRN: [id number]           After Visit Summary Signature Page     I have received my discharge instructions, and my questions have been answered. I have discussed any challenges I see with this plan with the nurse or doctor.    ..........................................................................................................................................  Patient/Patient Representative Signature      ..........................................................................................................................................  Patient Representative Print Name and Relationship to Patient    ..................................................               ................................................  Date                                            Time    ..........................................................................................................................................  Reviewed by Signature/Title    ...................................................              ..............................................  Date                                                            Time

## 2017-01-20 NOTE — IP AVS SNAPSHOT
MRN:[id number]                      After Visit Summary   1/20/2017    Rebecca Helms    MRN: [id number]           Thank you!     Thank you for choosing [facility] for your care. Our goal is always to provide you with excellent care. Hearing back from our patients is one way we can continue to improve our services. Please take a few minutes to complete the written survey that you may receive in the mail after you visit with us. Thank you!        Patient Information     Date Of Birth          6/18/1928        About your hospital stay     You were admitted on:  January 20, 2017 You last received care in the:  [facility] [address] [facility]    You were discharged on:  January 24, 2017        Reason for your hospital stay       Congestive Heart Failure                  Who to Call     For medical emergencies, please call 911.  For non-urgent questions about your medical care, please call your primary care provider or clinic, [phone]          Attending Provider     Provider    Anthony Landry MD Rauniyar, Clifton Jerez MD       Primary Care Provider Office Phone # Fax #    Ren Gipson [phone]       [facility] [address]  [address] [place] [address]         When to contact your care team       Call your primary doctor if you have any of the following:  increased shortness of breath,  increased swelling or weight gain of more than 5 lbs in 3 days.                  After Care Instructions     Activity       Your activity upon discharge: activity as tolerated            Diet       Follow this diet upon discharge:   Fluid restriction 1800 ML FLUID  Combination Diet Regular Diet Adult; 2 gm NA Diet            Monitor and record       weight every day                  Follow-up Appointments     Follow-up and recommended labs and tests        Follow up with primary care provider, Ren Gipson, within 3-5 days to evaluate medication change and for  hospital follow- up.  The following labs/tests are recommended: Will need to follow volume status, blood pressure, HR and renal function as her Metoprolol was increased and she was started in a Diuretic for her HF.  BMP to be completed on Friday.  Ensure cardiology follow up for her Aortic Stenosis.   - Cardiology follow up for her Aortic Stenosis.                  Your next 10 appointments already scheduled     Jan 30, 2017  9:00 AM   LAB with [facility]   [facility] ([facility])    [address] [place] [address]   [phone]           Patient must bring picture ID.  Patient should be prepared to give a urine specimen  Please do not eat 10-12 hours before your appointment if you are coming in fasting for labs on lipids, cholesterol, or glucose (sugar).  Pregnant women should follow their Care Team instructions. Water with medications is okay. Do not drink coffee or other fluids.   If you have concerns about taking  your medications, please ask at office or if scheduling via [employer], send a message by clicking on Secure Messaging, Message Your Care Team.            Jan 30, 2017 10:10 AM   CORE Enrollment with Maday Diaz PA-C   [facility] ([facility])    [address] [place], [place] [address]  [facility] [address]   [phone]            Mar 09, 2017 11:30 AM   LAB with [facility]   [facility] ([facility])    [address] [place], [place] [address]  [facility] [address]   [phone]           Patient must bring picture ID.  Patient should be prepared to give a urine specimen  Please do not eat 10-12 hours before your appointment if you are coming in fasting for labs on lipids, cholesterol, or glucose (sugar).  Pregnant women should follow their Care Team instructions. Water with medications is okay. Do not drink coffee or other fluids.   If you have  concerns about taking  your medications, please ask at office or if scheduling via i-nexus, send a message by clicking on Secure Messaging, Message Your Care Team.            Mar 09, 2017 12:30 PM   Return Visit with Lisbet Chakraborty PA-C   [facility] [place] ([facility])    [address]  [place] [id number] [phone]            Mar 09, 2017  2:00 PM   Remote PPM Check with M[facility] TECH1   [facility] [place] ([facility])    [address]  [place] [id number] [phone]           This appointment is for a remote check of your pacemaker.  This is not an appointment at the office.              Additional Services     Home Care OT Referral for Hospital Discharge       OT to eval and treat for lymphedema    Your provider has ordered home care - occupational therapy. If you have not been contacted within 2 days of your discharge please call the department phone number listed on the top of this document.            Home Care PT Referral for Hospital Discharge       PT to eval and treat    Your provider has ordered home care - physical therapy. If you have not been contacted within 2 days of your discharge please call the department phone number listed on the top of this document.            Home care nursing referral       RN skilled nursing visit. RN to assess vital signs and weight, respiratory and cardiac status and patients ability to take and record daily blood pressure, temp and weight.  RN to draw BMP on Friday 1/24/17 and send to pcp unless she is able to have this done in her pcp clinic.      Your provider has ordered home care nursing services. If you have not been contacted within 2 days of your discharge please call the inpatient department phone number at [phone] .                  Future tests that were ordered for you     Compression stockings                 General  "Recommendations To Control Heart Failure When You Get Home     Instructions To Patients and Families: Please read and check off each of these important instructions as you do them when you get home.           Weight and symptoms      ___ Put a scale in your bathroom  ___ Post a weight chart or calendar next to the scale  ___Weigh yourself every day as soon as you you get up in the morning. You should only be wearing your pajamas. Write your weight on the chart/calendar.  ___ Bring your weight chart/calendar with you to all appointments    ___Call your doctor if you gain 2 pounds in 1 day or 5 pounds in 1 week from your \"dry\" weight (baseline weight). Also call your doctor if you have shortness of breath that gets worse over time, leg swelling or fatigue.         Medicines and diet     ___ Make sure to take your medicines as prescribed.    ___Bring a current list of your medicines and all of your medicine bottles with you to all appointments.    ___ Limit fluids if you still have swelling or shortness of breath, or if your doctor tells you to do so.  ___ Eat less than 2000 mg of sodium (salt) every day. Read food labels, and do not add salt to meals.   ___ Heart healthy diet with low fat and low cholesterol          Activity and suggested lifestyle changes    ___ Stay active. Talk to your doctor about an exercise program that is safe for your heart.    ___ Stop smoking. Reduce alcohol use.      ___ Lose weight if you are overweight. Extra weight puts a lot of stress on the heart.          Control for Leg Swelling   ___ Keep your legs elevated (raised) as needed for swelling. If swelling is uncomfortable or elevation doesn t help, ask your doctor about using ACE wrap or Jobst stockings.          Follow-up appointments   ___ Make a [facility] appointment with a basic metabolic panel lab draw 3 to 5 days after you leave the hospital. Call one of the following locations:   [facility] " "[facility]  [phone],  [facility] [phone],  [facility]  [phone].     ___ Make sure to take your medications as prescribed and bring an accurate list of your medications and your weight chart/calendar to your follow up appointment at the [facility].E[facility] Clinic for continued education and adjustments          What is the CORE clinic?    The C.O.R.E (Cardiomyopathy, Optimization, Rehabilitation, Education) Clinic is a heart failure specialty clinic within the [facility]. At C.O.R.E., you will work with nurse practitioners to carefully adjust medicines, get education and learn who and when to call if symptoms appear. C.O.R.E nurses specialize in helping you:    better understand your disease.    slow the progress of your disease.    improve the length and quality of your life.    detect future heart problems before they become life threatening.    avoid hospital stays.            Pending Results     No orders found from 1/19/2017 to 1/21/2017.            Statement of Approval     Ordered          01/24/17 0821  I have reviewed and agree with all the recommendations and orders detailed in this document.   EFFECTIVE NOW     Approved and electronically signed by:  Garret Medina MD             Admission Information        Provider Department Dept Phone    1/20/2017 Clifton Cannon MD [facility] [phone]      Your Vitals Were     Blood Pressure Pulse Temperature    140/73 mmHg 108 97  F (36.1  C) (Oral)    Respirations Height Weight    16 1.549 m (5' 1\") 56.3 kg (124 lb 1.9 oz)    BMI (Body Mass Index) Pulse Oximetry       23.46 kg/m2 95%       MyChart Information     [employer] gives you secure access to your electronic health record. If you see a primary care provider, you can also send messages to your care team and make appointments. If you have questions, please call your primary care clinic.  If you do " not have a primary care provider, please call [phone] and they will assist you.        Care EveryWhere ID     This is your Care EveryWhere ID. This could be used by other organizations to access your [facility] medical records  [id number]           Review of your medicines      START taking        Dose / Directions    furosemide 40 MG tablet   Commonly known as:  LASIX   Used for:  Acute diastolic congestive heart failure (H)        Dose:  40 mg   Take 1 tablet (40 mg) by mouth daily   Quantity:  30 tablet   Refills:  3         CONTINUE these medicines which may have CHANGED, or have new prescriptions. If we are uncertain of the size of tablets/capsules you have at home, strength may be listed as something that might have changed.        Dose / Directions    metoprolol 25 MG 24 hr tablet   Commonly known as:  TOPROL-XL   This may have changed:    - medication strength  - how much to take   Used for:  Acute diastolic congestive heart failure (H), Persistent atrial fibrillation (H)        Dose:  75 mg   Take 3 tablets (75 mg) by mouth 2 times daily   Quantity:  180 tablet   Refills:  3         CONTINUE these medicines which have NOT CHANGED        Dose / Directions    apixaban ANTICOAGULANT 2.5 MG tablet   Commonly known as:  ELIQUIS        Dose:  2.5 mg   Take 2.5 mg by mouth 2 times daily   Quantity:  90 tablet   Refills:  1       brimonidine 0.15 % ophthalmic solution   Commonly known as:  ALPHAGAN-P        Dose:  1 drop   Place 1 drop into both eyes 2 times daily   Refills:  0       calcium-vitamin D 600-400 MG-UNIT per tablet   Commonly known as:  CALTRATE        Dose:  1 tablet   Take 1 tablet by mouth daily   Refills:  0       latanoprost 0.005 % ophthalmic solution   Commonly known as:  XALATAN        Dose:  1 drop   Place 1 drop into both eyes At Bedtime   Refills:  0       lisinopril 10 MG tablet   Commonly known as:  PRINIVIL/ZESTRIL   Used for:  Essential hypertension        Dose:  10 mg   Take 1  tablet (10 mg) by mouth daily at night   Refills:  0       moxifloxacin 0.5 % ophthalmic solution   Commonly known as:  VIGAMOX        Dose:  1 drop   Place 1 drop Into the left eye daily as needed (eye infections)   Refills:  0       Multi-vitamin Tabs tablet        Dose:  1 tablet   Take 1 tablet by mouth daily.   Refills:  0       order for DME   Used for:  Acute on chronic diastolic congestive heart failure (H)        Equipment being ordered: Walker Wheels () and Walker () Treatment Diagnosis: difficulty with gait   Quantity:  1 each   Refills:  0       pravastatin 20 MG tablet   Commonly known as:  PRAVACHOL   Used for:  CAD (coronary artery disease)        Dose:  20 mg   Take 1 tablet (20 mg) by mouth daily   Quantity:  30 tablet   Refills:  11       timolol 0.5 % ophthalmic solution   Commonly known as:  TIMOPTIC        Dose:  1 drop   Place 1 drop into both eyes 2 times daily   Refills:  0       TRAMADOL HCL PO        Dose:  50 mg   Take 50 mg by mouth At Bedtime Patient takes scheduled   Refills:  0       TYLENOL ARTHRITIS PAIN 650 MG CR tablet   Generic drug:  acetaminophen        Dose:  650 mg   Take 650 mg by mouth daily   Refills:  0            Where to get your medicines      These medications were sent to [facility] [id number] [place] [address] [address] [address]    Hours:  24-hours Phone:  [phone]    - furosemide 40 MG tablet  - metoprolol 25 MG 24 hr tablet             Protect others around you: Learn how to safely use, store and throw away your medicines at www.disposemymeds.org.             Medication List: This is a list of all your medications and when to take them. Check marks below indicate your daily home schedule. Keep this list as a reference.      Medications           Morning Afternoon Evening Bedtime As Needed    apixaban ANTICOAGULANT 2.5 MG tablet   Commonly known as:  ELIQUIS   Take 2.5 mg by mouth 2 times  daily   Last time this was given:  2.5 mg on 1/24/2017  8:28 AM                                brimonidine 0.15 % ophthalmic solution   Commonly known as:  ALPHAGAN-P   Place 1 drop into both eyes 2 times daily                                calcium-vitamin D 600-400 MG-UNIT per tablet   Commonly known as:  CALTRATE   Take 1 tablet by mouth daily                                furosemide 40 MG tablet   Commonly known as:  LASIX   Take 1 tablet (40 mg) by mouth daily   Last time this was given:  40 mg on 1/24/2017  8:28 AM                                latanoprost 0.005 % ophthalmic solution   Commonly known as:  XALATAN   Place 1 drop into both eyes At Bedtime   Last time this was given:  1 drop on 1/23/2017  9:24 PM                                lisinopril 10 MG tablet   Commonly known as:  PRINIVIL/ZESTRIL   Take 1 tablet (10 mg) by mouth daily at night   Last time this was given:  10 mg on 1/24/2017  8:28 AM                                metoprolol 25 MG 24 hr tablet   Commonly known as:  TOPROL-XL   Take 3 tablets (75 mg) by mouth 2 times daily   Last time this was given:  1/24/2017  8:28 AM                                moxifloxacin 0.5 % ophthalmic solution   Commonly known as:  VIGAMOX   Place 1 drop Into the left eye daily as needed (eye infections)                                Multi-vitamin Tabs tablet   Take 1 tablet by mouth daily.                                order for DME   Equipment being ordered: Walker Wheels () and Walker () Treatment Diagnosis: difficulty with gait                                pravastatin 20 MG tablet   Commonly known as:  PRAVACHOL   Take 1 tablet (20 mg) by mouth daily   Last time this was given:  20 mg on 1/24/2017  8:29 AM                                timolol 0.5 % ophthalmic solution   Commonly known as:  TIMOPTIC   Place 1 drop into both eyes 2 times daily   Last time this was given:  1 drop on 1/23/2017  9:24 PM                                TRAMADOL  HCL PO   Take 50 mg by mouth At Bedtime Patient takes scheduled   Last time this was given:  50 mg on 1/23/2017  9:24 PM                                TYLENOL ARTHRITIS PAIN 650 MG CR tablet   Take 650 mg by mouth daily   Generic drug:  acetaminophen

## 2017-01-21 PROBLEM — I50.9 ACUTE EXACERBATION OF CHF (CONGESTIVE HEART FAILURE) (H): Status: ACTIVE | Noted: 2017-01-01

## 2017-01-21 NOTE — H&P
PRIMARY CARE PHYSICIAN:  Ren Gipson MD      CHIEF COMPLAINT:  Shortness of breath, leg swelling, weight gain.      HISTORY OF PRESENT ILLNESS:  Ms. Rebecca Helms is a very pleasant 88-year-old female with a past medical history significant for nonobstructive CAD, left carotid stenosis, hypertension, dyslipidemia, glaucoma, moderate MR, TR, moderate to severe aortic stenosis and pulmonary hypertension, grade 3 diastolic dysfunction, atrial fibrillation who presented to ER today with complaints of shortness of breath.  She was recently in the hospital early December for CHF exacerbation precipitated by a-fib with RVR.  Echo at that time showed EF of 55-60% and grade 3 diastolic dysfunction.  She was diuresed with IV diuretics but was not discharged on any home diuretics.  She was being followed at Cardiology Clinic and was not started on any p.o. diuretics because of orthostatic hypotension.  In past 1 week, she has gained about 10 pounds with leg swelling but did not have any shortness of breath; however, for past 2 days, she has been having some dyspnea on exertion, gets very short winded on minimal exertion and thus presented to ER for further evaluation.  Here in the ER, she was seen by Dr. Landry.  She was noted to desaturate to 80s on exertion.  Clinical exam and chest x-ray was consistent with fluid overload.  She was given 40 of IV Lasix and hospitalist was requested admission for further evaluation.  She denies any fever, chills or rigors.  No chest pain.  Denies pain in abdomen.  Reports normal bowel and bladder habits.      REVIEW OF SYSTEMS:  A 10-point review of systems was done and was negative apart from those mentioned in the history of present illness.      PAST MEDICAL HISTORY:   1.  Atrial fibrillation on apixaban.   2.  Sinus node dysfunction, status post pacemaker.   3.  Hypertension.   4.  Dyslipidemia.   5.  Diastolic CHF, last echo from 12/16/2016 with EF of 55%-60%, grade 3 diastolic  dysfunction.   6.  Moderate MR and TR.   7.  Moderate to severe aortic stenosis.   8.  Pulmonary hypertension, moderate to severe.      MEDICATIONS PRIOR TO ADMISSION:  This needs to be verified by the pharmacy, but she seems to be on Toprol-XL, lisinopril, moxifloxacin eyedrops, apixaban, brimonidine eyedrops, pravastatin, calcium, vitamin D, Latanoprost eyedrops, Timolol eyedrops, tramadol, multivitamin.      ALLERGIES:  No known drug allergies.      SOCIAL HISTORY:  She denies smoking.  Takes alcohol on rare occasions.  Denies any illicit drug use.      FAMILY HISTORY:  Reviewed and not pertinent to current presentation.      PHYSICAL EXAMINATION:   GENERAL:  The patient is conscious, alert, oriented x3, very pleasant, lying comfortably in bed in no apparent distress.   VITAL SIGNS:  Temperature 98.5, heart rate of 80, blood pressure of 157/94, saturation 100% on room air.   HEENT:  Pupils are equal and reactive to light and accommodation.  Extraocular movements are intact.  Oral mucosa is moist.   NECK:  Supple, no raised JVD.   RESPIRATORY SYSTEM:  She has bilateral decreased breath sounds at bases, did not appreciate significant wheezing or crepitations.   CARDIOVASCULAR:  Normal S1, S2, irregular rate, irregular rhythm, no murmur.  Pacemaker in place.   ABDOMEN:  Soft, nontender, nondistended, no guarding, rigidity or rebound tenderness.   LOWER EXTREMITIES:  With marked bilateral edema up to the knees, pitting.   NEUROLOGIC:  No focal neurological deficits noted.  Cranial nerves II-XII grossly intact.   PSYCHIATRIC:  Normal mood and affect.      LABORATORY DATA AND IMAGING:  CMP with BUN 21, creatinine of 1.27.  BNP 3778.  A CBC with a WBC of 9.3, hemoglobin 13.7, platelets 178.    Chest x-ray reviewed by me shows bilateral pleural effusions which seems to be new.  No suggestion of infiltrate or pneumothorax.      EKG reviewed by me shows atrial fibrillation with occasional V-paced rhythm.      ASSESSMENT  AND PLAN:  Ms. Rebecca Kemp is an 88-year-old female with past medical history significant for atrial fibrillation, pacemaker status, hypertension, dyslipidemia, and diastolic congestive heart failure, mitral regurgitation, tricuspid regurgitation, pulmonary hypertension, aortic stenosis who presented to ER today with complaints of dyspnea on exertion, weight gain.     1.  Acute diastolic congestive heart failure exacerbation with hypoxia on exertion.  Will admit her as inpatient.  Echo as noted above with grade 3 diastolic dysfunction and has not been on oral diuretics.  She is clearly volume overloaded.  She got 1 dose of 40 mg IV Lasix in the ER.  Will start her on Lasix 20 mg IV b.i.d. starting tomorrow.  Will consult Cardiology for further management.  Will have her on low-salt diet and will restrict fluid intake to 1800 mL per 24 hours.  Will resume her Toprol-XL and lisinopril when verified by pharmacy.     2.  Atrial fibrillation, rate controlled.  Monitor on telemetry.  Resume apixaban when verified by pharmacy.   3.  Hypertension.  Will resume her Toprol-XL and lisinopril when verified by pharmacy.   4.  Dyslipidemia:  Resume pravastatin when verified.   5.  Valvular abnormalities including moderate mitral regurgitation, tricuspid regurgitation, moderate to severe aortic stenosis.  She follows up with Cardiology.   6.  Deep venous thrombosis prophylaxis:  She will be initiated on her PTA apixaban when verified by pharmacy for her atrial fibrillation.   7.  Chronic kidney disease.  Creatinine seems to be stable around her baseline of 1.2.  Will monitor BMP with IV diuresis.      CODE STATUS:  DNR/DNI.         MU HU MD             D: 2017 23:46   T: 2017 00:22   MT: TD      Name:     REBECCA KEMP   MRN:      [id number]        Account:      [id number]   :      1928           Admitted:     [id number]      Document: [id number]       cc: Ren Gipson MD

## 2017-01-21 NOTE — CONSULTS
REASON FOR ASSESSMENT:  CHF Consult for 2 gm NA Diet Education    NUTRITION HISTORY:  Rebecca reports to live in a living facility with a restaurant. She generally eats meals there on Saturday and Sunday, and prepares her own foods the rest of the week. Pt unable to provide a lot of specific information regarding normal intakes, however she did indicate that most of what she eats is processed canned or frozen meals.   - She does look at labels and appears to have good knowledge of high vs. Low Na foods.   - She states that the restaurant at her facility does not accommodate for special diets.   - She has never followed a low Na diet before, but understands the importance of watching sodium to reduce fluid accumulation.     CURRENT DIET:  2 g Na  Fluid restriction 1800mL    NUTRITION DIAGNOSIS:  Food- and nutrition-related knowledge deficit R/t no prior low Na ed AEB pt does not follow low Na diet.       INTERVENTIONS:    Nutrition Prescription:  Low Na diet    Implementation:    Nutrition Education (Content):  a) Provided handouts:  1) Tips for Low Na Diet  b) Discussed rational for limiting Na for CHF and stressed importance of following 2 gm Na guidelines     Nutrition Education (Application):  a) Discussed current eating habits and recommended alternative food choices    Anticipated good compliance    Diet Education - refer to Education Flowsheet    Goals:    Patient verbalizes understanding of diet     All of the above goals met during the education session    Follow Up:    Provided RD contact information for future questions.    Recommend Out-Patient Nutrition Referral, if further diet instructions are needed.    Dorota Swanson, RD, LD

## 2017-01-21 NOTE — PROVIDER NOTIFICATION
MD Notification    Notified Person:  MD    Notified Persons Name: Clifton Cannon MD    Notification Date/Time: 0350    Notification Interaction:  Talked with Physician    Purpose of Notification: Pt in a-fib -130's    Orders Received: Diltiazem 10 mg iv once

## 2017-01-21 NOTE — PLAN OF CARE
Problem: Goal Outcome Summary  Goal: Goal Outcome Summary  PT/Lymphedema: Evaluations completed, treatments initiated. 89 yo female adm with SOB, increased weight gain and increased LE swelling. Pt reports she has not been moving well for the last 2 weeks. She resides in a Senior apt/[facility] and states she has been having some home care services including nursing and PT. She walks with a cane or a walker, sues public transport for appointments and grocery shopping prn. She has had 2-3 falls in the last 6 months. Pt presents to PT/Lymph with increased edema, 3-4+ pitting in the dorsum of the R foot/ankle, lesser pitting on the L and up into the thighs B. Pt appropriate for a quick wrap style placement of gradient compression bandages (not ace wraps). Pt agreeable, wraps placed, reviewed signs/symptoms of intolerance. Updated white board with pt functional status as well as return of lymph/PT. Recommend B LEs stay wrapped until 2pm tomorrow, wraps should be removed, LEs washed, skin inspected and lotion applied prior to PT arrival. Pt was able to ambulate ~ 120' with WW and CGA, completed bed mobility and sit<>stand transfers with supervision. Anticipate return to apt with continued home care services RN, PT, Lymphedema.

## 2017-01-21 NOTE — PROGRESS NOTES
[facility]    Hospitalist Progress Note    Date of Service (when I saw the patient): 01/21/2017    Assessment and Plan  Rebecca Helms is an 88-year-old female with past medical history significant for atrial fibrillation, pacemaker status, hypertension, dyslipidemia, and diastolic congestive heart failure, mitral regurgitation, tricuspid regurgitation, pulmonary hypertension, aortic stenosis who presented to ER with complaints of dyspnea on exertion and weight gain.      Acute diastolic congestive heart failure exacerbation with hypoxia on exertion  Previous Echo 12/2016 with Ef 55-60%, grade 3 diastolic dysfunction and has not been on oral diuretics.  She is clearly volume overloaded on admission.  She recieved 1 dose of 40 mg IV Lasix in the ER.    - Started her on Lasix 20 mg IV b.i.d 1/21.   - Negative > 1400 cc since admit.   - Follow output and daily weights.   - Cardiology consulted for further management.   - Low-salt diet and will restrict fluid intake to 1800 mL per 24 hours.    - Resumed her Toprol-XL and lisinopril.   - CORE clinic eval and nutrition consult for diet education.       Atrial fibrillation  Rate controlled initially but did have RVR after admission with hr's 120-130's.  - C/w Tele.  Presently 100's.     - C/w pta apixaban and Toprol-XL.  - Lopressor iv prn.        Hypertension.    - Continued on her pta Toprol-XL and lisinopril.     Dyslipidemia:    Resume pravastatin.       Valvular abnormalities   Including moderate mitral regurgitation, tricuspid regurgitation, moderate to severe aortic stenosis.   - She follows up with Cardiology.          Chronic kidney disease  Creatinine seems to be stable around her baseline of 1.2.    - Will monitor BMP with IV diuresis.        DVT Prophylaxis: Apixaban  Code Status: DNR/DNI    Disposition:  Pending clinical improvement.  Still with orthopnea and needs ongoing diuresis.        Garret Medina       Interval History  No acute  overnight events reported.  Vitals stable. Afebrile. HR's 120-130 early am that responded to 10 mg iv dilt. Saturating well on RA at rest.     -Data reviewed today: I reviewed all new labs and imaging results over the last 24 hours. I personally reviewed no images or EKG's today.    Physical Exam  Temp: 97.6  F (36.4  C) Temp src: Oral BP: 130/80 mmHg Pulse: 80 Heart Rate: 125 Resp: 18 SpO2: 93 % O2 Device: None (Room air)    Filed Vitals:    01/20/17 2203 01/21/17 0029   Weight: 56.7 kg (125 lb) 58.1 kg (128 lb 1.4 oz)     Vital Signs with Ranges  Temp:  [96.6  F (35.9  C)-98.5  F (36.9  C)] 97.6  F (36.4  C)  Pulse:  [80-83] 80  Heart Rate:  [] 125  Resp:  [18] 18  BP: (130-173)/() 130/80 mmHg  SpO2:  [93 %-100 %] 93 %  I/O last 3 completed shifts:  In: 120 [P.O.:120]  Out: 1500 [Urine:1500]    Gen: Patient in no acute distress.  Appears comfortable.  Heart:  S1S2+, A-fib, systolic murmur.   Lungs:  Clear to auscultation at apices, no wheezing, decreased with rales at bases.   Abdomen:  Soft, non tender, non distended, bowel sounds positive.  Extremities:  3-4+ pitting edema in b/l LE's.     Medications    - MEDICATION INSTRUCTIONS -         furosemide  20 mg Intravenous BID     apixaban ANTICOAGULANT  2.5 mg Oral BID     brimonidine  1 drop Both Eyes BID     latanoprost  1 drop Both Eyes At Bedtime     [START ON 1/22/2017] lisinopril  10 mg Oral Daily     metoprolol  50 mg Oral BID     pravastatin  20 mg Oral Daily     timolol  1 drop Both Eyes BID     traMADol (ULTRAM) tablet 50 mg  50 mg Oral At Bedtime       Data    Recent Labs  Lab 01/20/17  2221   WBC 9.3   HGB 13.7   MCV 99         POTASSIUM 4.5   CHLORIDE 102   CO2 24   BUN 21   CR 1.27*   ANIONGAP 9   CARLO 9.2   *   ALBUMIN 3.6   PROTTOTAL 6.9   BILITOTAL 0.6   ALKPHOS 98   ALT 44   AST 44       Recent Results (from the past 24 hour(s))   XR Chest 2 Views    Narrative    CHEST TWO VIEWS  1/20/2017 10:35 PM     COMPARISON:  Frontal chest x-ray 12/8/2016.    HISTORY: Shortness of breath.      Impression    IMPRESSION: A dual-lead pacemaker module implanted over the left chest  with the leads in the right atrium and right ventricle is again noted  without identifiable change.    There is no severe cardiomegaly. There are new bilateral pleural  effusions. There are no definite airspace opacities to suggest  pneumonia. There is no pneumothorax.    WILFRID HARGROVE MD

## 2017-01-21 NOTE — PLAN OF CARE
Problem: Goal Outcome Summary  Goal: Goal Outcome Summary  Outcome: No Change  Patient A&Ox4.  On tele in A-fib 's-130's. Diltiazem 10 mg IV given x1. HR  MD notified. HR down to . HTN. Denies pain. Voiding frequently d/t IV lasix. Adequate output. A1. TREVINO. 2 gm NA diet, 1800 ml FR. Will continue to monitor

## 2017-01-21 NOTE — PHARMACY-ADMISSION MEDICATION HISTORY
Admission medication history interview status for the 1/20/2017  admission is complete. See EPIC admission navigator for prior to admission medications     Medication history source reliability:Good    Actions taken by pharmacist (provider contacted, etc):None     Additional medication history information not noted on PTA med list : Not sure if she took the evening doses of some of her meds    Medication reconciliation/reorder completed by provider prior to medication history? No    Time spent in this activity: 10 minutes    Prior to Admission medications    Medication Sig Last Dose Taking? Auth Provider   acetaminophen (TYLENOL ARTHRITIS PAIN) 650 MG CR tablet Take 650 mg by mouth daily 1/20/2017 at Unknown time Yes Unknown, Entered By History   metoprolol (TOPROL-XL) 50 MG 24 hr tablet Take 1 tablet (50 mg) by mouth 2 times daily 1/20/2017 at am Yes Lisbet Chakraborty PA-C   lisinopril (PRINIVIL/ZESTRIL) 10 MG tablet Take 1 tablet (10 mg) by mouth daily at night 1/19/2017 at hs Yes Lisbet Chakraborty PA-C   moxifloxacin (VIGAMOX) 0.5 % ophthalmic solution Place 1 drop Into the left eye daily as needed (eye infections)   Yes Unknown, Entered By History   apixaban ANTICOAGULANT (ELIQUIS) 2.5 MG tablet Take 2.5 mg by mouth 2 times daily  1/20/2017 at pm Yes Marissa Warren MD   brimonidine (ALPHAGAN-P) 0.15 % ophthalmic solution Place 1 drop into both eyes 2 times daily  1/20/2017 at am Yes Reported, Patient   pravastatin (PRAVACHOL) 20 MG tablet Take 1 tablet (20 mg) by mouth daily 1/20/2017 at Unknown time Yes Praful Monae MD   calcium-vitamin D (CALTRATE) 600-400 MG-UNIT per tablet Take 1 tablet by mouth daily  1/20/2017 at Unknown time Yes Unknown, Entered By History   latanoprost (XALATAN) 0.005 % ophthalmic solution Place 1 drop into both eyes At Bedtime 1/20/2017 at 2200 Yes Unknown, Entered By History   timolol (TIMOPTIC) 0.5 % ophthalmic solution Place 1 drop into both eyes 2 times daily  1/20/2017 at am Yes Unknown, Entered By History   TRAMADOL HCL PO Take 50 mg by mouth At Bedtime Patient takes scheduled 1/19/2017 at hs Yes Unknown, Entered By History   multivitamin, therapeutic with minerals (MULTI-VITAMIN) TABS Take 1 tablet by mouth daily. 1/20/2017 at Unknown time Yes Reported, Patient

## 2017-01-21 NOTE — CONSULTS
CARDIOVASCULAR CONSULTATION      DATE OF CONSULTATION:  01/21/2017 at noon.      CHIEF COMPLAINT:  Acute on chronic diastolic heart failure and atrial fibrillation with rapid ventricular response.      REQUESTING PHYSICIAN:  Dr. Cannon.        HISTORY OF PRESENT ILLNESS:  I had the pleasure of evaluating Ms. Rebecca Helms for acute on chronic diastolic heart failure and for atrial fibrillation and rapid ventricular response.  She is a pleasant 88-year-old woman who I had previously seen in consultation 12/11/2016.  She carries a past medical history notable for paroxysmal atrial fibrillation on therapeutic anticoagulation, sinus node dysfunction with history of syncope, status post permanent pacemaker implantation on 09/2014, hypertension, dyslipidemia, carotid artery stenosis, and moderate to severe aortic valve stenosis.      Ms. Helms reports that over the past several days she has been noting increasing lower extremity edema, an 8-pound weight gain, exertional dyspnea and orthopnea.  In reviewing the records, it appears that Ms. Helms has been followed in the outpatient setting and had not been on a standing loop diuretic due to lightheadedness and near-syncope.  She called on 01/16 reporting 8-pound weight gain.  Her prior outpatient weight was 120 pounds and at that time, she was only 128 pounds.   It appears that the culprit was a dietary indiscretion and the patient was eating high sodium canned chicken.  She was recommended 20 p.o. furosemide for 2 days.  The patient states that her edema and weight gain persisted despite this.      In speaking with the patient, it seems as if she has a difficult time adhering to dietary limitations.  She is , prepared  cuisine, which is likely high sodium in addition to some crab dip over the past several days.  She notes that she has intermittent chest tightness that is atypical in nature and nonexertional.  She recently underwent a stress study for this which  was negative for ischemia.  She reports she is adherent with all of her medications.      She states since she has been in the hospital, her edema has improved to some degree.  However, she is still having orthopnea and she states she needs to set it about 60 degrees to not feel short of breath.  In reviewing her prior records, they are notable for transthoracic echocardiogram obtained about a month ago which showed a preserved LV ejection fraction of 55% to 60%.  There is moderate mitral and tricuspid valve regurgitation.  This study also noted moderate to severe aortic stenosis.  The aortic valve area was 0.83 cm2, the gradient across the valve was only 11.  On further review, it looks like the stroke volume index was very low at 29 and the dimensionless index was nearing severe at 0.28.  The RVSP was also elevated at 60 mmHg.      REVIEW OF SYSTEMS:  A 12-point review of systems was performed but negative unless mentioned in the HPI.      PAST MEDICAL HISTORY:   1.  Diastolic heart failure.   2.  Moderate to severe aortic valve regurgitation.   3.  Hypertension.   4.  Dyslipidemia.   5.  Sinus node dysfunction, status post permanent pacemaker implantation.   6.  Carotid artery stenosis.   7.  Paroxysmal atrial fibrillation on therapeutic anticoagulation.   8.  Mild to moderate coronary artery disease based on outside coronary angiography in 2009.      MEDICATIONS:   1.  Prior to admission include Metoprolol XL 50 mg b.i.d.   2.  Lisinopril 10 mg daily.   3.  Pravastatin 20 mg daily.      SOCIAL HISTORY:  The patient is a lifelong nonsmoker and denies drug and alcohol abuse.      FAMILY HISTORY:  Reviewed and there is no pertinent family history.      PHYSICAL EXAMINATION:     VITAL SIGNS:  Temperature 97.6, pulse 120, respiratory rate 18, blood pressure 130/80, oxygen saturation 93% on room air.   GENERAL:  The patient awake, alert, conversant and pleasant.   HEENT:  No scleral icterus.   NECK:  Elevated jugular  venous pressure to 15-20 cm of water.   LUNGS:  Relatively clear with diminished basilar breath sounds with dullness to percussion.   CARDIOVASCULAR:  Regularly irregular S1, diminished A2 component of the second heart sound and a prominent P2.  There is a 3/6 late peaking systolic ejection murmur that radiates to the carotids.  There is also a holosystolic murmur at the left sternal border which increases with inspiration and a faint holosystolic murmur best heard at the apex.  Carotid upstrokes are bit diminished +1 parvus with relatively normal timing.   ABDOMEN:  Nontender, nondistended with positive bowel sounds.   SKIN:  Warm, dry and intact with some ecchymosis.   EXTREMITIES:  Warm, erythematous, with 2-3+ pitting lower extremity edema.   NEUROPSYCHIATRIC:  The patient is awake, alert, oriented x3 and pleasant mood.        IMPRESSION:   1.  Acute on chronic diastolic heart failure.   2.  Moderate to severe aortic valve stenosis.   3.  Atrial fibrillation with rapid ventricular response.   4.  Hypertension.   5.  Carotid artery stenosis.      I had the pleasure of evaluating Ms. Rebecca Helms for acute on chronic diastolic heart failure.  It appears that she has been having issues with dietary compliance, which is the likely culprit in this situation.  Her dry weight is approximately 120 pounds and she weighs 128 pounds  today.  She had received 40 mg of IV push furosemide in the ED and then was started on 20 mg IV push.  It seems she had a good response with 40 IV push, and so I increased her diuretic dose of 40 IV b.i.d.      There is certainly a reversible restrictive physiology on her most recent transthoracic echocardiogram but my concern is also in regards to the aortic valve disease.  On exam, her findings are consistent with severe aortic valve stenosis.  The echo findings are notable for a small calculated aortic valve area with a low gradient.  This may be a normal ejection fraction, low flow, low  gradient aortic stenosis as her stroke volume index is only 29.  Her dimensionless index is borderline for severe.  I wonder if this is also playing a role in her symptomatology and if she is maintaining her fluid balance.  This can be reassessed in the outpatient setting.  It appears this has been discussed and the providers did not believe that her aortic valve stenosis was severe enough for intervention.      The patient is currently in atrial fibrillation with rapid ventricular response.  She has had issue  tolerating chronotropic medications or antihypertensives in the outpatient setting due to presyncope.  However, while in the hospital, I think it would be worthwhile to try to control her ventricular rates.  I have increased her metoprolol to 75 mg b.i.d.  We can up titrate this further if need be, but again she may not be able to tolerate this in the outpatient setting.  She should be maintained on her current dose of Eliquis.      Thank you for involving me in Ms. Kemp's care.  It was a true pleasure and we will continue to follow along.         PABLO GARCIA MD             D: 2017 12:24   T: 2017 14:21   MT: HECTOR      Name:     ANTHONY KEMP   MRN:      [id number]        Account:       [id number]   :      1928           Consult Date:  2017      Document: [id number]

## 2017-01-21 NOTE — ED NOTES
Bed: ED05  Expected date: 1/20/17  Expected time: 9:53 PM  Means of arrival: Ambulance  Comments:  Stacey 2 88 f /SOB

## 2017-01-21 NOTE — ED NOTES
"[facility]  ED Nurse Handoff Report    ED Chief complaint: Shortness of Breath      ED Diagnosis:   Final diagnoses:   None       Code Status: DNR    Allergies: No Known Allergies    Activity level:  Stand with Assist     Needed?: No    Isolation: No  Infection: Not Applicable    Bariatric?: No      Vital Signs:   Filed Vitals:    01/20/17 2200 01/20/17 2202 01/20/17 2203   BP:  157/94 157/94   Pulse:  83 80   Temp:   98.5  F (36.9  C)   TempSrc:   Temporal   Resp:   18   Height:   1.549 m (5' 1\")   Weight:   56.7 kg (125 lb)   SpO2: 98% 97%        Cardiac Rhythm: ,        Pain level:      Is this patient confused?: No    Patient Report: Initial Complaint: Shortness of breath  Focused Assessment: patient lives alone at an apartment alone, tonight called apartment staff with increasing shortness of breath, reports a week long dry cough with leg swelling. +3 bilateral LE edema which is new from baseline. Patient also reported significant weight gain over last few days. History of A fib with pacer placed.   Tests Performed: labs, xray,  Abnormal Results: Cr, BNP, CXR  Treatments provided: 40 mg lasix IV    Family Comments: no family at bedside    OBS brochure/video discussed/provided to patient: N/A    ED Medications: Medications - No data to display    Drips infusing?:  No      ED NURSE PHONE NUMBER: [phone]           "

## 2017-01-21 NOTE — ED NOTES
Pt walked with handhold 40 ft.  O2 went as low as 82% during the middle of walk, and once the patient sat back down, O2 went to 90%.  Pt gait was very slow and shuffling but steady and confident.

## 2017-01-21 NOTE — PROGRESS NOTES
" 01/21/17 1523   Quick Adds   Type of Visit Initial PT Evaluation   Living Environment   Lives With alone   Living Arrangements condominium   Home Accessibility no concerns   Number of Stairs to Enter Home 0   Number of Stairs Within Home 0   Transportation Available public transportation  (pt rides the bus, metro mobility, taxi)   Self-Care   Dominant Hand right   Usual Activity Tolerance moderate   Current Activity Tolerance fair   Regular Exercise no   Equipment Currently Used at Home cane, straight;grab bar;shower chair   Activity/Exercise/Self-Care Comment Cooks or eats at the restaruant 50/50; manages meds on her own- \"I was in real good shape until a month ago.\" Has call \"pulls\" in her apt, \"I used it to come here.\"   Functional Level Prior   Ambulation 1-->assistive equipment   Transferring 1-->assistive equipment   Toileting 1-->assistive equipment   Bathing 1-->assistive equipment   Dressing 0-->independent   Eating 0-->independent   Communication 0-->understands/communicates without difficulty   Fall history within last six months yes   Number of times patient has fallen within last six months 2   Which of the above functional risks had a recent onset or change? ambulation;transferring;toileting;bathing;dressing;eating;fall history   Prior Functional Level Comment States as of late it takes an \"hour to get my stockings on.\" Reports after last admit she had home care services. RN still \"comes around,\" but PT has stopped.    General Information   Onset of Illness/Injury or Date of Surgery - Date 01/20/17   Referring Physician Clifton Cannon MD   Patient/Family Goals Statement to return to her apt    Pertinent History of Current Problem (include personal factors and/or comorbidities that impact the POC) 87 yo female adm with c/o weakness and SOB. Found to have Acute on chronic diastolic heart failure and atrial fibrillation with rapid ventricular response.   Precautions/Limitations fall precautions " "  General Observations supine in bed, agreeable to PT evalation   Cognitive Status Examination   Orientation orientation to person, place and time   Level of Consciousness alert   Follows Commands and Answers Questions 100% of the time;able to follow multistep instructions   Personal Safety and Judgment intact   Cognitive Comment Pt very talkative, enjoys company   Pain Assessment   Patient Currently in Pain No   Integumentary/Edema   Integumentary/Edema Comments See lymph assessment. Incraesed edema throughout, R LE >L, 4+ pitting dorsum of the feet. Note R heel with red blanchable spot-dime size posterior calcaneus.    Posture    Posture Kyphosis   Range of Motion (ROM)   ROM Comment B LEs WFL as obs via AROM, trunk limited in rotation likely arthritic/posture related. B UEs WFL.   Strength   Strength Comments Demonstrates antigravity strength with functional mobility, fatigues quickly with a chronic dry \"CHF\" sounding cough.   Bed Mobility   Bed Mobility Comments Sup>sit supervision for blanket management/safety.   Transfer Skills   Transfer Comments SBA sit>stand, pt somewhat impulsive, needs cues to wait for mobility   Gait   Gait Comments Pt ambulated in room x 15' with WW and CGA at gait belt, short shuffling steps, narroow NIDHI, trunk flexion and max cervical extension. See flow sheet for treatment.   Balance   Balance Comments Sitting balance fair +, no acute LOB, uses UEs to stabilize with mild purtrubations, standing balance static wtih UE support B fair +, tolerates mild purturbations.   Sensory Examination   Sensory Perception Comments Reports neuropathy B LEs L foot >r; however, intact to light touch.   Muscle Tone   Muscle Tone Comments General muscle atrophy noted, pt with decreased definitino, likely age, nutrition and disuse related.   General Therapy Interventions   Planned Therapy Interventions balance training;bed mobility training;gait training;neuromuscular " "re-education;ROM;strengthening;stretching;transfer training;progressive activity/exercise   Clinical Impression   Criteria for Skilled Therapeutic Intervention yes, treatment indicated   PT Diagnosis Impaired Gait   Influenced by the following impairments Fatigue, SOB, generalized weakness, decreaed balance   Functional limitations due to impairments Decreased functional independence   Clinical Presentation Stable/Uncomplicated   Clinical Presentation Rationale PLOF, co-morbidities, current level of function, clinical judgement and obserfabtion   Clinical Decision Making (Complexity) Low complexity   Therapy Frequency` daily   Predicted Duration of Therapy Intervention (days/wks) 4 days   Anticipated Discharge Disposition Home with Home Therapy   Risk & Benefits of therapy have been explained Yes   Patient, Family & other staff in agreement with plan of care Yes   1x Eval Only-Outpatient/Observation Medicare G-Code   G-code Mobility: Walking & Moving Around   [facility] [employer] TM \"6 Clicks\"   2016, Trustees of [facility], under license to [employer].  All rights reserved.   6 Clicks Short Forms Basic Mobility Inpatient Short Form   [facility] AM-PAC  \"6 Clicks\" V.2 Basic Mobility Inpatient Short Form   1. Turning from your back to your side while in a flat bed without using bedrails? 3 - A Little   2. Moving from lying on your back to sitting on the side of a flat bed without using bedrails? 3 - A Little   3. Moving to and from a bed to a chair (including a wheelchair)? 3 - A Little   4. Standing up from a chair using your arms (e.g., wheelchair, or bedside chair)? 3 - A Little   5. To walk in hospital room? 3 - A Little   6. Climbing 3-5 steps with a railing? 3 - A Little   Basic Mobility Raw Score (Score out of 24.Lower scores equate to lower levels of function) 18   Total Evaluation Time   Total Evaluation Time (Minutes) 10     "

## 2017-01-21 NOTE — PROGRESS NOTES
"   01/21/17 1600   General Information   Discipline PT   Onset of Edema 01/07/17   Affected Body Part(s) Left LE;Right LE;Right UE;Left UE   Edema Etiology Insidious   Etiology Comments (Diastolic CHF)   Pertinent history of current problem (PT: include personal factors and/or comorbidities that impact the POC; OT: include additional occupational profile info) 87 yo female adm with c/o weakness and SOB, weight gain and increased B LE swelling.  Found to have Acute on chronic diastolic heart failure and atrial fibrillation with rapid ventricular response.   Edema Precautions Cardiac Edema/CHF   Pain   Patient currently in pain No   Pain comments B LEs are slightly tender to the touch, denies pain or aibility to rate.   Edema Examination / Assessment   Skin Condition Pitting;Dryness  (Tight shiny; Pitting 3+-4+ over the dorsum of the feet)   Skin Condition Comments L heel with dime size red spot, blanchable, posterior calcaneous.  Lateral R dorsum of the foot with \"scratches\" present. Pt states her feet were \"itchy.\"  L foot plantar surface a thick calus is present, dime size at the center of the met heads.  B heels dry and thick with skin. Corns on most toes. Some wrinkles present toward the proximal end of the lower leg, pt states legs have somewhat reduced with diuretics.   Capillary Refill Symmetrical   Dorsal Pedal Pulse Decreased   Stemmer Sign Positive   Stemmer Sign Comments Note R foot and ankle >in size vs the L   ROM   Range of Motion (WFL) (See PT eval)   Strength   Strength (WFL) (See PT eval)   Sensation   Sensation (WFL) other (describe)   Description of Sensation Deficits Pt reports neuropathy in B feet, L >R. Light touch intact B in B LEs.   Assessment/Plan   Patient presents with Stage 1 Lymphedema;Edema   Assessment Pt will benefit from quick wrap style placmeent of short stretch bandages to assist with volume reduction and improved comfort and circulaiton of B LEs. Pt in agreement with plan for " trial.   Clinical Presentation Stable/Uncomplicated   Clinical Presentation Rationale Clinical judgement, observation, current function, PLOF   Clinical Decision Making (Complexity) Low complexity   Planned Edema Interventions Manual lymph drainage;Gradient compression bandaging;Exercises;Education;Manual therapy   Treatment Frequency daily   Treatment Duration 3 days for edema treatment; then turn over to nursing   Patient, Family and/or Staff in agreement with plan of care. Yes   Risks and benefits of treatment have been explained. Yes   Total Evaluation Time   Total Evaluation Time (Minutes) 10  (Not billed. )

## 2017-01-22 NOTE — PROGRESS NOTES
[facility]    Cardiology Progress Note    Date of Service (when I saw the patient): 01/22/2017     Assessment and Plan  Rebecca Helms is a 88 year old female who carries a PMH of paroxysmal atrial fibrillation on therapeutic anticoagulation, sinus node dysfunction with history of syncope, status post permanent pacemaker implantation on 09/2014, hypertension, dyslipidemia, carotid artery stenosis, and moderate to severe aortic valve stenosis was admitted on 1/20/2017 with acute on chronic diastolic heart failure and atrial fibrillation with rapid ventricular response.    RECOMMENDATIONS:  1.  Acute on chronic diastolic heart failure - dry weight (115-120 lbs). Weight down 1.6 kg (124 lbs) and renal function improving. Would continue with current dose of furosemide today. Continue with fluid and salt restriction.    2.  Moderate to severe aortic valve stenosis - Severe by exam. I worry this may be normal ejection fraction, low flow, low gradient aortic stenosis as her stroke volume index is only 29; DI = 0.28.  This is potential precipitant and can be further assessed in the outpatient setting if deemed necessary.    3.  Atrial fibrillation with rapid ventricular response - improved rate control this morning (81 BPM) with increased dose of metoprolol. Continue with Eliquis.    4.  Hypertension - for her age and history of pre-syncope her pressures are adequately controlled with lisinopril and metoprolol.    Walt Rios MD  [phone]    ------------------------------------------------------------------------------  Interval History  Feeling better with less orthopnea and edema    Physical Exam  Temp: 97.2  F (36.2  C) Temp src: Oral BP: 147/80 mmHg Pulse: 108 Heart Rate: 81 Resp: 18 SpO2: 94 % O2 Device: None (Room air)    Filed Vitals:    01/20/17 2203 01/21/17 0029 01/22/17 0705   Weight: 56.7 kg (125 lb) 58.1 kg (128 lb 1.4 oz) 56.5 kg (124 lb 9 oz)     Vital Signs with Ranges  Temp:  [95.6  F  (35.3  C)-98.1  F (36.7  C)] 97.2  F (36.2  C)  Pulse:  [108] 108  Heart Rate:  [] 81  Resp:  [16-18] 18  BP: (125-154)/(74-83) 147/80 mmHg  SpO2:  [92 %-100 %] 94 %  I/O last 3 completed shifts:  In: 240 [P.O.:240]  Out: 3675 [Urine:3675]    GENERAL:  The patient awake, alert, conversant and pleasant.    HEENT:  No scleral icterus.    NECK:  Elevated jugular venous pressure to 15-20 cm of water.    LUNGS:  Relatively clear with improved aeration    CARDIOVASCULAR:  Regularly irregular S1, diminished A2 component of the second heart sound and a prominent P2.  There is a 3/6 late peaking systolic ejection murmur that radiates to the carotids.  There is also a holosystolic murmur at the left sternal border which increases with inspiration and a faint holosystolic murmur best heard at the apex.  Carotid upstrokes are diminished +1 parvus with relatively normal timing.    ABDOMEN:  Nontender, nondistended with positive bowel sounds.    SKIN:  Warm, dry and intact with some ecchymosis.    EXTREMITIES:  Warm, erythematous, with 2-3+ pitting lower extremity edema.    NEUROPSYCHIATRIC:  The patient is awake, alert, oriented x3 and pleasant mood.       Medications    - MEDICATION INSTRUCTIONS -         apixaban ANTICOAGULANT  2.5 mg Oral BID     brimonidine  1 drop Both Eyes BID     latanoprost  1 drop Both Eyes At Bedtime     lisinopril  10 mg Oral Daily     pravastatin  20 mg Oral Daily     timolol  1 drop Both Eyes BID     traMADol (ULTRAM) tablet 50 mg  50 mg Oral At Bedtime     metoprolol  75 mg Oral BID     furosemide  40 mg Intravenous BID     sodium chloride (PF)  3 mL Intracatheter Q8H       Data    Recent Labs  Lab 01/22/17  0625 01/20/17  2221   WBC  --  9.3   HGB  --  13.7   MCV  --  99   PLT  --  178    135   POTASSIUM 3.3* 4.5   CHLORIDE 103 102   CO2 27 24   BUN 22 21   CR 1.15* 1.27*   ANIONGAP 11 9   CARLO 9.1 9.2   GLC 87 111*   ALBUMIN  --  3.6   PROTTOTAL  --  6.9   BILITOTAL  --  0.6   ALKPHOS   --  98   ALT  --  44   AST  --  44

## 2017-01-22 NOTE — PROGRESS NOTES
[facility]    Hospitalist Progress Note    Date of Service (when I saw the patient): 01/22/2017    Assessment and Plan  Rebecca Helms is an 88-year-old female with past medical history significant for atrial fibrillation, pacemaker status, hypertension, dyslipidemia, and diastolic congestive heart failure, mitral regurgitation, tricuspid regurgitation, pulmonary hypertension, aortic stenosis who presented to ER with complaints of dyspnea on exertion and weight gain.      Acute diastolic congestive heart failure exacerbation with hypoxia on exertion  Previous Echo 12/2016 with Ef 55-60%, grade 3 diastolic dysfunction and has not been on oral diuretics.  She is clearly volume overloaded on admission.  She recieved 1 dose of 40 mg IV Lasix in the ER.     - Started her on Lasix 20 mg IV b.i.d 1/21. Increased to 40 mg iv bid that same day with a good response.      - Negative > 3.6L in last 24 hrs (net 4.4 L since admit).  Stable renal fxn.    - Diuretic mngt per cards.   - Follow output and daily weights.    - Cardiology consult appreciated.    - Low-salt diet and will restrict fluid intake to 1800 mL per 24 hours.     - Resumed her Toprol-XL and increased to 75 mg 1/21.    - C/w pta lisinopril.   - CORE clinic eval and nutrition consult for diet education.    - Replace K .       Atrial fibrillation  Rate controlled initially but did have RVR after admission with hr's 120-130's.    - C/w pta apixaban and Toprol-XL increased to 75 mg 1/21.  - HR's  in last 24 hours.   - Tele.   - Lopressor iv prn.      Chronic kidney disease  Creatinine seems to be stable around her baseline of 1.2.     - Will monitor BMP with IV diuresis.         Hypertension.     - Continued on her pta Toprol-XL and lisinopril.     Dyslipidemia:     Resume pravastatin.        Valvular abnormalities   Including moderate mitral regurgitation, tricuspid regurgitation, moderate to severe aortic stenosis.    - outpt cardiology follow  up.   - may be a contributing factor to her symptoms above.         DVT Prophylaxis: Apixaban  Code Status: DNR/DNI    Disposition:  Pending ongoing clinical improvement.  Anticipate another day or so.        Garret Medina       Interval History  No acute overnight events. Breathing has improved form her perspective. Feels much better today.      -Data reviewed today: I reviewed all new labs and imaging results over the last 24 hours. I personally reviewed no images or EKG's today.    Physical Exam  Temp: 97.2  F (36.2  C) Temp src: Oral BP: 147/80 mmHg Pulse: 108 Heart Rate: 81 Resp: 18 SpO2: 94 % O2 Device: None (Room air)    Filed Vitals:    01/20/17 2203 01/21/17 0029 01/22/17 0705   Weight: 56.7 kg (125 lb) 58.1 kg (128 lb 1.4 oz) 56.5 kg (124 lb 9 oz)     Vital Signs with Ranges  Temp:  [95.6  F (35.3  C)-98.1  F (36.7  C)] 97.2  F (36.2  C)  Pulse:  [108] 108  Heart Rate:  [] 81  Resp:  [16-18] 18  BP: (125-154)/(74-83) 147/80 mmHg  SpO2:  [92 %-100 %] 94 %  I/O last 3 completed shifts:  In: 240 [P.O.:240]  Out: 3675 [Urine:3675]    Gen: Patient in no acute distress.  Appears comfortable.  Heart:  S1S2+, regular rate, + systolic murmur.   Lungs:  Clear to auscultation at apices, no wheezing, decreased at bases. Only very faint rales.    Abdomen:  Soft, non tender, non distended, bowel sounds positive.  Extremities:  B/L LE edema with compression wraps in place.     Medications    - MEDICATION INSTRUCTIONS -         apixaban ANTICOAGULANT  2.5 mg Oral BID     brimonidine  1 drop Both Eyes BID     latanoprost  1 drop Both Eyes At Bedtime     lisinopril  10 mg Oral Daily     pravastatin  20 mg Oral Daily     timolol  1 drop Both Eyes BID     traMADol (ULTRAM) tablet 50 mg  50 mg Oral At Bedtime     metoprolol  75 mg Oral BID     furosemide  40 mg Intravenous BID     sodium chloride (PF)  3 mL Intracatheter Q8H       Data    Recent Labs  Lab 01/22/17  0625 01/20/17  2221   WBC  --  9.3   HGB  --   13.7   MCV  --  99   PLT  --  178    135   POTASSIUM 3.3* 4.5   CHLORIDE 103 102   CO2 27 24   BUN 22 21   CR 1.15* 1.27*   ANIONGAP 11 9   CARLO 9.1 9.2   GLC 87 111*   ALBUMIN  --  3.6   PROTTOTAL  --  6.9   BILITOTAL  --  0.6   ALKPHOS  --  98   ALT  --  44   AST  --  44       No results found for this or any previous visit (from the past 24 hour(s)).

## 2017-01-22 NOTE — PLAN OF CARE
Problem: Goal Outcome Summary  Goal: Goal Outcome Summary  Outcome: Improving  Reports improved breathing comfort. Ambulated around the unit x3 this 12 hour overnight shift. VSS on room air. Voiding well. Tele: afib with CVR.

## 2017-01-22 NOTE — PLAN OF CARE
Problem: Goal Outcome Summary  Goal: Goal Outcome Summary  Outcome: Improving  A/O. Up with one and walker to bathroom frequently. Lasix increased to 40mg bid and metoprolol increased to 75 bid. Tele shows afib rate 100 to 120. Up in chair and worked with OT. Lymphedema wraps on. States breathing better. Lungs have decreased breath sounds at bases. Sats good on room air.

## 2017-01-22 NOTE — PLAN OF CARE
"Problem: Goal Outcome Summary  Goal: Goal Outcome Summary  PT/Lymphedema: Pt moving much better today, less coughing with mobility. Pt quite pleased with reduction of fluid in the LEs with application of wraps. States her \"nerve pain even improved.\" B LEs demonstrate excellent reduction, small portion of pitting edema over the metatarsal heads of the R foot; sigificantly less than yesterday. Socks improved with fit as well. Replaced gradient compression bandages, recommend continued use to further reduce the R foot and improve comfort. Ambulated 200+ feet with Mod I, small balance challenges with frequent standing rests, no LOB. Sit<>stand Mod I. Suspect pt is close to her baseline, recommend discharge home with continued home care at discharge including PT, RN, lymphedema. Pt will benefit from continued frequent ambulation with nursing staff outside of therapy.        "

## 2017-01-23 NOTE — PLAN OF CARE
Problem: Goal Outcome Summary  Goal: Goal Outcome Summary  Outcome: Improving  A/O x4. VSS. Tele A-fib with CVR, partially paced. Ambulated in mix with walker and GB, tolerated well, no TREVINO. BLEs with 0-1+ edema. Lymphedema wraps applied at 1200. Fair appetite on 2 gm Na diet, 1800 cc fluid restriction. 400 cc UOP this shift. Planning d/c tomorrow with home care RN, lymphedema and PT if kidney function stable.

## 2017-01-23 NOTE — PLAN OF CARE
Problem: Discharge Planning  Goal: Discharge Planning (Adult, OB, Behavioral, Peds)  CM: Anticipate goal for discharge plan to home with resumption of [facility] RN/PT/OT/Lymphedema.

## 2017-01-23 NOTE — PLAN OF CARE
Problem: Goal Outcome Summary  Goal: Goal Outcome Summary  PT/Lymph: Nursing rewrapped janina LEs at noon today. Observed edema significantly reduced janina, RN and patient in agreement. Nursing to reassess tomorrow if pt needing ongoing lymph wrapping will transition to nursing to complete.  May be appropriate for INEZ hose vs continued wrapping.  Ambulated over 300' mod I with cueing for posture, performed repeated sit<->stand for strengthening.  Rec disch home with home PT, RN and lymph eval for further lymph needs or compression garments.

## 2017-01-23 NOTE — PROGRESS NOTES
Care Transition Initial Assessment - SW  Reason For Consult: discharge planning  Met with: Patient    Active Problems:    Acute on chronic diastolic congestive heart failure (H)    Acute exacerbation of CHF (congestive heart failure) (H)         DATA  Lives With: alone  Living Arrangements: independent living facility  Description of Support System: Supportive, Involved  Who is your support system?: Children, Other (specify)  Support Assessment: Adequate social supports.   Identified issues/concerns regarding health management: None  Patient feels that they have adequate support @ home?  Yes.  Quality Of Family Relationships: supportive, involved  Transportation Available: family or friend will provide    Per social service protocol for discharge planning. Patient was admitted on 1/20/17 with Acute Exacerbation of CHF. SW met with patient to discuss team's recommendation to discharge home with resumption of home care. Per chart review, patient is open to [facility]. Patient reports she lives alone in an independent senior housing complex. She has one adult daughter, Lainey, who is actively involved with her care though she [place] of state. Per patient, she has a niece Tavia who lives locally who assists her with transportation and checks in with her frequently. Patient is agreeable to discharging back home to her apartment with resumption of [facility] services.  SW sent email referral to [facility] for resumption of services, per social service protocol.     SW and patient discussed transportation and pt reports her friend, Naman, is available to drive her home. SW offered to set up a wheelchair ride for patient if Naman is unavailable; patient is understanding that w/c ride is private pay and she can expect to pay $65/base plus $4.42/mile.       ASSESSMENT  Cognitive Status:  awake and alert  Concerns to be addressed: Discharge disposition.       PLAN  Patient Goals and Preferences: Home  with resumption of Home Care.   Patient anticipates discharging to:  Home with resumption of Home Care.     JENNIFER Brandt, SW  Ph: [phone]

## 2017-01-23 NOTE — PROGRESS NOTES
Patient is currently open to home care services with [facility].  The patient is currently receiving RN services.  [facility]  and team have been notified of patient admission.  [facility] liaison will continue to follow patient during stay.  If appropriate provide orders to resume home care at time of discharge.  Medina Brasher RN, BSN  [facility] Liaison  [phone]

## 2017-01-23 NOTE — PLAN OF CARE
Problem: Goal Outcome Summary  Goal: Goal Outcome Summary  Outcome: Improving  [id number] RN shift report: A&O x4.  Up with SBA, walker, and gait belt.  Ambulated hallway x4.  Up to bathroom frequently after IV Lasix.  Tele A. Fib with CVR.  On 2 gram Na diet and 1800 cc fluid restriction.  K+ replaced and recheck 4.2.  Denies pain other than occasional cramps in calves, relieved by walking.  Lymphedema wraps on.  Denies SOB.  Plan for transition to PO diuretics tomorrow and possible discharge.  Continue to monitor closely.

## 2017-01-23 NOTE — PROGRESS NOTES
[facility]    Hospitalist Progress Note    Date of Service (when I saw the patient): 01/23/2017    Assessment and Plan  Rebecca Helms is an 88-year-old female with past medical history significant for atrial fibrillation, pacemaker status, hypertension, dyslipidemia, and diastolic congestive heart failure, mitral regurgitation, tricuspid regurgitation, pulmonary hypertension, aortic stenosis who presented to ER with complaints of dyspnea on exertion and weight gain.      Acute diastolic congestive heart failure exacerbation with hypoxia on exertion  Previous Echo 12/2016 with Ef 55-60%, grade 3 diastolic dysfunction and has not been on oral diuretics.  She is clearly volume overloaded on admission.  She recieved 1 dose of 40 mg IV Lasix in the ER.     - Started on Lasix 20 mg IV b.i.d 1/21. Increased to 40 mg iv bid that same day with a good response.      - Negative almost 7 L in last 48 hrs (net 5.3 L since admit).    - Slightly alkalotic, cr up to 1.3 and UO down today so transitioned to po 40 mg 1/23. May need to adjust down further but will re-eval in am.      - Follow bmp in am.   - Follow output and daily weights.    - Cardiology consult appreciated.    - Low-salt diet and will restrict fluid intake to 1800 mL per 24 hours.     - Resumed her Toprol-XL and increased to 75 mg 1/21.     - C/w pta lisinopril.   - CORE clinic eval and nutrition consulted for diet education.  - Lymphedema wraps.         Atrial fibrillation  Rate controlled initially but did have RVR after admission with hr's 120-130's.    - C/w pta apixaban and Toprol-XL increased from 50 to 75 mg 1/21.  - HR's better controlled.   - Lopressor iv prn.      Chronic kidney disease  Creatinine seems to be stable around her baseline of 1.2.     - Cr 1.3 today.    - Follow bmp in am.  Lasix adjusted as above.         Hypertension.     - Continued on her pta Toprol-XL and lisinopril.     Dyslipidemia:     Resume pravastatin.         Valvular abnormalities   Including moderate mitral regurgitation, tricuspid regurgitation, moderate to severe aortic stenosis.    - outpt cardiology follow up.    - may be a contributing factor to her symptoms above.         DVT Prophylaxis: Apixaban  Code Status: DNR/DNI    Disposition:  Possibly home tomorrow if stable renal fxn.          Garret Medina       Interval History  No acute overnight events. Feels much better than when she arrived.  Le edema improved.      -Data reviewed today: I reviewed all new labs and imaging results over the last 24 hours. I personally reviewed no images or EKG's today.    Physical Exam  Temp: 95.8  F (35.4  C) Temp src: Oral BP: 120/50 mmHg   Heart Rate: 68 Resp: 18 SpO2: 95 % O2 Device: None (Room air)    Filed Vitals:    01/21/17 0029 01/22/17 0705 01/23/17 0259   Weight: 58.1 kg (128 lb 1.4 oz) 56.5 kg (124 lb 9 oz) 56.3 kg (124 lb 1.9 oz)     Vital Signs with Ranges  Temp:  [95.4  F (35.2  C)-97.2  F (36.2  C)] 95.8  F (35.4  C)  Heart Rate:  [61-98] 68  Resp:  [18-20] 18  BP: ()/(47-73) 120/50 mmHg  SpO2:  [92 %-97 %] 95 %  I/O last 3 completed shifts:  In: 2020 [P.O.:2020]  Out: 3000 [Urine:3000]    Gen: Patient in no acute distress.  Appears comfortable.  Heart:  S1S2+, regular rate and rhythm, + Systolic murmur.   Lungs:  Clear to auscultation, no wheezing, no rales.   Abdomen:  Soft, non tender, non distended, bowel sounds positive.  Extremities:  Improved LE edema.    Medications    - MEDICATION INSTRUCTIONS -         furosemide  40 mg Oral Daily     apixaban ANTICOAGULANT  2.5 mg Oral BID     brimonidine  1 drop Both Eyes BID     latanoprost  1 drop Both Eyes At Bedtime     lisinopril  10 mg Oral Daily     pravastatin  20 mg Oral Daily     timolol  1 drop Both Eyes BID     traMADol (ULTRAM) tablet 50 mg  50 mg Oral At Bedtime     metoprolol  75 mg Oral BID     sodium chloride (PF)  3 mL Intracatheter Q8H       Data    Recent Labs  Lab  01/23/17  0610 01/22/17  1451 01/22/17  0625 01/20/17  2221   WBC  --   --   --  9.3   HGB  --   --   --  13.7   MCV  --   --   --  99   PLT  --   --   --  178     --  141 135   POTASSIUM 3.6 4.2 3.3* 4.5   CHLORIDE 98  --  103 102   CO2 33*  --  27 24   BUN 27  --  22 21   CR 1.33*  --  1.15* 1.27*   ANIONGAP 7  --  11 9   CARLO 9.3  --  9.1 9.2   GLC 83  --  87 111*   ALBUMIN  --   --   --  3.6   PROTTOTAL  --   --   --  6.9   BILITOTAL  --   --   --  0.6   ALKPHOS  --   --   --  98   ALT  --   --   --  44   AST  --   --   --  44       No results found for this or any previous visit (from the past 24 hour(s)).

## 2017-01-23 NOTE — CONSULTS
[facility] Referral received from Dr. Medina. Patient is not currently established in the [facility]. CORE education to be given by hospital nurse. Nutrition consult noted. Hospital follow up appointments arranged.    [facility] appointment made for:   Monday, January 30th, 2017 at 9am for non fasting labs, and 10:10am to see JD Smith in [place] office  Alicia Kelly RN  [facility] nurse  [phone]      [facility]: Cardiomyopathy, Optimization, Rehabilitation, Education   The [facility] is a heart failure specialty clinic within the [facility] where you will work with specialized nurse practioners, physician assistants, doctors and registered nurses. They are dedicated to helping patients with heart failure to carefully adjust medications, receive education, and learn who and when to call if symptoms develop. They specialize in helping you better understand your condition, slow the progression of your disease, improve the length and quality of your life, help you detect future heart problems before they become life threatening, and avoid hospitalizations.

## 2017-01-23 NOTE — PLAN OF CARE
Problem: Goal Outcome Summary  Goal: Goal Outcome Summary  Outcome: Improving  Pt removed lymph wraps d/t pain. Legs looking much better after diuresis. Will encourage pt to reapply them before getting up today. VSS. Reports improved breathing. Up with SBA and walker to bathroom. Possible d/c today.

## 2017-01-24 NOTE — TELEPHONE ENCOUNTER
----- Message from Luis White MD sent at 1/24/2017 10:18 AM CST -----  Regarding: CORE appt  Please set pt up with ASAP (3-5 days) CORE NP post hospital follow up and CORE MD follow up with me in 2-4 weeks. Please sign her up for telemanagemnt. Weight range 116-124 lbs.     Thx    Luis White

## 2017-01-24 NOTE — PROGRESS NOTES
Pt DC in wheelchair via transport aide. DC packet discussed with patient and niece, niece to give ride back to senior living facility.

## 2017-01-24 NOTE — DISCHARGE SUMMARY
[facility]    Discharge Summary  Hospitalist    Date of Admission:  1/20/2017  Date of Discharge:  1/24/2017  Discharging Provider: Garret Medina  Date of Service (when I saw the patient): 01/24/2017    Discharge Diagnoses    Acute on chronic diastolic congestive heart failure exacerbation  Chronic Atrial fibrillation  Mod-Severe Aortic stenosis  Chronic kidney disease  Hypertension      History of Present Illness  Rebecca Helms is an 88-year-old female with past medical history significant for atrial fibrillation, pacemaker status, hypertension, dyslipidemia, and diastolic congestive heart failure, mitral regurgitation, tricuspid regurgitation, pulmonary hypertension, aortic stenosis who presented to ER with complaints of dyspnea on exertion and weight gain.        Hospital Course  Rebecca Helms was admitted on 1/20/2017.  The following problems were addressed during her hospitalization:    Acute on chronic diastolic congestive heart failure exacerbation with hypoxia on exertion  Previous Echo 12/2016 with Ef 55-60%, grade 3 diastolic dysfunction and has not been on oral diuretics pta.  She was clearly volume overloaded on admission with rales on lung exam as well as 2-3 + pitting edema in her LE's.  Exacerbation most likely related to dietary indiscretion.  Cardiology consulted.  Treated with IV lasix 40 mg iv bid with a good response. Net 5.7 L during her stay.  Transitioned to lasix 40 mg po daily on 1/23 and continued on this dose on discharge.  Low-salt diet and fluid restriction to 1800 mL per 24 hours encouraged.  Nutrition consulted for diet education, CORE clinic follow up placed on discharge and Charan stockings prescribed as well. Resumed her Toprol-XL on admission and increased to 75 mg 1/21. C/w pta lisinopril. Lymphedema wraps. I did speak with her daughter moe who lives in [place] over the phone and updated her with her mothers progress and care plan on discharge. Encouraged her  to follow up with her mother on her diet and daily weights.              Chronic Atrial fibrillation  Rate controlled initially but did have RVR after admission with hr's 120-130's. PTA Toprol-XL increased from 50 to 75 mg 1/21 with a good response.   Apixaban continued.     Valvular abnormalities   Mod- Severe As  Including moderate mitral regurgitation, tricuspid regurgitation.  Moderate to severe Aortic stenosis.  This may be normal ejection fraction, low flow, low gradient aortic stenosis as her stroke volume index is only 29; DI = 0.28.  Could be a precipitant to the above and can be further assessed in the outpatient.     Chronic kidney disease  Creatinine seems to be stable around her baseline of 1.2. Cr 1.33 on discharge. To follow up with her pcp and core clinic with a repeat BMP planned for this Friday 1/27.        Hypertension.     Continued on her pta Toprol-XL (adjusted as above) and lisinopril.      Garret Medina    Significant Results and Procedures  See below    Pending Results  These results will be followed up by PCP and Cardiology/Core clinic.   Unresulted Labs Ordered in the Past 30 Days of this Admission     No orders found from 11/22/2016 to 1/21/2017.          Code Status  DNR / DNI       Primary Care Physician  Ren Gipson    Physical Exam  Temp: 97  F (36.1  C) Temp src: Oral BP: 140/73 mmHg   Heart Rate: 65 Resp: 16 SpO2: 95 % O2 Device: None (Room air)    Filed Vitals:    01/21/17 0029 01/22/17 0705 01/23/17 0259   Weight: 58.1 kg (128 lb 1.4 oz) 56.5 kg (124 lb 9 oz) 56.3 kg (124 lb 1.9 oz)     Vital Signs with Ranges  Temp:  [96  F (35.6  C)-97  F (36.1  C)] 97  F (36.1  C)  Heart Rate:  [65-79] 65  Resp:  [16] 16  BP: (113-140)/(59-73) 140/73 mmHg  SpO2:  [95 %-96 %] 95 %  I/O last 3 completed shifts:  In: 770 [P.O.:770]  Out: 1200 [Urine:1200]    Gen: Patient in no acute distress.  Appears comfortable.  Heart:  S1S2+, regular rate and rhythm, + Systolic  murmur.  Lungs:  Clear to auscultation, no wheezing, no rales.   Abdomen:  Soft, non tender, non distended, bowel sounds positive.  Extremities:  Trace LE edema.    Discharge Disposition  Discharged to home  Condition at discharge: Stable    Consultations This Hospital Stay  CARDIOLOGY IP CONSULT  PHYSICAL THERAPY ADULT IP CONSULT  SOCIAL WORK IP CONSULT  WOUND OSTOMY CONTINENCE NURSE  IP CONSULT  LYMPHEDEMA THERAPY IP CONSULT  NUTRITION SERVICES ADULT IP CONSULT  CORE CLINIC EVALUATION IP CONSULT    Time Spent on This Encounter  I, Garret Medina, personally saw the patient today and spent greater than 30 minutes discharging this patient.    Discharge Orders    Compression stockings     Home care nursing referral     Home Care PT Referral for Hospital Discharge     Home Care OT Referral for Hospital Discharge     Reason for your hospital stay   Congestive Heart Failure     Activity   Your activity upon discharge: activity as tolerated     Monitor and record   weight every day     When to contact your care team   Call your primary doctor if you have any of the following:  increased shortness of breath,  increased swelling or weight gain of more than 5 lbs in 3 days.     MD face to face encounter   Documentation of Face to Face and Certification for Home Health Services    I certify that patient: Rebecca Helms is under my care and that I, or a nurse practitioner or physician's assistant working with me, had a face-to-face encounter that meets the physician face-to-face encounter requirements with this patient on: 1/24/2017.    This encounter with the patient was in whole, or in part, for the following medical condition, which is the primary reason for home health care: CHF.    I certify that, based on my findings, the following services are medically necessary home health services: Nursing, Occupational Therapy and Physical Therapy.    My clinical findings support the need for the above services because:  Nurse is needed: To assess resp status, weight, fluid status, BP after changes in medications or other medical regimen.., Occupational Therapy Services are needed to assess and treat Lymphedema . and Physical Therapy Services are needed to assess and treat the following functional impairments: Deconditioning.    Further, I certify that my clinical findings support that this patient is homebound (i.e. absences from home require considerable and taxing effort and are for medical reasons or [employer] services or infrequently or of short duration when for other reasons) because: Requires assistance of another person or specialized equipment to access medical services because patient: Is unable to operate assistive equipment on their own...    Based on the above findings. I certify that this patient is confined to the home and needs intermittent skilled nursing care, physical therapy and/or speech therapy.  The patient is under my care, and I have initiated the establishment of the plan of care.  This patient will be followed by a physician who will periodically review the plan of care.  Physician/Provider to provide follow up care: Ren Gipson    Attending hospital physician (the Medicare certified [place] provider): Garret Medina  Physician Signature: See electronic signature associated with these discharge orders.  Date: 1/24/2017     Follow-up and recommended labs and tests    Follow up with primary care provider, Ren Gipson, within 3-5 days to evaluate medication change and for hospital follow- up.  The following labs/tests are recommended: Will need to follow volume status, blood pressure, HR and renal function as her Metoprolol was increased and she was started in a Diuretic for her HF.  BMP to be completed on Friday.  Ensure cardiology follow up for her Aortic Stenosis.   - Cardiology follow up for her Aortic Stenosis.     DNR/DNI     Diet   Follow this diet upon discharge:   Fluid  restriction 1800 ML FLUID  Combination Diet Regular Diet Adult; 2 gm NA Diet       Discharge Medications  Discharge Medication List as of 1/24/2017  8:42 AM      START taking these medications    Details   furosemide (LASIX) 40 MG tablet Take 1 tablet (40 mg) by mouth daily, Disp-30 tablet, R-3, E-Prescribe         CONTINUE these medications which have CHANGED    Details   metoprolol (TOPROL-XL) 25 MG 24 hr tablet Take 3 tablets (75 mg) by mouth 2 times daily, Disp-180 tablet, R-3, E-PrescribeHold for sbp < 100 or HR < 60         CONTINUE these medications which have NOT CHANGED    Details   acetaminophen (TYLENOL ARTHRITIS PAIN) 650 MG CR tablet Take 650 mg by mouth daily, Historical      lisinopril (PRINIVIL/ZESTRIL) 10 MG tablet Take 1 tablet (10 mg) by mouth daily at night, No Print Out      moxifloxacin (VIGAMOX) 0.5 % ophthalmic solution Place 1 drop Into the left eye daily as needed (eye infections) , Historical      apixaban ANTICOAGULANT (ELIQUIS) 2.5 MG tablet Take 2.5 mg by mouth 2 times daily , Disp-90 tablet, R-1, Historical* DO NOT FILL.  XARELTO IS DISCONTINUED AND PT WILL CONTINUE WITH ELIQUIS. SHE HAS A 60 DAY SUPPLY AT HOME (ELIQUIS) THIS IS FOR DOCUMENTATION PURPOSES ONLY. [facility]      brimonidine (ALPHAGAN-P) 0.15 % ophthalmic solution Place 1 drop into both eyes 2 times daily , Historical      pravastatin (PRAVACHOL) 20 MG tablet Take 1 tablet (20 mg) by mouth daily, Disp-30 tablet, R-11, E-Prescribe      calcium-vitamin D (CALTRATE) 600-400 MG-UNIT per tablet Take 1 tablet by mouth daily , Historical      latanoprost (XALATAN) 0.005 % ophthalmic solution Place 1 drop into both eyes At Bedtime, Historical      timolol (TIMOPTIC) 0.5 % ophthalmic solution Place 1 drop into both eyes 2 times daily, Historical      TRAMADOL HCL PO Take 50 mg by mouth At Bedtime Patient takes scheduled, Historical      multivitamin, therapeutic with minerals (MULTI-VITAMIN) TABS Take 1 tablet by mouth  daily., Historical      order for DME Equipment being ordered: Walker Wheels () and Walker ()  Treatment Diagnosis: difficulty with gaitDisp-1 each, R-0, Local Print           Allergies  No Known Allergies  Data  Most Recent 3 CBC's:  Recent Labs   Lab Test  01/20/17   2221  12/23/16   0122  12/10/16   0559   WBC  9.3  12.4*  10.0   HGB  13.7  13.4  11.7   MCV  99  98  96   PLT  178  197  158      Most Recent 3 BMP's:  Recent Labs   Lab Test  01/24/17   0707  01/23/17   0610  01/22/17   1451  01/22/17   0625   NA  137  138   --   141   POTASSIUM  3.7  3.6  4.2  3.3*   CHLORIDE  98  98   --   103   CO2  31  33*   --   27   BUN  30  27   --   22   CR  1.33*  1.33*   --   1.15*   ANIONGAP  8  7   --   11   CARLO  9.1  9.3   --   9.1   GLC  79  83   --   87     Most Recent 2 LFT's:  Recent Labs   Lab Test  01/20/17   2221  12/09/16   0551   AST  44  38   ALT  44  39   ALKPHOS  98  70   BILITOTAL  0.6  0.8     Most Recent INR's and Anticoagulation Dosing History:        Anticoagulation Dose History     Recent Dosing and Labs Latest Ref Rng 8/6/2011 12/8/2016    INR 0.86 - 1.14 1.08 1.17(H)        Most Recent 3 Troponin's:  Recent Labs   Lab Test  12/09/16   0551  12/09/16   0055  12/08/16   1830   TROPI  0.035  0.057*  <0.015  The 99th percentile for upper reference range is 0.045 ug/L.  Troponin values in   the range of 0.045 - 0.120 ug/L may be associated with risks of adverse   clinical events.       Most Recent Cholesterol Panel:No lab results found.  Most Recent 6 Bacteria Isolates From Any Culture (See EPIC Reports for Culture Details):  Recent Labs   Lab Test  08/28/14   1730   CULT  10,000 to 50,000 colonies/mL mixed urogenital kaye     Most Recent TSH, T4 and A1c Labs:  Recent Labs   Lab Test  12/09/16   0055   TSH  0.61       Results for orders placed or performed during the hospital encounter of 01/20/17   XR Chest 2 Views    Narrative    CHEST TWO VIEWS  1/20/2017 10:35 PM     COMPARISON: Frontal  chest x-ray 12/8/2016.    HISTORY: Shortness of breath.      Impression    IMPRESSION: A dual-lead pacemaker module implanted over the left chest  with the leads in the right atrium and right ventricle is again noted  without identifiable change.    There is no severe cardiomegaly. There are new bilateral pleural  effusions. There are no definite airspace opacities to suggest  pneumonia. There is no pneumothorax.    WILFRID HARGROVE MD

## 2017-01-24 NOTE — TELEPHONE ENCOUNTER
Spoke w/ pt. She wrote down her appt times/dates for 1/27 w/ Far[facility] PAC and 2/10 w/ valerie and Dr. White. She has someone to drive her to these appts. Paged [facility] to request BMP prior to 1/27 OV.    We discussed our telemanagement program. She says she's done this in the past. We reviewed instructions and she agrees to proceed. Reminder sent to CORE nurse board to watch for her first call in 1/25. Gave her CORE RN number if she has questions or concerns.

## 2017-01-24 NOTE — PLAN OF CARE
Problem: Goal Outcome Summary  Goal: Goal Outcome Summary  Outcome: No Change  VSS. Tele afib with CVR with occasional pacing. A&O x4, hard of hearing. Denies pain. Slept between cares. Lymphedema wraps on bilateral LE. Up SBA + walker to bathroom. Possible discharge to home today.

## 2017-01-24 NOTE — PLAN OF CARE
Problem: Goal Outcome Summary  Goal: Goal Outcome Summary  Physical Therapy Discharge Summary    Reason for therapy discharge:    Discharged to home with home therapy.    Progress towards therapy goal(s). See goals on Care Plan in [facility] electronic health record for goal details.  Goals partially met.  Barriers to achieving goals:   discharge from facility.    Therapy recommendation(s):    Continued therapy is recommended.  Rationale/Recommendations:  Recommend home PT to progress functional mobility.     Recommend discharge home with home PT, RN and lymph eval for further lymph needs or compression garments.

## 2017-01-24 NOTE — PLAN OF CARE
Problem: Goal Outcome Summary  Goal: Goal Outcome Summary  Outcome: No Change  VSS.  Tele Afib with CVR.  Up with SBA and walker, ambulated.  Fluid Restriction.  Lymphodema wraps intact.  Tylenol PRN.  Likely discharge tomorrow if creatinine improves.

## 2017-01-24 NOTE — TELEPHONE ENCOUNTER
Per Dr. White's orders below. Pt was already scheduled to see CORE NATACHA 1/30-rescheduled this appt to 1/27 so that pt has close f/u post 2 HF admits. Scheduled pt to see Dr. White 2/10 w/ labs-she may be able to have home care draw these if still receiving those services at that time.     Called pt to review plan. She had just gotten home from hospital. She requests I call back later this afternoon.

## 2017-01-24 NOTE — PROGRESS NOTES
"[facility]    Cardiology Progress Note    Date of Service (when I saw the patient): 01/23/2017     Assessment and Plan  Rebecca Helms is a 88 year old female who carries a PMH of paroxysmal atrial fibrillation on therapeutic anticoagulation, sinus node dysfunction with history of syncope, status post permanent pacemaker implantation on 09/2014, hypertension, dyslipidemia, carotid artery stenosis, and moderate to severe aortic valve stenosis was admitted on 1/20/2017 with acute on chronic diastolic heart failure and atrial fibrillation with rapid ventricular response.    RECOMMENDATIONS:  1.  Acute on chronic diastolic heart failure - dry weight (118 lbs). Weight here may not be accurate. Down 4 lbs but excellent diruesis. She feels that she \"turned the corner\" yesterday with much improved strength and breathing. Now able to walk in halls with out dyspnea. Could not walk to the door at time of admission. Continue with fluid and salt restriction.    2.  Moderate to severe aortic valve stenosis - Severe by exam. I worry this may be normal ejection fraction, low flow, low gradient aortic stenosis as her stroke volume index is only 29; DI = 0.28.  This is potential precipitant and can be further assessed in the outpatient setting if deemed necessary.    3.  Atrial fibrillation with rapid ventricular response - improved rate control this morning (81 BPM) with increased dose of metoprolol. Continue with Eliquis.    4.  Hypertension - for her age and history of pre-syncope her pressures are adequately controlled with lisinopril and metoprolol.    Making good progress with diuresis.   Plan for discharge in 1-2 days. Home vs TCU? She lives alone in [address]   Continue diuresis for now.     Luis White MD  Cardiology    ------------------------------------------------------------------------------  Interval History  Feeling better with less orthopnea and edema    Physical Exam  Temp: 96  F (35.6  C) Temp src: " Oral BP: 116/68 mmHg   Heart Rate: 67 Resp: 16 SpO2: 96 % O2 Device: None (Room air)    Filed Vitals:    01/21/17 0029 01/22/17 0705 01/23/17 0259   Weight: 58.1 kg (128 lb 1.4 oz) 56.5 kg (124 lb 9 oz) 56.3 kg (124 lb 1.9 oz)     Vital Signs with Ranges  Temp:  [95.8  F (35.4  C)-97.2  F (36.2  C)] 96  F (35.6  C)  Heart Rate:  [61-98] 67  Resp:  [16-20] 16  BP: ()/(47-68) 116/68 mmHg  SpO2:  [92 %-97 %] 96 %  I/O last 3 completed shifts:  In: 2000 [P.O.:2000]  Out: 1850 [Urine:1850]    GENERAL:  The patient awake, alert, conversant and pleasant.    HEENT:  No scleral icterus.    NECK:  Elevated jugular venous pressure to 15-20 cm of water.    LUNGS:  Relatively clear with improved aeration    CARDIOVASCULAR:  Regularly irregular S1, diminished A2 component of the second heart sound and a prominent P2.  There is a 3/6 late peaking systolic ejection murmur that radiates to the carotids.  There is also a holosystolic murmur at the left sternal border which increases with inspiration and a faint holosystolic murmur best heard at the apex.  Carotid upstrokes are diminished +1 parvus with relatively normal timing.    ABDOMEN:  Nontender, nondistended with positive bowel sounds.    SKIN:  Warm, dry and intact with some ecchymosis.    EXTREMITIES:  Warm, erythematous, with 2-3+ pitting lower extremity edema.    NEUROPSYCHIATRIC:  The patient is awake, alert, oriented x3 and pleasant mood.       Medications    - MEDICATION INSTRUCTIONS -         furosemide  40 mg Oral Daily     apixaban ANTICOAGULANT  2.5 mg Oral BID     brimonidine  1 drop Both Eyes BID     latanoprost  1 drop Both Eyes At Bedtime     lisinopril  10 mg Oral Daily     pravastatin  20 mg Oral Daily     timolol  1 drop Both Eyes BID     traMADol (ULTRAM) tablet 50 mg  50 mg Oral At Bedtime     metoprolol  75 mg Oral BID     sodium chloride (PF)  3 mL Intracatheter Q8H       Data    Recent Labs  Lab 01/23/17  0610 01/22/17  1451 01/22/17  0625  01/20/17  2221   WBC  --   --   --  9.3   HGB  --   --   --  13.7   MCV  --   --   --  99   PLT  --   --   --  178     --  141 135   POTASSIUM 3.6 4.2 3.3* 4.5   CHLORIDE 98  --  103 102   CO2 33*  --  27 24   BUN 27  --  22 21   CR 1.33*  --  1.15* 1.27*   ANIONGAP 7  --  11 9   CARLO 9.3  --  9.1 9.2   GLC 83  --  87 111*   ALBUMIN  --   --   --  3.6   PROTTOTAL  --   --   --  6.9   BILITOTAL  --   --   --  0.6   ALKPHOS  --   --   --  98   ALT  --   --   --  44   AST  --   --   --  44

## 2017-01-25 NOTE — TELEPHONE ENCOUNTER
TELEMANAGEMENT:  No call-in yet today.  Called pt to reminder her to call in to report wt and sx today, phone is busy.  Will attempt to call pt later today.

## 2017-01-25 NOTE — TELEPHONE ENCOUNTER
Received call from ZULLY Garcia with [facility] who states she will be seeing pt tomorrow to resume home care and requests verbal order for BMP to be drawn. Order given for BMP tomorrow, 1/26/17.

## 2017-01-25 NOTE — TELEPHONE ENCOUNTER
Spoke with pt, she tried to call in weight but it would not work. Took survey over the phone. Home wt today is 114#. She is feeling well. She is able to button pants that she has been unable to wear in a long time, and she is quite happy about that. Current wt parameters 116-124#. Wt 2# below min, but parameters based on hospital weights. Will continue to monitor and address wt parameters when pt seen in clinic on Friday 1/27. ZULLY Stafford

## 2017-01-26 NOTE — TELEPHONE ENCOUNTER
Received VM from ZULLY Garcia with [facility]. She was unable to get BMP today, she tried but pt has tiny veins and unable to get anything. Called pt, she is getting a cab to bring her to her appt tomorrow. She can only come 20-30 minutes early. Scheduled her for BMP prior to visit tomorrow with Bradly Stafford RN

## 2017-01-26 NOTE — TELEPHONE ENCOUNTER
TELEMANAGEMENT: Wt 114#, same as yesterday, below min wt parameters. Pt having BMP today and seeing [facility] tomorrow as hospital follow up. Will address tomorrow in clinic. ZULLY Stafford

## 2017-01-27 NOTE — Clinical Note
2017    MD KVNG SandersINA [facility] MANAGEMENT [id number]   [address]  [place] [address]    RE: Rebecca Helms       Dear Colleague,    I had the pleasure of seeing Rebecca Helms in the [facility].    PRIMARY CARDIOLOGIST:  Dr. Warren, seen by Dr. White in the hospital.      REASON FOR VISIT:  Hospital followup, [facility].O.[facility].E. Clinic enrollment.      Ms. Helms is a delightful 88-year-old woman with past cardiac history significant for the followin.  Paroxysmal atrial fibrillation with anticoagulation.   2.  Sinus node dysfunction with permanent pacemaker in place.   3.  Hypertension.   4.  Dyslipidemia.   5.  Carotid artery stenosis.   6.  Moderate to severe aortic stenosis.   7.  Hypertension.      She has had a pretty rough couple of months.  She was seen by Dr. Warren in the beginning of December and had described some chest discomfort.  She went on to have a stress test.  This was negative with a normal EF.  Over the fall she was considered to potentially have severe AS.  This was reviewed by the TAVR team and was felt to be moderate to severe.  She had an episode of acute pulmonary edema when she got off the bus at [employer] and felt severely short of breath.  She was found to be in AFib with RVR.  This combined with her moderate to severe AS may have contributed to this.  She was discharged home and then followed with JD Chakraborty and had difficulty throughout December describing spells where she had the feeling of doom, some chest discomfort, some breathing problems, some throat discomfort.  She had an ER visit for this on , continued to feel it into early January in spite of adjustments by Elisabeth Chakraborty to her medications.  She was finally admitted again on .  There was again a discussion whether her valve was worse and possible worse than expected, and her weight had gone up to 128 pounds.  It was felt that she had heart failure with preserved EF.  Again,  she had AFib with RVR, and her metoprolol-XL, which had been 100 mg and had been cut back to 50 mg, was then put to 75 mg twice a day.  She was diuresed down to a hospital weight of about 120.  She was discharged home.      She comes in today for followup of this.  She was just discharged 3 days ago.  Her weight at home has been stable at 113-114 pounds.  She feels like she is getting better every day.  She can walk all the way down her hallway to the elevator without stopping.  Prior to this, she sometimes had to stop and she was would not want to leave her apartment even to get her mail because she felt so poorly.  Her spells she continued to describe as feeling exhausted and weak and short of breath when she has them, but they have not happened since hospitalization.  She is trying to eat low-salt.  She noted her heart rate was a little bit faster this morning, but she did not feel it as palpitations, she felt it only when checking her pulse.  She denies orthopnea or PND.  She has no more pedal edema, this has resolved completely.      SOCIAL HISTORY:  She comes in today with her niece, Tavia, who is a HUC on fifth floor here, which is Orthopedics.  She has a daughter who lives in [place] who comes to visit every 6 weeks and calls daily.  She is also trying to help her out with foods.  She lives in [address] [place] [address] in a co-op.      PHYSICAL EXAMINATION:   GENERAL:  Well-developed, well-nourished, well-dressed woman in no acute distress, appears her stated age.   HEENT:  Normocephalic, atraumatic.   HEART:  Regular with a 3/6 systolic murmur heard at the right sternal border and throughout the precordium.   LUNGS:  Diminished bilaterally but no wheezes, rales or rhonchi.   EXTREMITIES:  Without peripheral edema, 2 cm of JVD above the sternal notch with minimal hepatojugular reflux.     Outpatient Encounter Prescriptions as of 1/27/2017   Medication Sig Dispense Refill     metoprolol (TOPROL-XL) 25 MG 24  hr tablet Take 3 tablets (75 mg) by mouth 2 times daily 180 tablet 3     furosemide (LASIX) 40 MG tablet Take 1 tablet (40 mg) by mouth daily 30 tablet 3     acetaminophen (TYLENOL ARTHRITIS PAIN) 650 MG CR tablet Take 650 mg by mouth daily       lisinopril (PRINIVIL/ZESTRIL) 10 MG tablet Take 1 tablet (10 mg) by mouth daily at night       moxifloxacin (VIGAMOX) 0.5 % ophthalmic solution Place 1 drop Into the left eye daily as needed (eye infections)        apixaban ANTICOAGULANT (ELIQUIS) 2.5 MG tablet Take 2.5 mg by mouth 2 times daily  90 tablet 1     brimonidine (ALPHAGAN-P) 0.15 % ophthalmic solution Place 1 drop into both eyes 2 times daily        pravastatin (PRAVACHOL) 20 MG tablet Take 1 tablet (20 mg) by mouth daily 30 tablet 11     calcium-vitamin D (CALTRATE) 600-400 MG-UNIT per tablet Take 1 tablet by mouth daily        latanoprost (XALATAN) 0.005 % ophthalmic solution Place 1 drop into both eyes At Bedtime       timolol (TIMOPTIC) 0.5 % ophthalmic solution Place 1 drop into both eyes 2 times daily       TRAMADOL HCL PO Take 50 mg by mouth At Bedtime Patient takes scheduled       multivitamin, therapeutic with minerals (MULTI-VITAMIN) TABS Take 1 tablet by mouth daily.       order for DME Equipment being ordered: Walker Wheels () and Walker ()  Treatment Diagnosis: difficulty with gait 1 each 0     No facility-administered encounter medications on file as of 1/27/2017.      ASSESSMENT AND PLAN:    1.  Recent admission for acute heart failure with preserved EF with her home dry weight now probably right around 113-114 pounds.  I am fine if this goes up for caloric reasons, although I do think she is euvolemic at this point.  This begs the question that if she had episodes of AFib with RVR, did they cause pulmonary edema intermittently or other feelings of profound fatigue in the context of also moderate to severe aortic stenosis.  At this point, we will try to keep her in this weight range and  keep her euvolemic and see if we can prevent both these feelings of doom and readmissions for heart failure.  Her blood pressure today is stable at 116/62, and she does not describe any orthostatic features.  At this point, we will continue her Lasix at 40 mg daily, Toprol 75 mg b.i.d. and her lisinopril at 10.  She likely has some right heart failure, and at some point we should consider adding spironolactone in this patient.  At this point, though, this is the best she has felt in a long time, and I do not want to make adjustments that may worsen her.   2.  Permanent pacemaker in place with known AFib with RVR, on Eliquis for anticoagulation at 2.5 dose given age and weight.   3.  Hypertension, well controlled.   4.  Moderate to severe aortic stenosis.  This apparently has been reviewed by Dr. Parker to be evaluated for its severity and is currently felt to be moderate to severe.  Her valve area is small, and there is a question if this is a low-flow, low-gradient, normal EF, aortic stenosis.  I think this can be reassessed, as she maintains euvolemia.  She certainly would be to have TAVR candidate if it needs to be replaced.      Thank you for allowing me to participate in this delightful patient's care.  She has followup scheduled with Dr. White in 2 weeks already.  We will be following her on tele-assurance between now and then.     Sincerely,    Maday Diaz PA-C     [facility]

## 2017-01-27 NOTE — MR AVS SNAPSHOT
"              After Visit Summary   1/27/2017    Rebecca Helms    MRN: [id number]           Patient Information     Date Of Birth          6/18/1928        Visit Information        Provider Department      1/27/2017 1:50 PM More, Maday Mckenzie PA-C [facility] AT [place]        Today's Diagnoses     Chronic diastolic CHF (congestive heart failure) (H)    -  1     Chronic diastolic congestive heart failure (H)           Care Instructions      Call [facility] nurse for any questions or concerns:    Shyanne Vargas, ketan Garcia: [phone]   If you have concerns after hours, please call [phone], option 2    1. Medication changes:  Continue current medications.      2. Weigh yourself daily and write it down.  We will consider your \"dry weight\" 110-115 pounds.       3. Call [facility] nurse if your weight is up more than 2 pounds in one day, or 5 pounds in one week.    4. Call [facility] nurse if you feel more short of breath, have more abdominal bloating or leg swelling.    5. Continue low sodium diet (less than 2000mg daily). If you eat less salt, you will retain less fluid.     6. Lab results: These overall look good.    Component      Latest Ref Rng 1/27/2017   Sodium      136 - 145 mmol/L 134 (L)   Potassium      3.5 - 5.1 mmol/L 4.4   Chloride      98 - 107 mmol/L 96 (L)   Carbon Dioxide      23 - 29 mmol/L 33 (H)   Anion Gap      6 - 17 mmol/L 9.4   Glucose      70 - 105 mg/dL 97   Urea Nitrogen      7 - 30 mg/dL 23   Creatinine      0.70 - 1.30 mg/dL 1.30   GFR Estimate      >60 mL/min/1.7m2 39 (L)   GFR Estimate If Black      >60 mL/min/1.7m2 47 (L)   Calcium      8.5 - 10.5 mg/dL 9.9     **Do NOT take Aleve or Ibuprofen without checking with your doctor first    [facility]: Cardiomyopathy, Optimization, Rehabilitation, Education   The [facility] is a heart failure specialty clinic within the [facility] where you will work with specialized nurse " practioners, physician assistants, doctors and registered nurses. They are dedicated to helping patients with heart failure to carefully adjust medications, receive education, and learn who and when to call if symptoms develop. They specialize in helping you better understand your condition, slow the progression of your disease, improve the length and quality of your life, help you detect future heart problems before they become life threatening, and avoid hospitalizations.              Follow-ups after your visit        Your next 10 appointments already scheduled     Feb 10, 2017  9:10 AM   LAB with [facility]   [facility] ([facility])    [address] [place] [id number]   [phone]           Patient must bring picture ID.  Patient should be prepared to give a urine specimen  Please do not eat 10-12 hours before your appointment if you are coming in fasting for labs on lipids, cholesterol, or glucose (sugar).  Pregnant women should follow their Care Team instructions. Water with medications is okay. Do not drink coffee or other fluids.   If you have concerns about taking  your medications, please ask at office or if scheduling via [employer], send a message by clicking on Secure Messaging, Message Your Care Team.            Feb 10, 2017 10:15 AM   Core MD [place] with Luis White MD   [facility] ([facility])    [address] [place] [id number]   [phone]            Mar 09, 2017 11:30 AM   LAB with [facility]   [facility] ([facility])    [address] [place] [address]   [phone]           Patient must bring picture ID.  Patient should be prepared to give a urine specimen  Please do not eat 10-12 hours before your appointment if you are coming in fasting for labs on lipids, cholesterol, or glucose  (sugar).  Pregnant women should follow their Care Team instructions. Water with medications is okay. Do not drink coffee or other fluids.   If you have concerns about taking  your medications, please ask at office or if scheduling via [employer], send a message by clicking on Secure Messaging, Message Your Care Team.            Mar 09, 2017 12:30 PM   Return Visit with Lisbet Chakraborty PA-C   [facility] PHYS[facility] AT [place] ([facility])    [address]  [facility] [place] [facility] [id number] [phone]            Mar 09, 2017  2:00 PM   Remote PPM Check with MADRID TECH1   [facility] ([facility])    [address]  [place] [id number] [phone]           This appointment is for a remote check of your pacemaker.  This is not an appointment at the office.              Who to contact     If you have questions or need follow up information about today's clinic visit or your schedule please contact [facility] directly at [phone].  Normal or non-critical lab and imaging results will be communicated to you by [employer], letter or phone within 4 business days after the clinic has received the results. If you do not hear from us within 7 days, please contact the clinic through [employer] or phone. If you have a critical or abnormal lab result, we will notify you by phone as soon as possible.  Submit refill requests through [employer] or call your pharmacy and they will forward the refill request to us. Please allow 3 business days for your refill to be completed.          Additional Information About Your Visit        [employer] Information     [employer] gives you secure access to your electronic health record. If you see a primary care provider, you can also send messages to your care team and make appointments. If you have questions, please call your primary care clinic.  If you do  "not have a primary care provider, please call [phone] and they will assist you.        Care EveryWhere ID     This is your Care EveryWhere ID. This could be used by other organizations to access your [place] medical records  [id number]        Your Vitals Were     Pulse Height BMI (Body Mass Index) Pulse Oximetry          60 1.549 m (5' 1\") 22.04 kg/m2 95%         Blood Pressure from Last 3 Encounters:   01/27/17 116/62   01/24/17 140/73   01/03/17 118/72    Weight from Last 3 Encounters:   01/27/17 52.889 kg (116 lb 9.6 oz)   01/23/17 56.3 kg (124 lb 1.9 oz)   01/03/17 56.201 kg (123 lb 14.4 oz)              We Performed the Following     Follow-Up with [facility]        Primary Care Provider Office Phone # Fax #    Ren Gipson [phone]       RENETTA [facility] MANAGEMENT [address] [address]  [facility] [address]        Thank you!     Thank you for choosing [facility] [place]  for your care. Our goal is always to provide you with excellent care. Hearing back from our patients is one way we can continue to improve our services. Please take a few minutes to complete the written survey that you may receive in the mail after your visit with us. Thank you!             Your Updated Medication List - Protect others around you: Learn how to safely use, store and throw away your medicines at www.disposemymeds.org.          This list is accurate as of: 1/27/17  2:36 PM.  Always use your most recent med list.                   Brand Name Dispense Instructions for use    apixaban ANTICOAGULANT 2.5 MG tablet    ELIQUIS    90 tablet    Take 2.5 mg by mouth 2 times daily       brimonidine 0.15 % ophthalmic solution    ALPHAGAN-P     Place 1 drop into both eyes 2 times daily       calcium-vitamin D 600-400 MG-UNIT per tablet    CALTRATE     Take 1 tablet by mouth daily       furosemide 40 MG tablet    LASIX    30 tablet    Take 1 tablet (40 mg) by mouth daily       " latanoprost 0.005 % ophthalmic solution    XALATAN     Place 1 drop into both eyes At Bedtime       lisinopril 10 MG tablet    PRINIVIL/ZESTRIL     Take 1 tablet (10 mg) by mouth daily at night       metoprolol 25 MG 24 hr tablet    TOPROL-XL    180 tablet    Take 3 tablets (75 mg) by mouth 2 times daily       moxifloxacin 0.5 % ophthalmic solution    VIGAMOX     Place 1 drop Into the left eye daily as needed (eye infections)       Multi-vitamin Tabs tablet      Take 1 tablet by mouth daily.       order for DME     1 each    Equipment being ordered: Walker Wheels () and Walker () Treatment Diagnosis: difficulty with gait       pravastatin 20 MG tablet    PRAVACHOL    30 tablet    Take 1 tablet (20 mg) by mouth daily       timolol 0.5 % ophthalmic solution    TIMOPTIC     Place 1 drop into both eyes 2 times daily       TRAMADOL HCL PO      Take 50 mg by mouth At Bedtime Patient takes scheduled       TYLENOL ARTHRITIS PAIN 650 MG CR tablet   Generic drug:  acetaminophen      Take 650 mg by mouth daily

## 2017-01-27 NOTE — PROGRESS NOTES
[id number]  HPI and Plan:   See dictation    Orders this Visit:  No orders of the defined types were placed in this encounter.     No orders of the defined types were placed in this encounter.     There are no discontinued medications.      Encounter Diagnoses   Name Primary?     Chronic diastolic congestive heart failure (H)      Chronic diastolic CHF (congestive heart failure) (H) Yes       CURRENT MEDICATIONS:  Current Outpatient Prescriptions   Medication Sig Dispense Refill     metoprolol (TOPROL-XL) 25 MG 24 hr tablet Take 3 tablets (75 mg) by mouth 2 times daily 180 tablet 3     furosemide (LASIX) 40 MG tablet Take 1 tablet (40 mg) by mouth daily 30 tablet 3     acetaminophen (TYLENOL ARTHRITIS PAIN) 650 MG CR tablet Take 650 mg by mouth daily       lisinopril (PRINIVIL/ZESTRIL) 10 MG tablet Take 1 tablet (10 mg) by mouth daily at night       moxifloxacin (VIGAMOX) 0.5 % ophthalmic solution Place 1 drop Into the left eye daily as needed (eye infections)        apixaban ANTICOAGULANT (ELIQUIS) 2.5 MG tablet Take 2.5 mg by mouth 2 times daily  90 tablet 1     brimonidine (ALPHAGAN-P) 0.15 % ophthalmic solution Place 1 drop into both eyes 2 times daily        pravastatin (PRAVACHOL) 20 MG tablet Take 1 tablet (20 mg) by mouth daily 30 tablet 11     calcium-vitamin D (CALTRATE) 600-400 MG-UNIT per tablet Take 1 tablet by mouth daily        latanoprost (XALATAN) 0.005 % ophthalmic solution Place 1 drop into both eyes At Bedtime       timolol (TIMOPTIC) 0.5 % ophthalmic solution Place 1 drop into both eyes 2 times daily       TRAMADOL HCL PO Take 50 mg by mouth At Bedtime Patient takes scheduled       multivitamin, therapeutic with minerals (MULTI-VITAMIN) TABS Take 1 tablet by mouth daily.       order for DME Equipment being ordered: Walker Wheels () and Walker ()  Treatment Diagnosis: difficulty with gait 1 each 0       ALLERGIES   No Known Allergies    PAST MEDICAL HISTORY:  Past Medical History    Diagnosis Date     Back pain      Syncope      CAD (coronary artery disease)      nonobstructive     HTN (hypertension)      Dyslipidemia      Osteoporosis      Glaucoma      Sinus node dysfunction (H)      sp DDD PM     Carotid stenosis      left side     Pulmonary hypertension (H)      moderate to severe     Paroxysmal atrial fibrillation (H) 9/12/14     PM detected, <1 minute each time     Aortic stenosis      moderate to severe     Chronic diastolic CHF (congestive heart failure) (H)        PAST SURGICAL HISTORY:  Past Surgical History   Procedure Laterality Date     Gyn surgery       OVARIAN CYSTECTOMY     Ent surgery       TONSILLECTOMY/BILAT EAR SURGERY     Eye surgery       BILAT CATARACT-IOL     Dilation and curettage, hysteroscopy diagnostic, combined  1/16/2013     Procedure: COMBINED DILATION AND CURETTAGE, HYSTEROSCOPY DIAGNOSTIC;   DILATION AND CURETTAGE, HYSTEROSCOPY;  Surgeon: Ozzie Lo MD;  Location: [facility]     Implant pacemaker       dual chamber       FAMILY HISTORY:  Family History   Problem Relation Age of Onset     Unknown/Adopted Mother      Myocardial Infarction Father      Heart Surgery Father        SOCIAL HISTORY:  Social History     Social History     Marital Status:      Spouse Name: N/A     Number of Children: N/A     Years of Education: N/A     Social History Main Topics     Smoking status: Never Smoker      Smokeless tobacco: None     Alcohol Use: Yes      Comment: OCC     Drug Use: No     Sexual Activity: Not Asked     Other Topics Concern     Caffeine Concern No     2-3 cups decaf coffee per day     Sleep Concern No     Weight Concern Yes     weight loss     Special Diet No     Exercise Yes     exercises at home     Social History Narrative       Review of Systems:  Skin:  Negative     Eyes:  Negative    ENT:  Negative    Respiratory:  Positive for dyspnea on exertion  Cardiovascular:    Positive for;exercise intolerance;fatigue  Gastroenterology: Negative   "  Genitourinary:  Positive for urinary frequency  Musculoskeletal:  Positive for back pain  Neurologic:  Positive for    Psychiatric:  Negative    Heme/Lymph/Imm:  Negative for bleeding disorder  Endocrine:  Negative      Physical Exam:  Vitals: /62 mmHg  Pulse 60  Ht 1.549 m (5' 1\")  Wt 52.889 kg (116 lb 9.6 oz)  BMI 22.04 kg/m2  SpO2 95%   Please refer to dictation for physical exam    Recent Lab Results:  LIPID RESULTS:  No results found for: CHOL, HDL, LDL, TRIG, CHOLHDLRATIO    LIVER ENZYME RESULTS:  Lab Results   Component Value Date    AST 44 01/20/2017    ALT 44 01/20/2017       CBC RESULTS:  Lab Results   Component Value Date    WBC 9.3 01/20/2017    RBC 4.11 01/20/2017    HGB 13.7 01/20/2017    HCT 40.7 01/20/2017    MCV 99 01/20/2017    MCH 33.3* 01/20/2017    MCHC 33.7 01/20/2017    RDW 14.9 01/20/2017     01/20/2017       BMP RESULTS:  Lab Results   Component Value Date    * 01/27/2017    POTASSIUM 4.4 01/27/2017    CHLORIDE 96* 01/27/2017    CO2 33* 01/27/2017    ANIONGAP 9.4 01/27/2017    GLC 97 01/27/2017    BUN 23 01/27/2017    CR 1.30 01/27/2017    GFRESTIMATED 39* 01/27/2017    GFRESTBLACK 47* 01/27/2017    CARLO 9.9 01/27/2017        A1C RESULTS:  No results found for: A1C    INR RESULTS:  Lab Results   Component Value Date    INR 1.17* 12/08/2016    INR 1.08 08/06/2011           CC  MD RENETTA [facility] MANAGEMENT [address]  [address]  [place] [address]        "

## 2017-01-27 NOTE — PROGRESS NOTES
Quick Note:    Reviewed during clinic visit. Please see progress note for plan. Maday Diaz PA-C 1/27/2017 3:36 PM        ______

## 2017-01-27 NOTE — TELEPHONE ENCOUNTER
TELEMANAGEMENT: Wt 113#, down 1# from yesterday, below min wt parameters with SOB reported. Pt has OV today with SMore for hospital follow up. Will review in clinic. ZULLY Stafford

## 2017-01-27 NOTE — PROGRESS NOTES
Clinic Care Coordination Contact  [facility] Care Coordination Outreach    Patient was enrolled in [facility] upon Discharged from: Hospital    Program Type: Heart Failure    Program Length: 30 days post discharge    Clinical Data:  Outreach Type: Other (Comment) ([facility] clinic is managing pt)    Action: Actions: N/A

## 2017-01-27 NOTE — PATIENT INSTRUCTIONS
"  Call [facility] nurse for any questions or concerns:    Shyanne Vargas, or Radha: [phone]   If you have concerns after hours, please call [phone], option 2    1. Medication changes:  Continue current medications.      2. Weigh yourself daily and write it down.  We will consider your \"dry weight\" 110-115 pounds.       3. Call [facility] nurse if your weight is up more than 2 pounds in one day, or 5 pounds in one week.    4. Call [facility] nurse if you feel more short of breath, have more abdominal bloating or leg swelling.    5. Continue low sodium diet (less than 2000mg daily). If you eat less salt, you will retain less fluid.     6. Lab results: These overall look good.    Component      Latest Ref Rng 1/27/2017   Sodium      136 - 145 mmol/L 134 (L)   Potassium      3.5 - 5.1 mmol/L 4.4   Chloride      98 - 107 mmol/L 96 (L)   Carbon Dioxide      23 - 29 mmol/L 33 (H)   Anion Gap      6 - 17 mmol/L 9.4   Glucose      70 - 105 mg/dL 97   Urea Nitrogen      7 - 30 mg/dL 23   Creatinine      0.70 - 1.30 mg/dL 1.30   GFR Estimate      >60 mL/min/1.7m2 39 (L)   GFR Estimate If Black      >60 mL/min/1.7m2 47 (L)   Calcium      8.5 - 10.5 mg/dL 9.9     **Do NOT take Aleve or Ibuprofen without checking with your doctor first    [facility]: Cardiomyopathy, Optimization, Rehabilitation, Education   The [facility] is a heart failure specialty clinic within the [facility] where you will work with specialized nurse practioners, physician assistants, doctors and registered nurses. They are dedicated to helping patients with heart failure to carefully adjust medications, receive education, and learn who and when to call if symptoms develop. They specialize in helping you better understand your condition, slow the progression of your disease, improve the length and quality of your life, help you detect future heart problems before they become life threatening, and avoid " hospitalizations.

## 2017-01-28 NOTE — PROGRESS NOTES
PRIMARY CARDIOLOGIST:  Dr. Warren, seen by Dr. White in the hospital.      REASON FOR VISIT:  Hospital followup, [facility] enrollment.      HISTORY OF PRESENT ILLNESS:  Ms. Helms is a delightful 88-year-old woman with past cardiac history significant for the followin.  Paroxysmal atrial fibrillation with anticoagulation.   2.  Sinus node dysfunction with permanent pacemaker in place.   3.  Hypertension.   4.  Dyslipidemia.   5.  Carotid artery stenosis.   6.  Moderate to severe aortic stenosis.   7.  Hypertension.      She has had a pretty rough couple of months.  She was seen by Dr. Warren in the beginning of December and had described some chest discomfort.  She went on to have a stress test.  This was negative with a normal EF.  Over the fall she was considered to potentially have severe AS.  This was reviewed by the TAVR team and was felt to be moderate to severe.  She had an episode of acute pulmonary edema when she got off the bus at [employer] and felt severely short of breath.  She was found to be in AFib with RVR.  This combined with her moderate to severe AS may have contributed to this.  She was discharged home and then followed with JD Chakraborty and had difficulty throughout December describing spells where she had the feeling of doom, some chest discomfort, some breathing problems, some throat discomfort.  She had an ER visit for this on , continued to feel it into early January in spite of adjustments by Elisabeth Chakraborty to her medications.  She was finally admitted again on .  There was again a discussion whether her valve was worse and possible worse than expected, and her weight had gone up to 128 pounds.  It was felt that she had heart failure with preserved EF.  Again, she had AFib with RVR, and her metoprolol-XL, which had been 100 mg and had been cut back to 50 mg, was then put to 75 mg twice a day.  She was diuresed down to a hospital weight of about 120.  She was discharged home.       She comes in today for followup of this.  She was just discharged 3 days ago.  Her weight at home has been stable at 113-114 pounds.  She feels like she is getting better every day.  She can walk all the way down her hallway to the elevator without stopping.  Prior to this, she sometimes had to stop and she was would not want to leave her apartment even to get her mail because she felt so poorly.  Her spells she continued to describe as feeling exhausted and weak and short of breath when she has them, but they have not happened since hospitalization.  She is trying to eat low-salt.  She noted her heart rate was a little bit faster this morning, but she did not feel it as palpitations, she felt it only when checking her pulse.  She denies orthopnea or PND.  She has no more pedal edema, this has resolved completely.      SOCIAL HISTORY:  She comes in today with her niece, Tavia, who is a HUC on fifth floor here, which is Orthopedics.  She has a daughter who lives in [place] who comes to visit every 6 weeks and calls daily.  She is also trying to help her out with foods.  She lives in [address] [place] [address] in a co-op.      PHYSICAL EXAMINATION:   GENERAL:  Well-developed, well-nourished, well-dressed woman in no acute distress, appears her stated age.   HEENT:  Normocephalic, atraumatic.   HEART:  Regular with a 3/6 systolic murmur heard at the right sternal border and throughout the precordium.   LUNGS:  Diminished bilaterally but no wheezes, rales or rhonchi.   EXTREMITIES:  Without peripheral edema, 2 cm of JVD above the sternal notch with minimal hepatojugular reflux.      ASSESSMENT AND PLAN:    1.  Recent admission for acute heart failure with preserved EF with her home dry weight now probably right around 113-114 pounds.  I am fine if this goes up for caloric reasons, although I do think she is euvolemic at this point.  This begs the question that if she had episodes of AFib with RVR, did they cause  pulmonary edema intermittently or other feelings of profound fatigue in the context of also moderate to severe aortic stenosis.  At this point, we will try to keep her in this weight range and keep her euvolemic and see if we can prevent both these feelings of doom and readmissions for heart failure.  Her blood pressure today is stable at 116/62, and she does not describe any orthostatic features.  At this point, we will continue her Lasix at 40 mg daily, Toprol 75 mg b.i.d. and her lisinopril at 10.  She likely has some right heart failure, and at some point we should consider adding spironolactone in this patient.  At this point, though, this is the best she has felt in a long time, and I do not want to make adjustments that may worsen her.   2.  Permanent pacemaker in place with known AFib with RVR, on Eliquis for anticoagulation at 2.5 dose given age and weight.   3.  Hypertension, well controlled.   4.  Moderate to severe aortic stenosis.  This apparently has been reviewed by Dr. Parker to be evaluated for its severity and is currently felt to be moderate to severe.  Her valve area is small, and there is a question if this is a low-flow, low-gradient, normal EF, aortic stenosis.  I think this can be reassessed, as she maintains euvolemia.  She certainly would be to have TAVR candidate if it needs to be replaced.      Thank you for allowing me to participate in this delightful patient's care.  She has followup scheduled with Dr. White in 2 weeks already.  We will be following her on tele-assurance between now and then.      SULEMAN Smith PA-C             D: 2017 16:45   T: 2017 00:24   MT: CARLO      Name:     ANTHONY KEMP   MRN:      [id number]        Account:      [id number]   :      1928           Service Date: 2017      Document: [id number]

## 2017-01-30 NOTE — TELEPHONE ENCOUNTER
"Writer returned phone call to Roma from [facility], whom states seen pt today in [facility] clinic and pt c/o \"profound SOB x one hour, accompanied by nausea\" last evening. No c/o chest pain. No CHF per [facility] assessment today. Requested pt send in a Lat[employer] transmission to see if any arrhythmias have occurred during this time. JORGE LUIS Jones RN.  "

## 2017-01-30 NOTE — TELEPHONE ENCOUNTER
Called pt to review. Phone busy. Will try again later. Placed order for echo. Will ask scheduling to coordinate imaging prior to 2/10 OV w/ Dr. White.

## 2017-01-30 NOTE — TELEPHONE ENCOUNTER
"Telemanagement: Pt has reported SOB several times since starting telemgmt, including today. Wt stable and w/in range. Spoke w/ pt and she said she nearly called 911 last night d/t \"spell.\" She describes a profound feeling of breathlessness, nausea and \"a strange feeling in my chest\" while getting ready for bed, lasting 45 minutes. She couldn't say it was chest pain or heart racing specifically. Now she says she feels completely normal. I told her she should call 911 if this happens again and that I'd review w/ [facility] PAC and call her back. She has hx of AFib, severe AS, HF.  I asked her to send in a remote device check, alerted device nurse.      Next OV/ labs 2/10 w/ Dr. White. She has a [facility] nurse visit today.     "

## 2017-01-30 NOTE — TELEPHONE ENCOUNTER
Unfortunately these sound the similar episodes she had prior to admission.  I was hopeful that if she was more euvolemic they would happen less.  Will await device check for further recs.  Maday Diaz PA-C 1/30/2017 12:25 PM

## 2017-01-30 NOTE — TELEPHONE ENCOUNTER
"Patient sent remote PPM check to assess for anything abnormal last night prior to going to bed. Patient went into A-fib at 0225 on the 30th and remains in it. Her \"event\" occurred prior to her going into AFIB so does not seem to correlate with this episode as she reports feeling fine at this time despite the fact she is in AF. She has know PAF and is on AC. Heart rates were/are variable with rates somewhat elevated (last 39 days show) rates > 100 BPM 40% of the time; SHe is in PAF 62% of the time; there are 44 episodes in past month of short bursts of RVR this is triggered at rates > 160 BPM. Her Metoprolol was recently increased from 50 BID to 75 mg BID at last hospital admit on 1/24. We will need to reset counters to get a better assessment of the response to this med change. Notified [facility] Rn of findings. ZULLY Cleveland  "

## 2017-01-31 NOTE — TELEPHONE ENCOUNTER
"Spoke w/ pt. Reviewed results of device transmission. She is now wondering if that was actually when her \"spell\" happened. She had previously told me that the episode was while getting ready for bed, and now she's is having a difficult time remembering, thinking it may be possible that it was during the night. Device check note says she went into AFib at 0225. She's now feeling well, although tired from PCP OV today, note not yet available in Care Everywhere. I asked her to call 911 if she has persistent feelings like previous. Route to [facility] PAC as update.    Connected her to scheduling to make echo appt.   "

## 2017-02-01 NOTE — TELEPHONE ENCOUNTER
"TELEMANAGEMENT: Wt 116# today, up 2# from 1/30/17 and now 1# above max wt parameter.  No symptoms reported on survey today.  She take lasix 40mg daily. Per Farheen's note on 1/27/17, \"Recent admission for acute heart failure with preserved EF with her home dry weight now probably right around 113-114 pounds.  I am fine if this goes up for caloric reasons, although I do think she is euvolemic at this point.\"     Called and spoke with pt who reports she is feeling well, denies increased SOB or swelling and states her wt typically fluctuates between 114 and 118#.  She confirms she is taking lasix 40mg daily.  Discussed with pt that will continue to monitor wt/sx with TELEMANAGEMENT and she agrees with this plan.  Pt has new [facility] appt scheuled with Dr. White and BMP on 2/10/17.          "

## 2017-02-02 NOTE — TELEPHONE ENCOUNTER
TELEMANAGEMENT: Wt 117#, up 1# from yesterday, now up 3# in the last 3 days, 2# above max wt parameters with no symptoms reported. Pt takes Lasix 40mg daily. Next OV 2/10 with Dr. White and BMP. Called pt to review, no answer/unable to leave message. Will try back. ZULLY Stafford

## 2017-02-02 NOTE — TELEPHONE ENCOUNTER
Called pt, she states she finally feels a little better today. She says she feels the best today that she has since December. Pt states she hasn't really much of an appetite. She eats most meals in her apartment and does go down to dining mix twice per week. Yesterday she had homemade low salt tomato soup and then she did have a cheese sandwich yesterday. Pt denies SOB and swelling. She states her legs and ankles look really good. Pt has Echo, lab, and OV with Dr. White next week. Will send to Bradly Stafford RN

## 2017-02-02 NOTE — TELEPHONE ENCOUNTER
Glad she feels better, please adjust wt parameters to 112-118, will follow closely. Maday Diaz PA-C 2/2/2017 2:45 PM

## 2017-02-07 NOTE — TELEPHONE ENCOUNTER
I don't have much more insight into these spells without any new information about her condition at the time. Maybe we need to know her HR and BP at the time of the event. For now, we should give her an extra dose of diuretic to keep her weight down near 116 where she felt best.   EE

## 2017-02-07 NOTE — TELEPHONE ENCOUNTER
"TELEMANAGEMENT: Wt 119#, up 1# from yesterday, now 1# above max wt parameters with SOB and dizziness reported. Pt takes Lasix 40 daily. Pt has Echo Thursday 2/9 and OV and BMP with Dr. White Friday 2/10 this week. Called pt, she is feeling well now, but had another one of her episodes last night about 8:30pm. Pt states she suddenly became SOB, dizzy, and just felt unwell. Pt states it lasted about 45 minutes and then went away. Pt states it was pretty much the same feeling as the episodes she has had in the past. Pt had one of these last week, we had her send a remote device check (dated 1/30), pt had gone into Afib around 2:25am that night, but her \"episode\" happened hours before that, Afib did not correlate with symptoms, wt that day was 114#. Pt explained that her episodes are almost like seizures to her, because they come and go randomly, last about 45 min and then she is fine. Pt's wt is up 5-6# since DC from the hospital. Pt was feeling the best she has in months at about 116# at home. SMore out of the office. Will message Dr. White for review. ZULLY Stafford     "

## 2017-02-07 NOTE — TELEPHONE ENCOUNTER
Called pt and discussed, she will take an extra 20mg or 1/2 tablet of Lasix now. Will check for wt update in am. ZULLY Stafford

## 2017-02-08 NOTE — TELEPHONE ENCOUNTER
Increase lasix to 40 mg bid until wt is 116 or lower.  Have her wear a 72 hour holter so we can catch episodes that may not be detected on pacemaker.  Reviewed with ep nursing- unlikely that there are rhythms falling outside detection zones, but given severity of sx worth having 72 hours of data to review.   Maday Diaz PA-C 2/8/2017 11:56 AM

## 2017-02-08 NOTE — TELEPHONE ENCOUNTER
Re-reviewed with Farheen, she spoke with Soila, device RN. Cancel monitor, will have pt stop down after Echo tomorrow and have device check. Called pt and reviewed. ZULLY Stafford

## 2017-02-08 NOTE — PROGRESS NOTES
"Rebecca RN calling to leave a homecare update-(PH# [phone])-she was out to see pt today, pt did end up back in the hospital W/ CHF-and has been referred to the [facility]-she is having Echo and a device check 2-9-2017, and  a LAB and New CORE visit w/ Dr White 2-.pts wt @ home has been 120#, she has abdominal fluid, that has not changed over the last month. Pt had some c/o  SOB at night on Monday 2-6-2017, but has not c/o SOB since the nurse came out to see her.Some anxiety as well. Pt is described by HC RN as \"fragile\", homecare RN did bring up the subject of looking into Hospice, but pt declined at this time. HC RN said \" I think she has a different Idea of what hospice is and what they can offer\". VSS : b/p-104/72, pulse 84 irregular, o2 sats are 98% on room air.-message to [facility] RN-mmunns lpn  "

## 2017-02-08 NOTE — TELEPHONE ENCOUNTER
TELEMANAGEMENT: Wt 120#, up another 1# from yesterday, and still above max wt parameters with SOB, swelling, and dizziness. Pt takes Lasix 40mg daily. Per Dr. White, pt took an extra 1/2 tablet or 20mg Lasix yesterday. Called pt, she verified she did take extra 1/2 tablet of Lasix. She had another episode of nausea/dizziness/SOB last evening while down at [employer], again lasted about 40 minutes. Pt still somewhat nauseated today and not feeling that well. Will review with Farheen and Dr. White. ZULLY Stafford

## 2017-02-08 NOTE — PROGRESS NOTES
Please see phone notes this date.  We are working diligently to improve sx, echo tomorrow and clinic ppm check.  Maday Diaz PA-C 2/8/2017 4:54 PM

## 2017-02-08 NOTE — TELEPHONE ENCOUNTER
Called pt and reviewed, she verbalized understanding. Had her read back instructions and she did and stated understanding. Pt agreeable to wear monitor. Told pt we can place it tomorrow after she is done with her Echo. She stated understanding. ZULLY Stafford

## 2017-02-09 NOTE — PROGRESS NOTES
[employer] (D) PPM COURTESY CHECK- NO CHARGE  Patient in to have counters cleared- she will be given new medication per Dr White tomorrow and we will re assess the heart rate response. Since last checked on Dec 16th her heart rate has been 100-150 BPM 45 % of the time. ( these are her own sensed ventricular rates.) She has been in A fib or some atrial arrhythmia (numerous AT rates) 65% of the time. She is A-pacing 3% and V-pacing 9 % of the time. The mode switch rate was also dropped from 170 to 150 BPM. ZULLY [place]

## 2017-02-09 NOTE — TELEPHONE ENCOUNTER
checking in patient reported patient exhibiting shortness of breath.  Patient here for ECHO appt.  Assessed patient who was sitting in the waiting area.  Patient calm sitting with coat on.  Did report she was dropped off for her appt and walked a distance and felt winded after first arriving to appt site however after sitting for a bit she felt  fine.  Respirations 18 no distress noted.  Ok for patient to proceed to have ECHO done.  Patient recently diagnosed with diastolic CHF and is being closely monitored.  Reported above to Alicia ANDREA in [facility].

## 2017-02-09 NOTE — PROGRESS NOTES
Quick Note:    Reviewed during clinic visit. Please see progress note for plan. Maday Diaz PA-C 2/9/2017 3:54 PM        ______

## 2017-02-10 NOTE — TELEPHONE ENCOUNTER
TELEMANAGEMENT: Wt 120#, same as yesterday, still above max wt parameters with noc SOB and dizziness reported. Pt taking Lasix 40mg BID. Pt seeing Dr. White this am. TELEMANAGEMENT printed and given to Dr. White for review. ZULLY Stafford

## 2017-02-10 NOTE — PROGRESS NOTES
HPI and Plan:   See dictation    Orders Placed This Encounter   Procedures     Comprehensive metabolic panel     Follow-Up with Electrophysiologist     Follow[facility] with [facility]       Orders Placed This Encounter   Medications     metoprolol (TOPROL-XL) 25 MG 24 hr tablet     Sig: Take 3 tablets (75 mg) by mouth daily     Dispense:  180 tablet     Refill:  3     furosemide (LASIX) 40 MG tablet     Sig: Take 1 tablet (40 mg) by mouth daily And  as directed by [facility]     Dispense:  100 tablet     Refill:  3       Medications Discontinued During This Encounter   Medication Reason     metoprolol (TOPROL-XL) 25 MG 24 hr tablet Reorder     furosemide (LASIX) 40 MG tablet Reorder         Encounter Diagnoses   Name Primary?     Acute diastolic congestive heart failure (H) Yes     Persistent atrial fibrillation (H)      SSS (sick sinus syndrome) (H)      Cardiac pacemaker in situ      Severe tricuspid regurgitation      Nonrheumatic aortic valve stenosis      Non-rheumatic mitral regurgitation        CURRENT MEDICATIONS:  Current Outpatient Prescriptions   Medication Sig Dispense Refill     metoprolol (TOPROL-XL) 25 MG 24 hr tablet Take 3 tablets (75 mg) by mouth daily 180 tablet 3     furosemide (LASIX) 40 MG tablet Take 1 tablet (40 mg) by mouth daily And  as directed by [facility] 100 tablet 3     acetaminophen (TYLENOL ARTHRITIS PAIN) 650 MG CR tablet Take 650 mg by mouth daily       lisinopril (PRINIVIL/ZESTRIL) 10 MG tablet Take 1 tablet (10 mg) by mouth daily at night       order for DME Equipment being ordered: Walker Wheels () and Walker ()  Treatment Diagnosis: difficulty with gait 1 each 0     moxifloxacin (VIGAMOX) 0.5 % ophthalmic solution Place 1 drop Into the left eye daily as needed (eye infections)        apixaban ANTICOAGULANT (ELIQUIS) 2.5 MG tablet Take 2.5 mg by mouth 2 times daily  90 tablet 1     brimonidine (ALPHAGAN-P) 0.15 % ophthalmic solution Place 1 drop into both eyes 2 times  daily        pravastatin (PRAVACHOL) 20 MG tablet Take 1 tablet (20 mg) by mouth daily 30 tablet 11     calcium-vitamin D (CALTRATE) 600-400 MG-UNIT per tablet Take 1 tablet by mouth daily        latanoprost (XALATAN) 0.005 % ophthalmic solution Place 1 drop into both eyes At Bedtime       timolol (TIMOPTIC) 0.5 % ophthalmic solution Place 1 drop into both eyes 2 times daily       TRAMADOL HCL PO Take 50 mg by mouth At Bedtime Patient takes scheduled       multivitamin, therapeutic with minerals (MULTI-VITAMIN) TABS Take 1 tablet by mouth daily.       [DISCONTINUED] furosemide (LASIX) 40 MG tablet Take 1 tablet (40 mg) by mouth daily And  as directed by [facility] 100 tablet 3     [DISCONTINUED] metoprolol (TOPROL-XL) 25 MG 24 hr tablet Take 3 tablets (75 mg) by mouth 2 times daily (Patient taking differently: Take 50 mg by mouth daily ) 180 tablet 3       ALLERGIES   No Known Allergies    PAST MEDICAL HISTORY:  Past Medical History   Diagnosis Date     Back pain      Syncope      CAD (coronary artery disease)      nonobstructive     HTN (hypertension)      Dyslipidemia      Osteoporosis      Glaucoma      Sinus node dysfunction (H)      sp DDD PM     Carotid stenosis      left side     Pulmonary hypertension (H)      moderate to severe     Paroxysmal atrial fibrillation (H) 9/12/14     PM detected, <1 minute each time     Aortic stenosis      moderate to severe     Chronic diastolic CHF (congestive heart failure) (H)        PAST SURGICAL HISTORY:  Past Surgical History   Procedure Laterality Date     Gyn surgery       OVARIAN CYSTECTOMY     Ent surgery       TONSILLECTOMY/BILAT EAR SURGERY     Eye surgery       BILAT CATARACT-IOL     Dilation and curettage, hysteroscopy diagnostic, combined  1/16/2013     Procedure: COMBINED DILATION AND CURETTAGE, HYSTEROSCOPY DIAGNOSTIC;   DILATION AND CURETTAGE, HYSTEROSCOPY;  Surgeon: Ozzie Lo MD;  Location: [facility]     Implant pacemaker       dual chamber       FAMILY  "HISTORY:  Family History   Problem Relation Age of Onset     Unknown/Adopted Mother      Myocardial Infarction Father      Heart Surgery Father        SOCIAL HISTORY:  Social History     Social History     Marital Status:      Spouse Name: N/A     Number of Children: N/A     Years of Education: N/A     Social History Main Topics     Smoking status: Never Smoker      Smokeless tobacco: None     Alcohol Use: Yes      Comment: OCC     Drug Use: No     Sexual Activity: Not Asked     Other Topics Concern     Caffeine Concern No     2-3 cups decaf coffee per day     Sleep Concern No     Weight Concern Yes     weight loss     Special Diet No     Exercise Yes     exercises at home     Social History Narrative       Review of Systems:  Skin:  Negative       Eyes:  Positive for glasses (reading only)    ENT:  Negative      Respiratory:  Positive for dyspnea on exertion (with walking)     Cardiovascular:  Negative Positive for;chest pain;edema;dizziness    Gastroenterology: Positive for nausea    Genitourinary:  Positive for      Musculoskeletal:  Positive for back pain    Neurologic:  Positive for   Neuropathy  Psychiatric:  Negative      Heme/Lymph/Imm:  Negative for      Endocrine:  Negative        Physical Exam:  Vitals: /91 mmHg  Pulse 59  Ht 1.549 m (5' 1\")  Wt 56.337 kg (124 lb 3.2 oz)  BMI 23.48 kg/m2    Constitutional:  cooperative, alert and oriented, well developed, well nourished, in no acute distress        Skin:  warm and dry to the touch, no apparent skin lesions or masses noted        Head:  normocephalic, no masses or lesions        Eyes:  pupils equal and round;conjunctivae and lids unremarkable;sclera white        ENT:  no pallor or cyanosis, dentition good        Neck:  JVP normal transmitted murmur      Chest:  normal breath sounds, clear to auscultation, normal A-P diameter, normal symmetry, normal respiratory excursion, no use of accessory muscles   pacemaker incision in the left " infraclavicular area was well-healed      Cardiac: regular rhythm       systolic murmur          Abdomen:  abdomen soft        Vascular: pulses full and equal                                        Extremities and Back:  no deformities, clubbing, cyanosis, erythema observed   bilateral LE edema;1+     foot    Neurological:  affect appropriate, oriented to time, person and place              CC  MD RENETTA [facility] [place] [id number]     [place] [address]

## 2017-02-10 NOTE — PROGRESS NOTES
February 10, 2017             Ren Gipson MD    [facility]   [address] [place] [address]      RE:    Rebecca Helms   MRN:  [id number]   :  1928      Dear Dr. Gipson:      I had the opportunity to see Ms. Rebecca Helms in Cardiology Clinic today at the [facility] in [place] for reevaluation of recent problems with diastolic heart failure.        As you may recall, Ms. Helms is a jhoana 88-year-old woman who has a permanent pacemaker implantation for treatment of sinus node dysfunction and syncope.  She also has paroxysmal atrial fibrillation and remains on anticoagulation for stroke prevention.  She was found to have moderately severe aortic stenosis with low mean gradient due to low stroke volume and has undergone some discussion about transcatheter aortic valve replacement.  She also has moderately severe mitral regurgitation and moderately severe tricuspid regurgitation based on her most recent echocardiogram done yesterday.  Her left ventricular function remains normal with an ejection fraction of 55%-60%.      I saw her when she was hospitalized at [facility] recently.  She came in to the hospital after an episode of acute pulmonary edema that began with activity while she was taking a field trip to the [employer].  Just with feel a little bit of walking involved from her transportation to the building, she developed acute pulmonary edema and required an ambulance to transport her to [facility].  With aggressive diuresis and heart rate control, her rapid atrial fibrillation and heart failure issues were managed medically and she was discharged here for followup.        Unfortunately, she continues to have episodes that sound like sudden onset of pulmonary edema or congestive heart failure.  They have not been quite as severe as the one she had that required hospitalization, but with any activity, she develops sudden onset of shortness  of breath and chest pressure associated with nausea and lightheadedness.  She has had similar episodes dating back for months, but these seem to be getting more frequent and are occurring with less activity.  She tells me that she had an episode that was more prolonged and severe when she tried to walk the length of the hallway in her building to attend a birthday party.  Another episode occurred that was less severe and less prolonged when she was simply changing out the garbage bag in her wastebasket.        We had increased her metoprolol to 150 mg a day, splitting that into 75 mg in the morning and 75 mg at night.  However, she got confused about the dose in was only taking 50 mg once a day.  She came in for pacemaker check yesterday and her heart rates were clearly too high.  Her heart rates were between 100 and 150 beats per minute 45% of the time since 12/16.  She has been in atrial fibrillation 65% of that time and pacing minimally.  Most of this seems to be native conduction through her AV node of her atrial fibrillation with rapid ventricular response.      She indicates to me that she does not want to be too aggressive with anything.  She realizes that she is 88 years old and just wants to be comfortable and not necessarily extend her life.      On examination today, her blood pressure 115/91, heart rate 59 and weight 124 pounds.     We are following her on our tele-management program and her weight is up about 6 pounds since she was discharged from the hospital despite our attempts to increase her Lasix dosing.  I checked her labs today including a potassium which was 4.7 and a creatinine which was up from 1.3 now to 1.59.  Her lungs are clear at the moment and she is resting comfortably and breathing easily.  Her heart rhythm is irregular with a systolic murmur at the base and at the apex as well.   She has 1+ bilateral ankle edema.      IMPRESSION:  Ms. Rebecca Helms is an 88-year-old woman with the  following issues:   1.  Chronic diastolic heart failure with frequent acute heart failure/pulmonary edema episodes occurring with any level of exertion.   2.  Aortic stenosis which is thought to be moderately severe with a low gradient due to low stroke volume.  We have discussed possible TAVR procedure, but this was deferred because it was not clear that her aortic stenosis was severe.   3.  Moderately severe mitral regurgitation.   4.  Moderately severe tricuspid regurgitation.   5.  Paroxysmal atrial fibrillation with frequent episodes of rapid heart rates.  Some of this may be due to underdosing of her metoprolol.  I think she misunderstood how much metoprolol was recommended.  However, her blood pressures tend to run low and I am not confident that we can increase the metoprolol as aggressively as we need to control her heart rates during these episodes.   6.  Mild chronic kidney disease which looks a little bit worse at the moment, probably due to more recent aggressive diuresis.      This is a challenging set of problems including episodes of rapid atrial fibrillation, aortic stenosis and significant mitral regurgitation.  We could address any one of these issues, specifically and independently to try to help.  I think the simplest way to address this would be to try to control her heart rates better.  Since she already has a permanent pacemaker, I think it would be a fairly simple approach to this problem to simply do an AV node ablation and reprogram her pacemaker to accomplish better heart rate control.  If she continues to have recurrent episodes of acute diastolic heart failure after that maneuver, I would suggest that we consider transcatheter aortic valve replacement (TAVR) and then maybe then eventually consider mitral valve clip procedure, all depending on her symptoms.        As I mentioned above, she primarily just once she remained comfortable and be able to go to [employer] on Sundays.  She is not  looking to extend her life significantly.  I will continue to have her follow up with us in [facility] for management of her congestive heart failure.  I will not increase her diuretics today due to her rising creatinine.      Sincerely,         Luis White MD, F.A.C.C.         LUIS WHITE MD, [facility]             D: 02/10/2017 11:44   T: 02/10/2017 13:03   MT: JESSICA      Name:     ANTHONY KEMP   MRN:      [id number]        Account:      [id number]   :      1928           Service Date: 02/10/2017      Document: [id number]

## 2017-02-10 NOTE — MR AVS SNAPSHOT
After Visit Summary   2/10/2017    Rebecca Helms    MRN: [id number]           Patient Information     Date Of Birth          6/18/1928        Visit Information        Provider Department      2/10/2017 10:15 AM Luis Wihte MD [facility]        Today's Diagnoses     Acute diastolic congestive heart failure (H)    -  1     Persistent atrial fibrillation (H)         SSS (sick sinus syndrome) (H)         Cardiac pacemaker in situ         Severe tricuspid regurgitation         Nonrheumatic aortic valve stenosis         Non-rheumatic mitral regurgitation            Follow-ups after your visit        Additional Services     Follow-Up with [facility]           Follow-Up with Electrophysiologist                 Your next 10 appointments already scheduled     Mar 09, 2017 11:30 AM   LAB with [facility]   [facility] ([facility])    [address] [place] [address] [address]  [facility] [id number] [phone]           Patient must bring picture ID.  Patient should be prepared to give a urine specimen  Please do not eat 10-12 hours before your appointment if you are coming in fasting for labs on lipids, cholesterol, or glucose (sugar).  Pregnant women should follow their Care Team instructions. Water with medications is okay. Do not drink coffee or other fluids.   If you have concerns about taking  your medications, please ask at office or if scheduling via [employer], send a message by clicking on Secure Messaging, Message Your Care Team.            Mar 09, 2017 12:30 PM   Return Visit with Lisbet Chakraborty PA-C   [facility] ([facility])    [address] [place] [address] [address]  [facility] [id number] [phone]            Mar 09, 2017  2:00 PM   Remote PPM Check with [facility] TECH1   [facility] ([facility])    [address] [place] [address]  "[address]  [place] [address]   [phone]           This appointment is for a remote check of your pacemaker.  This is not an appointment at the office.              Future tests that were ordered for you today     Open Future Orders        Priority Expected Expires Ordered    Comprehensive metabolic panel Routine 2/24/2017 2/10/2018 2/10/2017    Follow-Up with [facility] Routine 2/17/2017 2/10/2018 2/10/2017    Follow-Up with Electrophysiologist Routine 2/17/2017 2/10/2018 2/10/2017            Who to contact     If you have questions or need follow up information about today's clinic visit or your schedule please contact [facility] [place] directly at [phone].  Normal or non-critical lab and imaging results will be communicated to you by MyChart, letter or phone within 4 business days after the clinic has received the results. If you do not hear from us within 7 days, please contact the clinic through [employer] or phone. If you have a critical or abnormal lab result, we will notify you by phone as soon as possible.  Submit refill requests through [employer] or call your pharmacy and they will forward the refill request to us. Please allow 3 business days for your refill to be completed.          Additional Information About Your Visit        [employer] Information     [employer] gives you secure access to your electronic health record. If you see a primary care provider, you can also send messages to your care team and make appointments. If you have questions, please call your primary care clinic.  If you do not have a primary care provider, please call [phone] and they will assist you.        Care EveryWhere ID     This is your Care EveryWhere ID. This could be used by other organizations to access your [facility] medical records  [id number]        Your Vitals Were     Pulse Height BMI (Body Mass Index)             59 1.549 m (5' 1\") 23.48 kg/m2          Blood Pressure from " Last 3 Encounters:   02/10/17 115/91   01/27/17 116/62   01/24/17 140/73    Weight from Last 3 Encounters:   02/10/17 56.337 kg (124 lb 3.2 oz)   01/27/17 52.889 kg (116 lb 9.6 oz)   01/23/17 56.3 kg (124 lb 1.9 oz)                 Today's Medication Changes          These changes are accurate as of: 2/10/17 11:15 AM.  If you have any questions, ask your nurse or doctor.               These medicines have changed or have updated prescriptions.        Dose/Directions    metoprolol 25 MG 24 hr tablet   Commonly known as:  TOPROL-XL   This may have changed:  when to take this   Used for:  Acute diastolic congestive heart failure (H), Persistent atrial fibrillation (H)   Changed by:  Luis White MD        Dose:  75 mg   Take 3 tablets (75 mg) by mouth daily   Quantity:  180 tablet   Refills:  3            Where to get your medicines      These medications were sent to [facility] [address] [place] [address] [address] [address]    Hours:  24-hours Phone:  [phone]    - furosemide 40 MG tablet  - metoprolol 25 MG 24 hr tablet             Primary Care Provider Office Phone # Fax #    Ren Gipson [phone]       RENETTA [facility] MANAG[facility] [address]  [address]  [facility] [address]        Thank you!     Thank you for choosing [facility]  for your care. Our goal is always to provide you with excellent care. Hearing back from our patients is one way we can continue to improve our services. Please take a few minutes to complete the written survey that you may receive in the mail after your visit with us. Thank you!             Your Updated Medication List - Protect others around you: Learn how to safely use, store and throw away your medicines at www.disposemymeds.org.          This list is accurate as of: 2/10/17 11:15 AM.  Always use your most recent med list.                   Brand Name  Dispense Instructions for use    apixaban ANTICOAGULANT 2.5 MG tablet    ELIQUIS    90 tablet    Take 2.5 mg by mouth 2 times daily       brimonidine 0.15 % ophthalmic solution    ALPHAGAN-P     Place 1 drop into both eyes 2 times daily       calcium-vitamin D 600-400 MG-UNIT per tablet    CALTRATE     Take 1 tablet by mouth daily       furosemide 40 MG tablet    LASIX    100 tablet    Take 1 tablet (40 mg) by mouth daily And  as directed by [facility]       latanoprost 0.005 % ophthalmic solution    XALATAN     Place 1 drop into both eyes At Bedtime       lisinopril 10 MG tablet    PRINIVIL/ZESTRIL     Take 1 tablet (10 mg) by mouth daily at night       metoprolol 25 MG 24 hr tablet    TOPROL-XL    180 tablet    Take 3 tablets (75 mg) by mouth daily       moxifloxacin 0.5 % ophthalmic solution    VIGAMOX     Place 1 drop Into the left eye daily as needed (eye infections)       Multi-vitamin Tabs tablet      Take 1 tablet by mouth daily.       order for DME     1 each    Equipment being ordered: Walker Wheels () and Walker () Treatment Diagnosis: difficulty with gait       pravastatin 20 MG tablet    PRAVACHOL    30 tablet    Take 1 tablet (20 mg) by mouth daily       timolol 0.5 % ophthalmic solution    TIMOPTIC     Place 1 drop into both eyes 2 times daily       TRAMADOL HCL PO      Take 50 mg by mouth At Bedtime Patient takes scheduled       TYLENOL ARTHRITIS PAIN 650 MG CR tablet   Generic drug:  acetaminophen      Take 650 mg by mouth daily

## 2017-02-10 NOTE — LETTER
February 10, 2017          Ren Gipson MD    [facility]   [address] [place] [address]      RE:    Rebecca Helms   MRN:  [id number]   :  1928      Dear Dr. Gipson:      I had the opportunity to see Ms. Rebecca Helms in Cardiology Clinic today at the [facility] in [place] for reevaluation of recent problems with diastolic heart failure.        As you may recall, Ms. Helms is a jhoana 88-year-old woman who has a permanent pacemaker implantation for treatment of sinus node dysfunction and syncope.  She also has paroxysmal atrial fibrillation and remains on anticoagulation for stroke prevention.  She was found to have moderately severe aortic stenosis with low mean gradient due to low stroke volume and has undergone some discussion about transcatheter aortic valve replacement.  She also has moderately severe mitral regurgitation and moderately severe tricuspid regurgitation based on her most recent echocardiogram done yesterday.  Her left ventricular function remains normal with an ejection fraction of 55%-60%.      I saw her when she was hospitalized at [facility] recently.  She came in to the hospital after an episode of acute pulmonary edema that began with activity while she was taking a field trip to [place] [employer].  Just with feel a little bit of walking involved from her transportation to the building, she developed acute pulmonary edema and required an ambulance to transport her to [facility].  With aggressive diuresis and heart rate control, her rapid atrial fibrillation and heart failure issues were managed medically and she was discharged here for followup.        Unfortunately, she continues to have episodes that sound like sudden onset of pulmonary edema or congestive heart failure.  They have not been quite as severe as the one she had that required hospitalization, but with any activity, she develops sudden onset of shortness of  breath and chest pressure associated with nausea and lightheadedness.  She has had similar episodes dating back for months, but these seem to be getting more frequent and are occurring with less activity.  She tells me that she had an episode that was more prolonged and severe when she tried to walk the length of the hallway in her building to attend a birthday party.  Another episode occurred that was less severe and less prolonged when she was simply changing out the garbage bag in her wastebasket.        We had increased her metoprolol to 150 mg a day, splitting that into 75 mg in the morning and 75 mg at night.  However, she got confused about the dose in was only taking 50 mg once a day.  She came in for pacemaker check yesterday and her heart rates were clearly too high.  Her heart rates were between 100 and 150 beats per minute 45% of the time since 12/16.  She has been in atrial fibrillation 65% of that time and pacing minimally.  Most of this seems to be native conduction through her AV node of her atrial fibrillation with rapid ventricular response.      She indicates to me that she does not want to be too aggressive with anything.  She realizes that she is 88 years old and just wants to be comfortable and not necessarily extend her life.      On examination today, her blood pressure 115/91, heart rate 59 and weight 124 pounds.     We are following her on our tele-management program and her weight is up about 6 pounds since she was discharged from the hospital despite our attempts to increase her Lasix dosing.  I checked her labs today including a potassium which was 4.7 and a creatinine which was up from 1.3 now to 1.59.  Her lungs are clear at the moment and she is resting comfortably and breathing easily.  Her heart rhythm is irregular with a systolic murmur at the base and at the apex as well.   She has 1+ bilateral ankle edema.      IMPRESSION:  Ms. Rebecca Helms is an 88-year-old woman with the  following issues:   1.  Chronic diastolic heart failure with frequent acute heart failure/pulmonary edema episodes occurring with any level of exertion.   2.  Aortic stenosis which is thought to be moderately severe with a low gradient due to low stroke volume.  We have discussed possible TAVR procedure, but this was deferred because it was not clear that her aortic stenosis was severe.   3.  Moderately severe mitral regurgitation.   4.  Moderately severe tricuspid regurgitation.   5.  Paroxysmal atrial fibrillation with frequent episodes of rapid heart rates.  Some of this may be due to underdosing of her metoprolol.  I think she misunderstood how much metoprolol was recommended.  However, her blood pressures tend to run low and I am not confident that we can increase the metoprolol as aggressively as we need to control her heart rates during these episodes.   6.  Mild chronic kidney disease which looks a little bit worse at the moment, probably due to more recent aggressive diuresis.      This is a challenging set of problems including episodes of rapid atrial fibrillation, aortic stenosis and significant mitral regurgitation.  We could address any one of these issues, specifically and independently to try to help.  I think the simplest way to address this would be to try to control her heart rates better.  Since she already has a permanent pacemaker, I think it would be a fairly simple approach to this problem to simply do an AV node ablation and reprogram her pacemaker to accomplish better heart rate control.  If she continues to have recurrent episodes of acute diastolic heart failure after that maneuver, I would suggest that we consider transcatheter aortic valve replacement (TAVR) and then maybe then eventually consider mitral valve clip procedure, all depending on her symptoms.        As I mentioned above, she primarily just once she remained comfortable and be able to go to [employer] on Sundays.  She is not  looking to extend her life significantly.  I will continue to have her follow up with us in the [facility] for management of her congestive heart failure.  I will not increase her diuretics today due to her rising creatinine.      Sincerely,         Luis White MD, F.A.C.C.

## 2017-02-13 NOTE — TELEPHONE ENCOUNTER
Received VM on Sunday 2/12 from [facility] nurse. Pt called them Saturday night with concerns of trouble urinating. She was unable to urinate from midnight to noon the day before. They went out and saw her yesterday and did an assessment. She was able to urinate by the time they saw her, urine was clear. /60. Called pt, she states she is feeling pretty good today. She has been urinating fine and no problems. She states it was only about 12 hours she was unable to do so. Told pt if has this issue again where difficulty urinating, pain or burning, she should call PMD, as may need to be seen. Pt agreed. Pt states she had one episode of SOB this weekend. She denies swelling and states her legs are still looking good. She states her wt today is 120#, reviewed TELEMANAGEMENT, wt 120# for the last 4-5 days. Pt saw Dr. White Friday 2/10, at which time it was found out pt was only taking 50mg of Metoprolol increased of 150mg. Pt having high HR's on device, Metoprolol increased to 75mg daily. At OV with Dr. White, Lasix not increased due to rising creatinine. Pt taking Lasix 40mg daily. Pt set's up her own meds. She states she had her pill box with her on Friday at appointment and Dr. White helped her set them up. Pt states it is too difficult to make med changes over the phone, in the future, she needs all med changes in writing. Pt has OV on Wednesday 2/15 for MILADIS, Dr. Monae, and Farheen. Reviewed this with pt, she wrote down information and stated understanding. Will continue to monitor, as wt same as when she saw Dr. White. Nydia, RN

## 2017-02-15 NOTE — TELEPHONE ENCOUNTER
TELEMANAGEMENT: Wt 123# today, up 1# from yesterday and now 5# above max wt parameter.  No symptoms reports on survey today.  Pt takes lasix 40mg daily.  Pt ahs OV's with Dr. Monae and SULEMAN Zhong today.  TELEMANAGEMENT wt chart printed for Farheen to review at OV today.

## 2017-02-15 NOTE — MR AVS SNAPSHOT
After Visit Summary   2/15/2017    Rebecca Helms    MRN: [id number]           Patient Information     Date Of Birth          6/18/1928        Visit Information        Provider Department      2/15/2017 3:50 PM More, Maday Mckenzie PA-C [facility]        Today's Diagnoses     Acute diastolic congestive heart failure (H)          Care Instructions    Please call [facility] nurse for any questions or concerns:       [phone]    1. Medication changes: Stop taking Lasix/ furosemide  Start taking Demadex/ torsemide 20 mg once a day.    Also start amiodarone as directed by Dr. Monae.      2.  Weigh yourself daily and write it down.     3. Call [facility] nurse if your weight is up more than 2 pounds in one day, or 5 pounds in one week.    4. Call [facility] nurse if you feel more short of breath, have more abdominal bloating or leg swelling.    5. Eat a low sodium diet (less than 2,000mg daily). If you eat less salt, you will retain less fluid.     6. Results: labs are still not back.  We'll call with results.      7.  Follow up: as scheduled with Elisabeth Chakraborty on March 9th.        Scheduling phone number: [phone]  Reminder: Please bring in all current medications, over the counter supplements and vitamin bottles to your next appointment.          Follow-ups after your visit        Your next 10 appointments already scheduled     Mar 09, 2017 11:30 AM CST   LAB with [facility]   [facility] ([facility])    [address] [place] [id number] [phone]           Patient must bring picture ID.  Patient should be prepared to give a urine specimen  Please do not eat 10-12 hours before your appointment if you are coming in fasting for labs on lipids, cholesterol, or glucose (sugar).  Pregnant women should follow their Care Team instructions. Water with medications is okay. Do not drink coffee or other fluids.   If  you have concerns about taking  your medications, please ask at office or if scheduling via Character Boostert, send a message by clicking on Secure Messaging, Message Your Care Team.            Mar 09, 2017 12:30 PM CST   Return Visit with Lisbet Chakraborty PA-C   [facility] [place] ([facility])    [address]  [facility] [address]   [phone]            Mar 09, 2017  2:00 PM CST   Remote PPM Check with MADRID TECH1   [facility] ([facility])    [address] [place] [address] [address]  [facility] [id number] [phone]           This appointment is for a remote check of your pacemaker.  This is not an appointment at the office.            Mar 20, 2017  1:15 PM CDT   [facility] EP RETURN with Praful Monae MD   [facility] [place] ([facility])    [address] [place] [address] [address]  [facility] [address]   [phone]              Future tests that were ordered for you today     Open Future Orders        Priority Expected Expires Ordered    Follow-Up with Electrophysiologist Routine 3/17/2017 2/15/2018 2/15/2017    EKG 12-lead complete w/read - Clinics (to be scheduled) Routine 3/17/2017 2/15/2018 2/15/2017            Who to contact     If you have questions or need follow up information about today's clinic visit or your schedule please contact [facility] directly at [phone].  Normal or non-critical lab and imaging results will be communicated to you by [email or web address], letter or phone within 4 business days after the clinic has received the results. If you do not hear from us within 7 days, please contact the clinic through [email or web address] or phone. If you have a critical or abnormal lab result, we will notify you by phone as soon as possible.  Submit refill requests through Lumus or call your pharmacy and they will forward the refill request to  "us. Please allow 3 business days for your refill to be completed.          Additional Information About Your Visit        Idooblehart Information     Your Truman Show gives you secure access to your electronic health record. If you see a primary care provider, you can also send messages to your care team and make appointments. If you have questions, please call your primary care clinic.  If you do not have a primary care provider, please call [phone] and they will assist you.        Care EveryWhere ID     This is your Care EveryWhere ID. This could be used by other organizations to access your [place] medical records  [id number]        Your Vitals Were     Pulse Height BMI (Body Mass Index)             60 1.549 m (5' 1\") 23.43 kg/m2          Blood Pressure from Last 3 Encounters:   02/15/17 137/89   02/15/17 137/89   02/10/17 (!) 115/91    Weight from Last 3 Encounters:   02/15/17 56.2 kg (124 lb)   02/15/17 56.6 kg (124 lb 11.2 oz)   02/10/17 56.3 kg (124 lb 3.2 oz)              We Performed the Following     Follow-Up with [facility]          Today's Medication Changes          These changes are accurate as of: 2/15/17  4:34 PM.  If you have any questions, ask your nurse or doctor.               Start taking these medicines.        Dose/Directions    amiodarone 200 MG tablet   Commonly known as:  PACERONE/CODARONE   Used for:  Persistent atrial fibrillation (H)   Started by:  Praful Monae MD        Dose:  200 mg   Take 1 tablet (200 mg) by mouth 2 times daily   Quantity:  30 tablet   Refills:  3       torsemide 20 MG tablet   Commonly known as:  DEMADEX   Used for:  Acute diastolic congestive heart failure (H)   Started by:  Maday Diaz PA-C        Dose:  20 mg   Take 1 tablet (20 mg) by mouth daily   Quantity:  30 tablet   Refills:  3         Stop taking these medicines if you haven't already. Please contact your care team if you have questions.     furosemide 40 MG tablet   Commonly known as:  " LASIX   Stopped by:  Maday Diaz PA-C                Where to get your medicines      These medications were sent to [id number]  [place], [place] - [address] AT [address]  [address] [place] [address] [address]    Hours:  24-hours Phone:  [phone]     amiodarone 200 MG tablet    torsemide 20 MG tablet                Primary Care Provider Office Phone # Fax #    Ren Gipson [phone]       [facility] [address] [address]  [facility] [address]        Thank you!     Thank you for choosing [facility] [place]  for your care. Our goal is always to provide you with excellent care. Hearing back from our patients is one way we can continue to improve our services. Please take a few minutes to complete the written survey that you may receive in the mail after your visit with us. Thank you!             Your Updated Medication List - Protect others around you: Learn how to safely use, store and throw away your medicines at www.disposemymeds.org.          This list is accurate as of: 2/15/17  4:34 PM.  Always use your most recent med list.                   Brand Name Dispense Instructions for use    amiodarone 200 MG tablet    PACERONE/CODARONE    30 tablet    Take 1 tablet (200 mg) by mouth 2 times daily       apixaban ANTICOAGULANT 2.5 MG tablet    ELIQUIS    90 tablet    Take 2.5 mg by mouth 2 times daily       brimonidine 0.15 % ophthalmic solution    ALPHAGAN-P     Place 1 drop into both eyes 2 times daily       calcium-vitamin D 600-400 MG-UNIT per tablet    CALTRATE     Take 1 tablet by mouth daily       latanoprost 0.005 % ophthalmic solution    XALATAN     Place 1 drop into both eyes At Bedtime       lisinopril 10 MG tablet    PRINIVIL/ZESTRIL     Take 1 tablet (10 mg) by mouth daily at night       metoprolol 25 MG 24 hr tablet    TOPROL-XL    180 tablet    Take 3 tablets (75 mg) by mouth daily        moxifloxacin 0.5 % ophthalmic solution    VIGAMOX     Place 1 drop Into the left eye daily as needed (eye infections)       Multi-vitamin Tabs tablet      Take 1 tablet by mouth daily.       order for DME     1 each    Equipment being ordered: Walker Wheels () and Walker () Treatment Diagnosis: difficulty with gait       pravastatin 20 MG tablet    PRAVACHOL    30 tablet    Take 1 tablet (20 mg) by mouth daily       timolol 0.5 % ophthalmic solution    TIMOPTIC     Place 1 drop into both eyes 2 times daily       torsemide 20 MG tablet    DEMADEX    30 tablet    Take 1 tablet (20 mg) by mouth daily       TRAMADOL HCL PO      Take 50 mg by mouth At Bedtime Patient takes scheduled       TYLENOL ARTHRITIS PAIN 650 MG CR tablet   Generic drug:  acetaminophen      Take 650 mg by mouth daily

## 2017-02-15 NOTE — PROGRESS NOTES
"HPI and Plan:   See dictation  [id number]    Physical Exam:  Vitals: /89  Pulse 60  Ht 1.549 m (5' 1\")  Wt 56.6 kg (124 lb 11.2 oz)  BMI 23.56 kg/m2    Constitutional:  AAO x3.  Pt is in NAD.  HEAD: normocephalic.  SKIN: Skin normal color, texture and turgor with no lesions or eruptions.  Eyes: PERRL, EOMI.  ENT:  Supple, normal JVP. No lymphadenopathy or thyroid enlargement.  Chest:  CTAB.  Cardiac:   RRR, normal  S1 and S2.   Systolic murmur in LUSB III/VI.  Abdomen:  Normal BS.  Soft, non-tender and non-distended.  No rebound or guarding.    Extremities:  Pedious pulses palpable B/L.  LE edema noticed (1+).   Neurological: Strength and sensation grossly symmetric and intact throughout.       CURRENT MEDICATIONS:  Current Outpatient Prescriptions   Medication Sig Dispense Refill     amiodarone (PACERONE/CODARONE) 200 MG tablet Take 1 tablet (200 mg) by mouth 2 times daily 30 tablet 3     metoprolol (TOPROL-XL) 25 MG 24 hr tablet Take 3 tablets (75 mg) by mouth daily 180 tablet 3     furosemide (LASIX) 40 MG tablet Take 1 tablet (40 mg) by mouth daily And  as directed by [facility] 100 tablet 3     acetaminophen (TYLENOL ARTHRITIS PAIN) 650 MG CR tablet Take 650 mg by mouth daily       lisinopril (PRINIVIL/ZESTRIL) 10 MG tablet Take 1 tablet (10 mg) by mouth daily at night       moxifloxacin (VIGAMOX) 0.5 % ophthalmic solution Place 1 drop Into the left eye daily as needed (eye infections)        apixaban ANTICOAGULANT (ELIQUIS) 2.5 MG tablet Take 2.5 mg by mouth 2 times daily  90 tablet 1     brimonidine (ALPHAGAN-P) 0.15 % ophthalmic solution Place 1 drop into both eyes 2 times daily        pravastatin (PRAVACHOL) 20 MG tablet Take 1 tablet (20 mg) by mouth daily 30 tablet 11     calcium-vitamin D (CALTRATE) 600-400 MG-UNIT per tablet Take 1 tablet by mouth daily        latanoprost (XALATAN) 0.005 % ophthalmic solution Place 1 drop into both eyes At Bedtime       timolol (TIMOPTIC) 0.5 % ophthalmic solution " Place 1 drop into both eyes 2 times daily       TRAMADOL HCL PO Take 50 mg by mouth At Bedtime Patient takes scheduled       multivitamin, therapeutic with minerals (MULTI-VITAMIN) TABS Take 1 tablet by mouth daily.       order for DME Equipment being ordered: Walker Wheels () and Walker ()  Treatment Diagnosis: difficulty with gait 1 each 0       ALLERGIES   No Known Allergies    PAST MEDICAL HISTORY:  Past Medical History   Diagnosis Date     Aortic stenosis      moderate to severe     Back pain      CAD (coronary artery disease)      nonobstructive     Carotid stenosis      left side     Chronic diastolic CHF (congestive heart failure) (H)      Dyslipidemia      Glaucoma      HTN (hypertension)      Osteoporosis      Paroxysmal atrial fibrillation (H) 9/12/14     PM detected, <1 minute each time     Pulmonary hypertension (H)      moderate to severe     Sinus node dysfunction (H)      sp DDD PM     Syncope        PAST SURGICAL HISTORY:  Past Surgical History   Procedure Laterality Date     Gyn surgery       OVARIAN CYSTECTOMY     Ent surgery       TONSILLECTOMY/BILAT EAR SURGERY     Eye surgery       BILAT CATARACT-IOL     Dilation and curettage, hysteroscopy diagnostic, combined  1/16/2013     Procedure: COMBINED DILATION AND CURETTAGE, HYSTEROSCOPY DIAGNOSTIC;   DILATION AND CURETTAGE, HYSTEROSCOPY;  Surgeon: Ozzie Lo MD;  Location:  [facility]     Implant pacemaker       dual chamber       FAMILY HISTORY:  Family History   Problem Relation Age of Onset     Unknown/Adopted Mother      Myocardial Infarction Father      Heart Surgery Father        SOCIAL HISTORY:  Social History     Social History     Marital status:      Spouse name: N/A     Number of children: N/A     Years of education: N/A     Social History Main Topics     Smoking status: Never Smoker     Smokeless tobacco: None     Alcohol use Yes      Comment: OCC     Drug use: No     Sexual activity: Not Asked     Other Topics Concern      Caffeine Concern No     2-3 cups decaf coffee per day     Sleep Concern No     Weight Concern Yes     weight loss     Special Diet No     Exercise Yes     exercises at home     Social History Narrative       Review of Systems:  Skin:  Negative     Eyes:  Positive for glasses (reading only)  ENT:  Negative    Respiratory:  Negative    Cardiovascular:    Positive for;dizziness;fatigue;palpitations;lightheadedness;edema  Gastroenterology: Positive for nausea  Genitourinary:  Positive for    Musculoskeletal:  Positive for back pain  Neurologic:  Positive for    Psychiatric:  Negative    Heme/Lymph/Imm:  Negative    Endocrine:  Negative        Recent Lab Results:  LIPID RESULTS:  No results found for: CHOL, HDL, LDL, TRIG, CHOLHDLRATIO    LIVER ENZYME RESULTS:  Lab Results   Component Value Date    AST 44 01/20/2017    ALT 44 01/20/2017       CBC RESULTS:  Lab Results   Component Value Date    WBC 9.3 01/20/2017    RBC 4.11 01/20/2017    HGB 13.7 01/20/2017    HCT 40.7 01/20/2017    MCV 99 01/20/2017    MCH 33.3 (H) 01/20/2017    MCHC 33.7 01/20/2017    RDW 14.9 01/20/2017     01/20/2017       BMP RESULTS:  Lab Results   Component Value Date     (L) 02/10/2017    POTASSIUM 4.7 02/10/2017    CHLORIDE 96 (L) 02/10/2017    CO2 29 02/10/2017    ANIONGAP 12.7 02/10/2017     (H) 02/10/2017    BUN 35 (H) 02/10/2017    CR 1.59 (H) 02/10/2017    GFRESTIMATED 31 (L) 02/10/2017    GFRESTBLACK 37 (L) 02/10/2017    CARLO 9.5 02/10/2017        A1C RESULTS:  No results found for: A1C    INR RESULTS:  Lab Results   Component Value Date    INR 1.17 (H) 12/08/2016    INR 1.08 08/06/2011         ECHOCARDIOGRAM  Recent Results (from the past 8760 hour(s))   Echocardiogram Complete    Narrative    [id number]  Replaced by [facility]19  [id number]  [id number]^MORE^JASPER^LUCAS VALDOVINOS        [facility]  [facility]  Echocardiography Laboratory  [address]  [place] [address]  Phone [phone]  Fax [phone]        Name: ANTHONY KEMP  MRN: [id number]  : 1928  Study Date: 2017 10:32 AM  Age: 88 yrs  Gender: Female  [place] [facility] [place] For Study: Chronic diastolic (congestive) heart failure  Ordering Physician: JASPER RUIZ  Referring Physician: MANJIT DIALLO  Performed By: Mitch Panchal RDCS     BSA: 1.5 m2  Height: 61 in  Weight: 116 lb  HR: 96  BP: 96/60 mmHg  _____________________________________________________________________________  __     Procedure  Complete Echo Adult. Complete Portable Echo Adult.     _____________________________________________________________________________  __        Interpretation Summary     The left ventricle is normal in size.  The visual ejection fraction is estimated at 55-60%.  No regional wall motion abnormalities noted.  There is moderately severe (3+) mitral regurgitation.  There is mod-severe to severe (3-4+) tricuspid regurgitation.  Moderate (46-55mmHg) pulmonary hypertension is present.  Moderate to severe valvular aortic stenosis.  Rhythm suspicious for atrial fibrillation, please confirm with 12 lead ECG.  _____________________________________________________________________________  __        Left Ventricle  The left ventricle is normal in size. There is normal left ventricular wall  thickness. The visual ejection fraction is estimated at 55-60%. No regional  wall motion abnormalities noted.     Right Ventricle  There is a catheter/pacemaker lead seen in the right ventricle. The right  ventricle is normal in size and function.     Atria  The left atrium is severely dilated. The right atrium is moderately dilated.  There is no color Doppler evidence of an atrial shunt.     Mitral Valve  The mitral valve leaflets are mildly thickened. There is moderately severe  (3+) mitral regurgitation.     Tricuspid Valve  There is mod-severe to severe (3-4+) tricuspid regurgitation. The right  ventricular systolic pressure is  approximated at 40.2 mmHg plus the right  atrial pressure. Normal IVC (1.5-2.5cm) with <50% respiratory collapse; right  atrial pressure is estimated at 10-15mmHg. Moderate (46-55mmHg) pulmonary  hypertension is present.        Aortic Valve  No aortic regurgitation is present. The mean AoV pressure gradient is 8.0  mmHg. The calculated aortic valve are is 1.0 cm^2. Moderate to severe valvu  lar  aortic stenosis.     Pulmonic Valve  There is trace pulmonic valvular regurgitation.     Vessels  Normal size aorta. The aortic root is normal size.     Pericardium  Small pericardial effusion.     Rhythm  Rhythm suspicious for atrial fibrillation.     _____________________________________________________________________________  __  MMode/2D Measurements & Calculations  IVSd: 1.1 cm  LVIDd: 3.8 cm  LVIDs: 2.5 cm  LVPWd: 0.97 cm  FS: 32.6 %  EDV(Teich): 60.2 ml  ESV(Teich): 23.0 ml  LV mass(C)d: 120.9 grams  Ao root diam: 2.8 cm  LA dimension: 4.9 cm     asc Aorta Diam: 3.1 cm  LA/Ao: 1.7  LVOT diam: 1.9 cm  LVOT area: 2.9 cm2  LA Volume (BP): 89.5 ml  LA Volume Index (BP): 59.7 ml/m2        Doppler Measurements & Calculations  MV E max sagrario: 93.9 cm/sec  MV dec time: 0.15 sec  Ao V2 max: 198.0 cm/sec  Ao max PG: 15.7 mmHg  Ao V2 mean: 132.9 cm/sec  Ao mean P.0 mmHg  Ao V2 VTI: 38.1 cm  CHUCK(I,D): 1.0 cm2  CHUCK(V,D): 1.2 cm2  LV V1 max P.5 mmHg  LV V1 max: 78.3 cm/sec  LV V1 VTI: 13.7 cm     SV(LVOT): 39.8 ml  TR max sagrario: 316.9 cm/sec  TR max P.2 mmHg  CHUCK Index (cm2/m2): 0.70  Lateral E/e': 9.1  Medial E/e': 14.6           _____________________________________________________________________________  __           Report approved by: Venecia Spann 2017 01:41 PM      Echocardiogram    Narrative    Interpretation Summary              [facility]  [facility]  Echocardiography Laboratory  [address]  [place] [address]  Phone [phone]  Fax  [phone]        Name: ANTHONY KEMP  MRN: [id number]  : 1928  Study Date: 2016 08:35 AM  Age: 88 yrs  Gender: Female  [place] [facility] [place] Study:     Ordering Physician: ASHU OLIVARES  Referring Physician: ASHU OLIVARES  Performed By: Cecy Villa RDCS  BSA: 1.6 m2  Height: 64 in  Weight: 116 lb  HR: 64  BP: 163/78 mmHg     ______________________________________________________________________________     Procedure  Complete Echo Adult.     ______________________________________________________________________________     Interpretation Summary     The visual ejection fraction is estimated at 55-60%.  Left ventricular systolic function is normal.  Grade III or advanced diastolic dysfunction.  There is moderate (2+) mitral regurgitation.  There is moderate (2+) tricuspid regurgitation.  Right ventricular systolic pressure is elevated, consistent with moderate to  severe pulmonary hypertension.  Moderate to severe valvular aortic stenosis.  The calculated aortic valve area and gradients are very similar to 2014. The  relatively low gradients compared to the more severe calculated aortic valve  area were also noted in 2014. The 2 D appearance of the valve is more in  keeping with a more severely stenosed aortic valve and the low gradients may  represent low cardiac output.  ______________________________________________________________________________           Left Ventricle  The left ventricle is normal in size. There is mild concentric left  ventricular hypertrophy. The visual ejection fraction is estimated at 55-60%.  Left ventricular systolic function is normal. Grade III or advanced diastolic  dysfunction. E by E prime ratio is greater than 15, that likely suggests  increased left ventricular filling pressures.  Right Ventricle  There is a catheter/pacemaker lead seen in the right ventricle. The right  ventricle is normal in size and function.     Atria  The  left atrium is mild to moderately dilated. The right atrium is mild to  moderately dilated. There is no color Doppler evidence of an atrial shunt.     Mitral Valve  There is mild mitral annular calcification. There is moderate (2+) mitral  regurgitation.     Tricuspid Valve  There is moderate (2+) tricuspid regurgitation. The right ventricular  systolic pressure is approximated at 54.6 mmHg plus the right atrial  pressure. Normal IVC (1.5-2.5cm) with >50% respiratory collapse; right atrial  pressure is estimated at 5-10mmHg. Right ventricular systolic pressure is  elevated, consistent with moderate to severe pulmonary hypertension.     Aortic Valve  The aortic valve is not well visualized. The calculated aortic valve are is  0.83 cm^2. The mean AoV pressure gradient is 11.0 mmHg. The peak AoV pressure  gradient is 17.5 mmHg. Moderate to severe valvular aortic stenosis.     Pulmonic Valve  There is trace pulmonic valvular regurgitation.  Vessels  The IVC is normal in size and reactivity with respiration, suggesting normal  central venous pressure.     Pericardium  Trivial posterior pericardial effusion.     Rhythm  The rhythm was normal sinus.  ______________________________________________________________________________     MMode/2D Measurements & Calculations  IVSd: 1.2 cm  LVIDd: 3.9 cm  LVIDs: 2.9 cm  LVPWd: 1.1 cm  FS: 26.4 %  EDV(Teich): 65.2 ml  ESV(Teich): 31.1 ml  LV mass(C)d: 145.9 grams  Ao root diam: 2.6 cm  LA dimension: 4.2 cm  asc Aorta Diam: 3.1 cm  LA/Ao: 1.6  LVOT diam: 1.9 cm  LVOT area: 2.9 cm2  LA Volume (BP): 53.9 ml  LA Volume Index (BP): 34.8 ml/m2           Doppler Measurements & Calculations  MV E max sagrario: 100.9 cm/sec  MV A max sagrario: 34.0 cm/sec  MV E/A: 3.0  MV P1/2t max sagrario: 100.9 cm/sec  MV P1/2t: 59.8 msec  MVA(P1/2t): 3.7 cm2  MV dec slope: 494.0 cm/sec2  MV dec time: 0.20 sec  Ao V2 max: 209.2 cm/sec  Ao max P.5 mmHg  Ao V2 mean: 155.5 cm/sec  Ao mean P.0 mmHg  Ao V2 VTI:  57.1 cm  CHUCK(I,D): 0.83 cm2  CHUCK(V,D): 0.97 cm2  LV V1 max P.9 mmHg  LV V1 max: 68.9 cm/sec  LV V1 VTI: 16.1 cm  SV(LVOT): 47.2 ml  PA acc time: 0.08 sec  TR max sagrario: 369.5 cm/sec  TR max P.6 mmHg  CHUCK Index (cm2/m2): 0.53  Lateral E/e': 15.2  Medial E/e': 17.5              ______________________________________________________________________________        Report approved by: Venecia Alexander 2016 09:37 AM            Orders Placed This Encounter   Procedures     Follow-Up with Electrophysiologist     EKG 12-lead complete w/read - Clinics (to be scheduled)     Orders Placed This Encounter   Medications     amiodarone (PACERONE/CODARONE) 200 MG tablet     Sig: Take 1 tablet (200 mg) by mouth 2 times daily     Dispense:  30 tablet     Refill:  3     Amiodarone 200 mg PO BID for 7 days, and then decrease to 200 mg PO daily.     There are no discontinued medications.      Encounter Diagnoses   Name Primary?     Acute diastolic congestive heart failure (H)      Persistent atrial fibrillation (H) Yes         CC  Luis White MD   [facility]  [address]  [place] [address]

## 2017-02-15 NOTE — MR AVS SNAPSHOT
After Visit Summary   2/15/2017    Rebecca Helms    MRN: [id number]           Patient Information     Date Of Birth          6/18/1928        Visit Information        Provider Department      2/15/2017 3:15 PM Praful Monae MD [facility]        Today's Diagnoses     Persistent atrial fibrillation (H)    -  1    Acute diastolic congestive heart failure (H)           Follow-ups after your visit        Additional Services     Follow-Up with Electrophysiologist                 Your next 10 appointments already scheduled     Mar 09, 2017 11:30 AM CST   LAB with [facility]   [facility] ([facility])    [address] [place] [address]   [phone]           Patient must bring picture ID.  Patient should be prepared to give a urine specimen  Please do not eat 10-12 hours before your appointment if you are coming in fasting for labs on lipids, cholesterol, or glucose (sugar).  Pregnant women should follow their Care Team instructions. Water with medications is okay. Do not drink coffee or other fluids.   If you have concerns about taking  your medications, please ask at office or if scheduling via [employer], send a message by clicking on Secure Messaging, Message Your Care Team.            Mar 09, 2017 12:30 PM CST   Return Visit with Lisbet Chakraborty PA-C   [facility] ([facility])    [address] [place] [address] [phone]            Mar 09, 2017  2:00 PM CST   Remote PPM Check with [facility] TECH1   [facility] ([facility])    [address] [place] [address] [phone]           This appointment is for a remote check of your pacemaker.  This is not an appointment at the office.            Mar 20, 2017  1:15 PM CDT   [facility] EP RETURN with  "Praful Monae MD   [facility] [place] ([facility])    [id number]  [facility] [address] [phone]              Future tests that were ordered for you today     Open Future Orders        Priority Expected Expires Ordered    Follow-Up with Electrophysiologist Routine 3/17/2017 2/15/2018 2/15/2017    EKG 12-lead complete w/read - Clinics (to be scheduled) Routine 3/17/2017 2/15/2018 2/15/2017            Who to contact     If you have questions or need follow up information about today's clinic visit or your schedule please contact [facility] [place] directly at [phone].  Normal or non-critical lab and imaging results will be communicated to you by [employer], letter or phone within 4 business days after the clinic has received the results. If you do not hear from us within 7 days, please contact the clinic through [employer] or phone. If you have a critical or abnormal lab result, we will notify you by phone as soon as possible.  Submit refill requests through [employer] or call your pharmacy and they will forward the refill request to us. Please allow 3 business days for your refill to be completed.          Additional Information About Your Visit        [employer] Information     [employer] gives you secure access to your electronic health record. If you see a primary care provider, you can also send messages to your care team and make appointments. If you have questions, please call your primary care clinic.  If you do not have a primary care provider, please call [phone] and they will assist you.        Care EveryWhere ID     This is your Care EveryWhere ID. This could be used by other organizations to access your [facility] medical records  [id number]        Your Vitals Were     Pulse Height BMI (Body Mass Index)             60 1.549 m (5' 1\") 23.56 kg/m2          Blood Pressure from Last 3 Encounters:   02/15/17 " 137/89   02/10/17 (!) 115/91   01/27/17 116/62    Weight from Last 3 Encounters:   02/15/17 56.6 kg (124 lb 11.2 oz)   02/10/17 56.3 kg (124 lb 3.2 oz)   01/27/17 52.9 kg (116 lb 9.6 oz)              We Performed the Following     Follow-Up with Electrophysiologist          Today's Medication Changes          These changes are accurate as of: 2/15/17  3:55 PM.  If you have any questions, ask your nurse or doctor.               Start taking these medicines.        Dose/Directions    amiodarone 200 MG tablet   Commonly known as:  PACERONE/CODARONE   Used for:  Persistent atrial fibrillation (H)   Started by:  Praful Monae MD        Dose:  200 mg   Take 1 tablet (200 mg) by mouth 2 times daily   Quantity:  30 tablet   Refills:  3            Where to get your medicines      These medications were sent to [facility] [address] [place] [address] [address] [place] [address] [facility] [address]    Hours:  24-hours Phone:  [phone]     amiodarone 200 MG tablet                Primary Care Provider Office Phone # Fax #    Ren Gipson [phone]       [facility] MANAG[facility] [address]  [address]  [facility] [address]        Thank you!     Thank you for choosing [facility] [place]  for your care. Our goal is always to provide you with excellent care. Hearing back from our patients is one way we can continue to improve our services. Please take a few minutes to complete the written survey that you may receive in the mail after your visit with us. Thank you!             Your Updated Medication List - Protect others around you: Learn how to safely use, store and throw away your medicines at www.disposemymeds.org.          This list is accurate as of: 2/15/17  3:55 PM.  Always use your most recent med list.                   Brand Name Dispense Instructions for use    amiodarone 200 MG tablet    PACERONE/CODARONE    30 tablet     Take 1 tablet (200 mg) by mouth 2 times daily       apixaban ANTICOAGULANT 2.5 MG tablet    ELIQUIS    90 tablet    Take 2.5 mg by mouth 2 times daily       brimonidine 0.15 % ophthalmic solution    ALPHAGAN-P     Place 1 drop into both eyes 2 times daily       calcium-vitamin D 600-400 MG-UNIT per tablet    CALTRATE     Take 1 tablet by mouth daily       furosemide 40 MG tablet    LASIX    100 tablet    Take 1 tablet (40 mg) by mouth daily And  as directed by [facility]       latanoprost 0.005 % ophthalmic solution    XALATAN     Place 1 drop into both eyes At Bedtime       lisinopril 10 MG tablet    PRINIVIL/ZESTRIL     Take 1 tablet (10 mg) by mouth daily at night       metoprolol 25 MG 24 hr tablet    TOPROL-XL    180 tablet    Take 3 tablets (75 mg) by mouth daily       moxifloxacin 0.5 % ophthalmic solution    VIGAMOX     Place 1 drop Into the left eye daily as needed (eye infections)       Multi-vitamin Tabs tablet      Take 1 tablet by mouth daily.       order for DME     1 each    Equipment being ordered: Walker Wheels () and Walker () Treatment Diagnosis: difficulty with gait       pravastatin 20 MG tablet    PRAVACHOL    30 tablet    Take 1 tablet (20 mg) by mouth daily       timolol 0.5 % ophthalmic solution    TIMOPTIC     Place 1 drop into both eyes 2 times daily       TRAMADOL HCL PO      Take 50 mg by mouth At Bedtime Patient takes scheduled       TYLENOL ARTHRITIS PAIN 650 MG CR tablet   Generic drug:  acetaminophen      Take 650 mg by mouth daily

## 2017-02-15 NOTE — PROGRESS NOTES
[id number]  HPI and Plan:   See dictation    Orders this Visit:  No orders of the defined types were placed in this encounter.    Orders Placed This Encounter   Medications     DISCONTD: torsemide (DEMADEX) 10 MG tablet     Sig: Take 1 tablet (10 mg) by mouth daily     Dispense:  30 tablet     Refill:  3     torsemide (DEMADEX) 20 MG tablet     Sig: Take 1 tablet (20 mg) by mouth daily     Dispense:  30 tablet     Refill:  3     Replaces rx for furosemide and torsemide 10 mg     Medications Discontinued During This Encounter   Medication Reason     furosemide (LASIX) 40 MG tablet      torsemide (DEMADEX) 10 MG tablet Reorder         Encounter Diagnosis   Name Primary?     Acute diastolic congestive heart failure (H)        CURRENT MEDICATIONS:  Current Outpatient Prescriptions   Medication Sig Dispense Refill     amiodarone (PACERONE/CODARONE) 200 MG tablet Take 1 tablet (200 mg) by mouth 2 times daily 30 tablet 3     torsemide (DEMADEX) 20 MG tablet Take 1 tablet (20 mg) by mouth daily 30 tablet 3     metoprolol (TOPROL-XL) 25 MG 24 hr tablet Take 3 tablets (75 mg) by mouth daily 180 tablet 3     acetaminophen (TYLENOL ARTHRITIS PAIN) 650 MG CR tablet Take 650 mg by mouth daily       lisinopril (PRINIVIL/ZESTRIL) 10 MG tablet Take 1 tablet (10 mg) by mouth daily at night       order for DME Equipment being ordered: Walker Wheels () and Walker ()  Treatment Diagnosis: difficulty with gait 1 each 0     moxifloxacin (VIGAMOX) 0.5 % ophthalmic solution Place 1 drop Into the left eye daily as needed (eye infections)        apixaban ANTICOAGULANT (ELIQUIS) 2.5 MG tablet Take 2.5 mg by mouth 2 times daily  90 tablet 1     brimonidine (ALPHAGAN-P) 0.15 % ophthalmic solution Place 1 drop into both eyes 2 times daily        pravastatin (PRAVACHOL) 20 MG tablet Take 1 tablet (20 mg) by mouth daily 30 tablet 11     calcium-vitamin D (CALTRATE) 600-400 MG-UNIT per tablet Take 1 tablet by mouth daily        latanoprost  (XALATAN) 0.005 % ophthalmic solution Place 1 drop into both eyes At Bedtime       timolol (TIMOPTIC) 0.5 % ophthalmic solution Place 1 drop into both eyes 2 times daily       TRAMADOL HCL PO Take 50 mg by mouth At Bedtime Patient takes scheduled       multivitamin, therapeutic with minerals (MULTI-VITAMIN) TABS Take 1 tablet by mouth daily.       [DISCONTINUED] torsemide (DEMADEX) 10 MG tablet Take 1 tablet (10 mg) by mouth daily 30 tablet 3       ALLERGIES   No Known Allergies    PAST MEDICAL HISTORY:  Past Medical History   Diagnosis Date     Aortic stenosis      moderate to severe     Back pain      CAD (coronary artery disease)      nonobstructive     Carotid stenosis      left side     Chronic diastolic CHF (congestive heart failure) (H)      Dyslipidemia      Glaucoma      HTN (hypertension)      Osteoporosis      Paroxysmal atrial fibrillation (H) 9/12/14     PM detected, <1 minute each time     Pulmonary hypertension (H)      moderate to severe     Sinus node dysfunction (H)      sp DDD PM     Syncope        PAST SURGICAL HISTORY:  Past Surgical History   Procedure Laterality Date     Gyn surgery       OVARIAN CYSTECTOMY     Ent surgery       TONSILLECTOMY/BILAT EAR SURGERY     Eye surgery       BILAT CATARACT-IOL     Dilation and curettage, hysteroscopy diagnostic, combined  1/16/2013     Procedure: COMBINED DILATION AND CURETTAGE, HYSTEROSCOPY DIAGNOSTIC;   DILATION AND CURETTAGE, HYSTEROSCOPY;  Surgeon: Ozzie Lo MD;  Location:  [facility]     Implant pacemaker       dual chamber       FAMILY HISTORY:  Family History   Problem Relation Age of Onset     Unknown/Adopted Mother      Myocardial Infarction Father      Heart Surgery Father        SOCIAL HISTORY:  Social History     Social History     Marital status:      Spouse name: N/A     Number of children: N/A     Years of education: N/A     Social History Main Topics     Smoking status: Never Smoker     Smokeless tobacco: Not on file      "Alcohol use Yes      Comment: OCC     Drug use: No     Sexual activity: Not on file     Other Topics Concern     Caffeine Concern No     2-3 cups decaf coffee per day     Sleep Concern No     Weight Concern Yes     weight loss     Special Diet No     Exercise Yes     exercises at home     Social History Narrative       Review of Systems:  Skin:        Eyes:       ENT:       Respiratory:       Cardiovascular:       Gastroenterology:      Genitourinary:       Musculoskeletal:       Neurologic:       Psychiatric:       Heme/Lymph/Imm:       Endocrine:         Physical Exam:  Vitals: /89  Pulse 60  Ht 1.549 m (5' 1\")  Wt 56.2 kg (124 lb)  BMI 23.43 kg/m2   Please refer to dictation for physical exam    Recent Lab Results:  LIPID RESULTS:  No results found for: CHOL, HDL, LDL, TRIG, CHOLHDLRATIO    LIVER ENZYME RESULTS:  Lab Results   Component Value Date    AST 46 (H) 02/15/2017    ALT 39 02/15/2017       CBC RESULTS:  Lab Results   Component Value Date    WBC 9.3 01/20/2017    RBC 4.11 01/20/2017    HGB 13.7 01/20/2017    HCT 40.7 01/20/2017    MCV 99 01/20/2017    MCH 33.3 (H) 01/20/2017    MCHC 33.7 01/20/2017    RDW 14.9 01/20/2017     01/20/2017       BMP RESULTS:  Lab Results   Component Value Date     02/15/2017    POTASSIUM 3.9 02/15/2017    CHLORIDE 99 02/15/2017    CO2 24 02/15/2017    ANIONGAP 12 02/15/2017    GLC 87 02/15/2017    BUN 42 (H) 02/15/2017    CR 1.63 (H) 02/15/2017    GFRESTIMATED 30 (L) 02/15/2017    GFRESTBLACK 36 (L) 02/15/2017    CARLO 9.1 02/15/2017        A1C RESULTS:  No results found for: A1C    INR RESULTS:  Lab Results   Component Value Date    INR 1.17 (H) 12/08/2016    INR 1.08 08/06/2011           CC  Luis White MD   [facility]  [id number]  [place] [id number]     "

## 2017-02-15 NOTE — PATIENT INSTRUCTIONS
Please call [facility] nurse for any questions or concerns:       [phone]    1. Medication changes: Stop taking Lasix/ furosemide  Start taking Demadex/ torsemide 20 mg once a day.    Also start amiodarone as directed by Dr. Monae.      2.  Weigh yourself daily and write it down.     3. Call [facility] nurse if your weight is up more than 2 pounds in one day, or 5 pounds in one week.    4. Call [facility] nurse if you feel more short of breath, have more abdominal bloating or leg swelling.    5. Eat a low sodium diet (less than 2,000mg daily). If you eat less salt, you will retain less fluid.     6. Results: labs are still not back.  We'll call with results.      7.  Follow up: as scheduled with Elisabeth Chakraborty on March 9th.        Scheduling phone number: [phone]  Reminder: Please bring in all current medications, over the counter supplements and vitamin bottles to your next appointment.

## 2017-02-15 NOTE — LETTER
2/15/2017    [facility]   [address]  [address]  [facility] [address]    RE: Rebecca Helms       Dear Colleague,    I had the pleasure of seeing Rebecca Helms in the [facility].    REASON FOR VISIT:  Evaluation of paroxysmal atrial fibrillation.      Ms. Helms is a delightful 88-year-old lady with a history of hypertension, hyperlipidemia, paroxysmal atrial fibrillation, sick sinus syndrome (status post pacemaker implantation 3 years ago), heart failure and moderate to severe aortic stenosis who is here for evaluation of paroxysmal atrial fibrillation.      The patient has had several admissions in the hospital with heart failure exacerbation associated with AFib with RVR.  She was admitted on 12/08 and most recently 01/23.  She was evaluated by my colleague, Dr. White, and she was still complaining of having episodes of paroxysmal palpitations associated with shortness of breath, associated with recurrent episodes of AFib with RVR.  She was referred here for evaluation for second opinion regarding management of atrial fibrillation.      She denies any other symptoms such as lightheadedness, near-syncopal or syncopal episode.  Device was interrogated 02/09.  She has a 65% AFib burden with episodes of AFib with RVR.  Lead parameters seem to be stable.  She is only atrially paced previously 7% of the time and ventricular paced 10% of the time.  Nuclear stress test obtained in 12/2016 showed no ischemia.  Echocardiogram obtained in 02/2017 showed EF of 55%-60% with moderate to severe MR and moderately severe TR and moderate to severe aortic stenosis.     Outpatient Encounter Prescriptions as of 2/15/2017   Medication Sig Dispense Refill     [DISCONTINUED] amiodarone (PACERONE/CODARONE) 200 MG tablet Take 1 tablet (200 mg) by mouth 2 times daily 30 tablet 3     metoprolol (TOPROL-XL) 25 MG 24 hr tablet Take 3 tablets (75 mg) by mouth daily 180 tablet 3      [DISCONTINUED] furosemide (LASIX) 40 MG tablet Take 1 tablet (40 mg) by mouth daily And  as directed by [facility] 100 tablet 3     acetaminophen (TYLENOL ARTHRITIS PAIN) 650 MG CR tablet Take 650 mg by mouth daily       lisinopril (PRINIVIL/ZESTRIL) 10 MG tablet Take 1 tablet (10 mg) by mouth daily at night       moxifloxacin (VIGAMOX) 0.5 % ophthalmic solution Place 1 drop Into the left eye daily as needed (eye infections)        apixaban ANTICOAGULANT (ELIQUIS) 2.5 MG tablet Take 2.5 mg by mouth 2 times daily  90 tablet 1     brimonidine (ALPHAGAN-P) 0.15 % ophthalmic solution Place 1 drop into both eyes 2 times daily        pravastatin (PRAVACHOL) 20 MG tablet Take 1 tablet (20 mg) by mouth daily 30 tablet 11     calcium-vitamin D (CALTRATE) 600-400 MG-UNIT per tablet Take 1 tablet by mouth daily        latanoprost (XALATAN) 0.005 % ophthalmic solution Place 1 drop into both eyes At Bedtime       timolol (TIMOPTIC) 0.5 % ophthalmic solution Place 1 drop into both eyes 2 times daily       TRAMADOL HCL PO Take 50 mg by mouth At Bedtime Patient takes scheduled       multivitamin, therapeutic with minerals (MULTI-VITAMIN) TABS Take 1 tablet by mouth daily.       order for DME Equipment being ordered: Walker Wheels () and Walker ()  Treatment Diagnosis: difficulty with gait 1 each 0     No facility-administered encounter medications on file as of 2/15/2017.       ASSESSMENT AND PLAN:   1.  Symptomatic paroxysmal atrial fibrillation associated with heart failure symptoms.  We had an extensive discussion about management of her atrial fibrillation.  Options are either antiarrhythmic therapy versus AV node ablation.  At this time, I am somewhat concerned about pursuing AV node ablation as she has moderate to severe mitral regurgitation, and I am afraid the severity of MR may get worse after she becomes pacemaker dependent.  Therefore, I favor an attempt with amiodarone first.      I recommend taking  amiodarone 400 mg a day for a week and then decrease to 200 mg a day.  We will also continue metoprolol at current dose.  She will follow up with me in a month to reevaluate symptoms.  If symptoms are improved, we will obtain amio labs at that time.  On the other hand, if her symptoms are not improved, we will consider AV node ablation.   2.  Embolic prevention.  CHADS-VASc score of 5.  Continue anticoagulation with Eliquis indefinitely.   3.  Device care.  Continue device checks q. 3 months.   4.  Valve disease (moderate to severe TR, MR and AS), will continue current therapy.               Again, thank you for allowing me to participate in the care of your patient.      Sincerely,    Praful Monae MD     [facility]

## 2017-02-15 NOTE — LETTER
2/15/2017    [facility]   [address]  [address]  [facility] [address]    RE: Rebecca Helms       Dear Colleague,    I had the pleasure of seeing Rebecca Helms in the [facility].    PRIMARY CARDIOLOGIST:  Dr. Warren, recently seen by Dr. White and Dr. Monae.      REASON FOR VISIT:  C.O.R.E. Clinic followup.      Mrs. Helms is a delightful, 88-year-old woman with a complex past cardiac history as follows:   1.  Paroxysmal atrial fibrillation with intermittent tachycardia, on Eliquis for anticoagulation.   2.  Sinus node dysfunction with permanent pacemaker in place.   3.  Hypertension.   4.  Dyslipidemia.   5.  Carotid stenosis.   6.  Moderate to severe aortic stenosis, moderate to severe mitral regurgitation and moderate to severe tricuspid regurgitation.   7.  Hypertension.      I met her several weeks ago when she had described having a few tough couple months.  She has been struggling with these spells that are of profound fatigue and overwhelming doom.  She had been considered for a TAVR, but her aortic stenosis was not felt to be severe enough.  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  At this point, she was found to be in AFib with RVR.  This combined with her valvular disease seemed to have contributed to this.  Over the last several weeks, there has been some confusion about medications and unfortunately an ER visit in December and then hospitalization again in January.  At one point, she was diuresed down to a home weight of about 113 pounds, but this has slowly been increasing since discharge.  She was seen by Dr. White, who recommended consideration of AV node ablation and pacemaker. She was seen today by Dr. Monae.  He started her on amiodarone for her to maintain sinus rhythm or at least prevent tachycardia prior to taking more  aggressive measures.      She tells me today that today she feels okay.  She does feel like she is retaining fluid.  Her legs are okay in the morning, but very bad by the end of the day.  She has ongoing shortness of breath.  She has to stop walking from her living room to her kitchen when she carries a meal in there, and this is not a very far distance.  Her weight is up from 113 pounds; it was 120 pounds last week and today is 123 pounds.  She does not notice an increase in shortness of breath with this.  She does notice some lower abdominal swelling and peripheral edema.  She has not had an episode where she has a feeling of doom since 1 week ago.      SOCIAL HISTORY:  She has 2 daughters, one in [place] and the other in [facility], I believe.  She has a niece, Tavia, who is a HUC on the 5th floor who also tries to help out with her meals.  She lives in the [address] [place] [address] and co-op.      PHYSICAL EXAMINATION:   GENERAL:  Elderly, well-developed, well-nourished woman in no acute distress.   HEENT:  Normocephalic, atraumatic.   HEART:  Regular with a 4/6 systolic murmur heard best at the right sternal border, but also throughout the precordium and into the apex.   LUNGS:  Diminished bilaterally with some crackles in bilateral lower lobes.   EXTREMITIES:  With 2+ pitting edema to her thighs.  At 90 degrees, she has about 1 cm JVD.     Outpatient Encounter Prescriptions as of 2/15/2017   Medication Sig Dispense Refill     [DISCONTINUED] amiodarone (PACERONE/CODARONE) 200 MG tablet Take 1 tablet (200 mg) by mouth 2 times daily 30 tablet 3     [DISCONTINUED] torsemide (DEMADEX) 20 MG tablet Take 1 tablet (20 mg) by mouth daily 30 tablet 3     metoprolol (TOPROL-XL) 25 MG 24 hr tablet Take 3 tablets (75 mg) by mouth daily 180 tablet 3     acetaminophen (TYLENOL ARTHRITIS PAIN) 650 MG CR tablet Take 650 mg by mouth daily       lisinopril (PRINIVIL/ZESTRIL) 10 MG tablet Take 1 tablet (10 mg) by mouth daily at night        order for DME Equipment being ordered: Walker Wheels () and Walker ()  Treatment Diagnosis: difficulty with gait 1 each 0     moxifloxacin (VIGAMOX) 0.5 % ophthalmic solution Place 1 drop Into the left eye daily as needed (eye infections)        apixaban ANTICOAGULANT (ELIQUIS) 2.5 MG tablet Take 2.5 mg by mouth 2 times daily  90 tablet 1     brimonidine (ALPHAGAN-P) 0.15 % ophthalmic solution Place 1 drop into both eyes 2 times daily        pravastatin (PRAVACHOL) 20 MG tablet Take 1 tablet (20 mg) by mouth daily 30 tablet 11     calcium-vitamin D (CALTRATE) 600-400 MG-UNIT per tablet Take 1 tablet by mouth daily        latanoprost (XALATAN) 0.005 % ophthalmic solution Place 1 drop into both eyes At Bedtime       timolol (TIMOPTIC) 0.5 % ophthalmic solution Place 1 drop into both eyes 2 times daily       TRAMADOL HCL PO Take 50 mg by mouth At Bedtime Patient takes scheduled       multivitamin, therapeutic with minerals (MULTI-VITAMIN) TABS Take 1 tablet by mouth daily.       [DISCONTINUED] torsemide (DEMADEX) 10 MG tablet Take 1 tablet (10 mg) by mouth daily 30 tablet 3     [DISCONTINUED] furosemide (LASIX) 40 MG tablet Take 1 tablet (40 mg) by mouth daily And  as directed by [facility] 100 tablet 3     No facility-administered encounter medications on file as of 2/15/2017.       ASSESSMENT AND PLAN:  Chronic heart failure with preserved EF, mostly secondary to moderate to severe MR, moderate to severe TR, as well as aortic stenosis.  It has been evaluated by the TAVR team.  It was not felt that her aortic stenosis was severe enough to require replacement.  We do think her recent episodes are caused by AFib with RVR.  There was also at some point some concern about her taking the appropriate dose of Toprol, and there was a feeling that she was under-dosed.  This was squared away last week with Dr. White.  In addition, she was started on amiodarone today by Dr. Monae in the hopes that we could avoid  a procedure if necessary.  In the meantime, I do think she has some volume, although it is unclear what her good dry weight is; she insists it is maybe around 116-118 pounds.  I am going to keep her on an equivalent dose, but just switch her to torsemide, which will be better absorbed through the gut.  I am going to stop her Lasix 40 mg daily and switch her to torsemide 10 mg daily.      She does have followup with Elisabeth Chakraborty in about 10 days with a device check at that time.  She also has a BMP at that time.  We will keep a close eye on her with [employer] between now and then.  We may need to add a heart failure visit between now and then, but at this time we will follow.  She is very astute and does call in her weights and symptoms.      Thank you for allowing me to participate in this delightful patient's care.  We will continue working on getting her stabilized, although a TAVR and a mitral clip certainly remain possibilities as well.     Sincerely,    Maday Diaz PA-C     [facility]

## 2017-02-16 NOTE — PROGRESS NOTES
PRIMARY CARDIOLOGIST:  Dr. Warren, recently seen by Dr. White and Dr. Monae.      REASON FOR VISIT:  C.O.R.E. Clinic followup.      HISTORY OF PRESENT ILLNESS:  Mrs. Helms is a delightful, 88-year-old woman with a complex past cardiac history as follows:   1.  Paroxysmal atrial fibrillation with intermittent tachycardia, on Eliquis for anticoagulation.   2.  Sinus node dysfunction with permanent pacemaker in place.   3.  Hypertension.   4.  Dyslipidemia.   5.  Carotid stenosis.   6.  Moderate to severe aortic stenosis, moderate to severe mitral regurgitation and moderate to severe tricuspid regurgitation.   7.  Hypertension.      I met her several weeks ago when she had described having a few tough couple months.  She has been struggling with these spells that are of profound fatigue and overwhelming doom.  She had been considered for a TAVR, but her aortic stenosis was not felt to be severe enough.  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  At this point, she was found to be in AFib with RVR.  This combined with her valvular disease seemed to have contributed to this.  Over the last several weeks, there has been some confusion about medications and unfortunately an ER visit in December and then hospitalization again in January.  At one point, she was diuresed down to a home weight of about 113 pounds, but this has slowly been increasing since discharge.  She was seen by Dr. White, who recommended consideration of AV node ablation and pacemaker. She was seen today by Dr. Monae.  He started her on amiodarone for her to maintain sinus rhythm or at least prevent tachycardia prior to taking more aggressive measures.      She tells me today that today she feels okay.  She does feel like she is retaining fluid.  Her legs are okay in the morning, but very bad by the end of the day.  She has ongoing shortness of breath.   She has to stop walking from her living room to her kitchen when she carries a meal in there, and this is not a very far distance.  Her weight is up from 113 pounds; it was 120 pounds last week and today is 123 pounds.  She does not notice an increase in shortness of breath with this.  She does notice some lower abdominal swelling and peripheral edema.  She has not had an episode where she has a feeling of doom since 1 week ago.      SOCIAL HISTORY:  She has 2 daughters, one in [place] and the other in [facility], [place] believe.  She has a niece, Tavia, who is a HUC on the 5th floor who also tries to help out with her meals.  She lives in the [address] [place] [address] and co-op.      PHYSICAL EXAMINATION:   GENERAL:  Elderly, well-developed, well-nourished woman in no acute distress.   HEENT:  Normocephalic, atraumatic.   HEART:  Regular with a 4/6 systolic murmur heard best at the right sternal border, but also throughout the precordium and into the apex.   LUNGS:  Diminished bilaterally with some crackles in bilateral lower lobes.   EXTREMITIES:  With 2+ pitting edema to her thighs.  At 90 degrees, she has about 1 cm JVD.      ASSESSMENT AND PLAN:  Chronic heart failure with preserved EF, mostly secondary to moderate to severe MR, moderate to severe TR, as well as aortic stenosis.  It has been evaluated by the TAVR team.  It was not felt that her aortic stenosis was severe enough to require replacement.  We do think her recent episodes are caused by AFib with RVR.  There was also at some point some concern about her taking the appropriate dose of Toprol, and there was a feeling that she was under-dosed.  This was squared away last week with Dr. White.  In addition, she was started on amiodarone today by Dr. Monae in the hopes that we could avoid a procedure if necessary.  In the meantime, I do think she has some volume, although it is unclear what her good dry weight is; she insists it is maybe around 116-118 pounds.  I  am going to keep her on an equivalent dose, but just switch her to torsemide, which will be better absorbed through the gut.  I am going to stop her Lasix 40 mg daily and switch her to torsemide 10 mg daily.      She does have followup with Elisabeth Chakraborty in about 10 days with a device check at that time.  She also has a BMP at that time.  We will keep a close eye on her with [employer] between now and then.  We may need to add a heart failure visit between now and then, but at this time we will follow.  She is very astute and does call in her weights and symptoms.      Thank you for allowing me to participate in this delightful patient's care.  We will continue working on getting her stabilized, although a TAVR and a mitral clip certainly remain possibilities as well.         JASPER RUIZ PA-C             D: 02/15/2017 16:53   T: 2017 10:36   MT: everardo      Name:     ANTHONY KEMP   MRN:      [id number]        Account:      [id number]   :      1928           Service Date: 02/15/2017      Document: [id number]

## 2017-02-16 NOTE — TELEPHONE ENCOUNTER
TELEMANAGEMENT: Wt 122# today, down 1# from yesterday and now 4# above max wt parameter.  No symptoms reported on survey today.  Pt saw SULEMAN Zhong in clinic yesterday- OV note not yet available; however, can see that Farheen advised pt to discontinue lasix 40mg daily and start taking torsemide 20mg daily.  Pt also saw Dr. Monae yesterday and he added a new medication, Amiodarone.   Called pt to review medication changes made yesterday.  Pt reports she was told to stop lasix and start torsemide, but she has not received torsemide yet (should be delivered from [facility] today) and she is wondering if she should still take lasix until torsemide is received.  Advised pt to take lasix 40mg today since she does not have torsemide yet.  Also advised to start taking torsemide tomorrow if delivered today.  Pt verbalized understanding and agrees with this plan.  Pt denies further questions/concerns and reports feeling well today.  Will continue to monitor pt's wt/sx with TELEMANAGEMENT.

## 2017-02-16 NOTE — PROGRESS NOTES
REASON FOR VISIT:  Evaluation of paroxysmal atrial fibrillation.      HISTORY OF PRESENT ILLNESS:  Ms. Helms is a delightful 88-year-old lady with a history of hypertension, hyperlipidemia, paroxysmal atrial fibrillation, sick sinus syndrome (status post pacemaker implantation 3 years ago), heart failure and moderate to severe aortic stenosis who is here for evaluation of paroxysmal atrial fibrillation.      The patient has had several admissions in the hospital with heart failure exacerbation associated with AFib with RVR.  She was admitted on 12/08 and most recently 01/23.  She was evaluated by my colleague, Dr. White, and she was still complaining of having episodes of paroxysmal palpitations associated with shortness of breath, associated with recurrent episodes of AFib with RVR.  She was referred here for evaluation for second opinion regarding management of atrial fibrillation.      She denies any other symptoms such as lightheadedness, near-syncopal or syncopal episode.  Device was interrogated 02/09.  She has a 65% AFib burden with episodes of AFib with RVR.  Lead parameters seem to be stable.  She is only atrially paced previously 7% of the time and ventricular paced 10% of the time.  Nuclear stress test obtained in 12/2016 showed no ischemia.  Echocardiogram obtained in 02/2017 showed EF of 55%-60% with moderate to severe MR and moderately severe TR and moderate to severe aortic stenosis.      ASSESSMENT AND PLAN:   1.  Symptomatic paroxysmal atrial fibrillation associated with heart failure symptoms.  We had an extensive discussion about management of her atrial fibrillation.  Options are either antiarrhythmic therapy versus AV node ablation.  At this time, I am somewhat concerned about pursuing AV node ablation as she has moderate to severe mitral regurgitation, and I am afraid the severity of MR may get worse after she becomes pacemaker dependent.  Therefore, I favor an attempt with amiodarone first.       I recommend taking amiodarone 400 mg a day for a week and then decrease to 200 mg a day.  We will also continue metoprolol at current dose.  She will follow up with me in a month to reevaluate symptoms.  If symptoms are improved, we will obtain amio labs at that time.  On the other hand, if her symptoms are not improved, we will consider AV node ablation.   2.  Embolic prevention.  CHADS-VASc score of 5.  Continue anticoagulation with Eliquis indefinitely.   3.  Device care.  Continue device checks q. 3 months.   4.  Valve disease (moderate to severe TR, MR and AS), will continue current therapy.         JULI MERIDA MD             D: 02/15/2017 15:54   T: 2017 03:30   MT: CARLO      Name:     ANTHONY KEMP   MRN:      [id number]        Account:      [id number]   :      1928           Service Date: 02/15/2017      Document: [id number]

## 2017-02-17 NOTE — TELEPHONE ENCOUNTER
Received call from pt who reports that she did not receive torsemide yesterday from [facility] delivery service and wonders if she should continue taking lasix until torsemide is delivered.  Called and spoke with [facility] who reports that pt's torsemide will be delivered on Monday, 2/20.  Advised pt to continue taking lasix 40mg daily until she received torsemide on Monday.  Pt verbalized understanding and agrees with this plan.  Pt reports she is feeling well today.

## 2017-02-20 NOTE — TELEPHONE ENCOUNTER
Telemanagement: Wt conts to be above parameter at 122#. Her granddtr picked up her rx and she started torsemide 2/19. She said yesterday her legs were firm, but have softened today. She said she has occasional, brief SOB that's been ongoing. I told her we'd watch things early this wk to see if things are improving on new diuretic. I asked her to call sooner if sx worsen/with questions. Will review w/ SMore PAC at that time.

## 2017-02-22 NOTE — TELEPHONE ENCOUNTER
Ok to stop torsemide and switch back to lasix 40 mg daily.  Agree that she needs to have swollen area near her urethra evaluated by pcp.  Maday Diaz PA-C 2/22/2017 4:54 PM

## 2017-02-22 NOTE — TELEPHONE ENCOUNTER
Pt has not called into TELEMANAGEMENT in the last 2 days. Called pt, she states she forgot to call because she had a visit with PMD, Dr. Gipson today. Wt this am and yesterday still at 122#. No change since change from Lasix to torsemide on 2/19. Pt states she feels like she urinated better on the Lasix. Pt states she still has occasional brief episodes of SOB/dizziness. She had one this am, but did not last long. She still has swelling in lower legs. Pt states she has swelling right below where she urinates. This just started about 2 weeks ago. Pt states sometimes it swells and looks like a baseball. Pt states today it is the size of a pool ball. Asked pt if she discussed this with PMD today, she states she forgot. Per pt, PMD made no changes, and told her she looked fantastic and keep doing what she has been doing. Told pt she needs to call back to Dr. Gipson and speak with nurse and describe swelling like she described to me near her bottom. Pt agreed. Will route to [facility] to review wt/diuretic plan. Next OV 2/9 with Hola and a BMP. Nydia RN

## 2017-02-23 NOTE — TELEPHONE ENCOUNTER
TELEMANAGEMENT: Wt 121# today, down 1# from yesterday and now 3# above max wt parameter.  Pillows and dizziness reported on survey today.  Pt takes torsemide 20mg daily.  Attempted to call pt to review Tamarre's recommendations from yesterday and discuss sx reported on survey today, line is busy.  Will try calling back later today- reminder sent to CORE board. ZULLY Ruggiero 11:20 AM 2/23/2017

## 2017-02-23 NOTE — TELEPHONE ENCOUNTER
Spoke with pt, she did reach out to Dr. Gipson but has not heard back. Pt's home care nurse Mendy was at pt's home when I spoke to her. Spoke with home care nurse directly. She looked at swelling pt described by urethra, and unsure what it is. She also noted pt looks like she may have a yeast infection or something. Mendy will reach out to Dr. Gipson herself regarding concerns. Reviewed with Mendy for pt to stop torsemide, and restart Lasix 40mg daily. Mendy wrote it down and stated understanding. Rx for Lasix sent to pharmacy as requested. ZULLY Stafford

## 2017-02-23 NOTE — TELEPHONE ENCOUNTER
JON Brooke home care nurse called back (ph.[phone]). Pharmacy will not be able to get Lasix to pt until Saturday. Pt did not keep old bottle of Lasix, as nurse was afraid pt would mix them up. Told Mendy to have pt continue current torsemide until new Lasix received. Mendy stated understanding. Mendy will be seeing pt again Saturday, and will switch meds over at that time when she is there. ZULLY Stafford

## 2017-02-24 NOTE — TELEPHONE ENCOUNTER
TELEMANAGEMENT: Wt 121# today, same as yesterday, still 3# above max wt parameter.  No symptoms reported on survey today.   Called and spoke with pt. She reports feeling fine today and thinks swelling in legs is starting to improve today. She confirms that she took torsemide 20gm today.  She plans to stop taking torsemide tomorrow and restart taking lasix 40mg daily.  Will continue monitoring pt's wt/sx with TELEMANAGEMENT.  ZULLY Ruggiero 1:02 PM 2/24/2017

## 2017-02-27 NOTE — TELEPHONE ENCOUNTER
TELEMANAGEMENT: Wt 122#, up 1# from yesterday, still above max wt parameters with no symptoms reported. Pt takes Lasix 40mg daily (pt switched back from torsemide 20mg daily on 2/25, as she felt like she urinated better on Lasix). Next OV 3/9 with Hola and BMP. Called pt to review, no answer and unable to LVM. Will try back. ZULLY Stafford

## 2017-03-01 NOTE — TELEPHONE ENCOUNTER
pls adjust parameters to 117-123.  Very glad she is feeling better.  Maday Diaz PA-C 3/1/2017 8:47 AM

## 2017-03-01 NOTE — TELEPHONE ENCOUNTER
TELEMANAGEMENT: Wt 122#, same as yesterday, above parameters with no symptoms reported. See note from yesterday. Awaiting SMore's review. ZULLY Stafford

## 2017-03-03 NOTE — TELEPHONE ENCOUNTER
Received VM from Mendy [facility] RN ([phone]), stated she had question about amiodarone rx, pt has ongoing LE edema and ? If pt needs potassium supplement.    I LVM for Mendy asking her to return call re: amy, can send in rx if needed, have pt elevate legs whenever possible during day to help w/ edema and to call if worse than typical and that she doesn't need potassium supplement now as stable K+ level on recent BMP w/ recheck planned for 3/9.

## 2017-03-07 NOTE — TELEPHONE ENCOUNTER
"Called and spoke with pt who reports that her wt was 120# today.  Updated TELEMANAGEMENT with today's wt.  Pt reports she is not feeling SOB; however, did experience that \"funny feeling\" like she has experienced in the past (see Farheen's and Dr. Monae's OV notes from 2/15).  She reports funny feeling came on while she was cleaning and lasted only a few minutes before resolving on its own.  Pt has OV scheduled on 3/9 with Hola and MILADIS.  Will continue to monitor pt's wt/sx with TELEMANAGEMENT.  ZULLY Ruggiero 4:50 PM 3/7/2017      "

## 2017-03-07 NOTE — TELEPHONE ENCOUNTER
Attempted to contact pt again, no answer and no option to leave VM.  Will attempt to call pt again later today. ZULLY Ruggiero 11:46 AM 3/7/2017

## 2017-03-07 NOTE — TELEPHONE ENCOUNTER
TELEMANAGEMENT: Wt not recorded today, and pt reported SOB on survey.  Pt takes lasix 40mg daily.  Attempted to contact pt for today's wt and to discuss SOB reported on survey, no answer and no option to leave VM.  Will attempt to call pt again later today. ZULLY Ruggiero 9:04 AM 3/7/2017

## 2017-03-08 NOTE — TELEPHONE ENCOUNTER
Received VM from Mendy [facility] nurse. Pt has appointment with Hola tomorrow. Mendy requesting any changes or orders for medication changes be faxed to [facility] at [phone] after visit tomorrow, so that she can go out and assist pt in medication set up. Will route FYI to [facility] for visit tomorrow. Also Mendy said if any questions or concerns, she can be reached at [phone]. ZULLY Stafford

## 2017-03-09 NOTE — PROGRESS NOTES
Faxed up to date medication list to [facility], Attn Mendy at [phone]. Orders placed for follow up with Farheen end of March. Called pt and reviewed, scheduled her for 4/7 with Farheen and a BMP. Mailed copy of appointments to pt as requested. Faxed copy of appt info along with med list to Mendy at [facility] as well. ZULLY Stafford

## 2017-03-09 NOTE — PROGRESS NOTES
Glad to hear!  I can see her back in the end of march or early April as it looks like she has f/u with Dr. paredes in there as well.   CORE team- will you please arrange?  naveed

## 2017-03-09 NOTE — PROGRESS NOTES
HPI and Plan:   See dictation #[id number]    Orders Placed This Encounter   Procedures     EKG 12-lead complete w/read - Clinics (performed today)       Orders Placed This Encounter   Medications     amiodarone (PACERONE/CODARONE) 200 MG tablet     Sig: Take 1 tablet (200 mg) by mouth daily       Medications Discontinued During This Encounter   Medication Reason     amiodarone (PACERONE/CODARONE) 200 MG tablet Reorder         Encounter Diagnoses   Name Primary?     Paroxysmal a-fib (H)      Persistent atrial fibrillation (H)      Chronic diastolic CHF (congestive heart failure) (H) Yes     Sinus node dysfunction (H)        CURRENT MEDICATIONS:  Current Outpatient Prescriptions   Medication Sig Dispense Refill     amiodarone (PACERONE/CODARONE) 200 MG tablet Take 1 tablet (200 mg) by mouth daily       furosemide (LASIX) 40 MG tablet Take 1 tablet (40 mg) by mouth daily 30 tablet 3     metoprolol (TOPROL-XL) 25 MG 24 hr tablet Take 3 tablets (75 mg) by mouth daily 180 tablet 3     acetaminophen (TYLENOL ARTHRITIS PAIN) 650 MG CR tablet Take 650 mg by mouth daily       lisinopril (PRINIVIL/ZESTRIL) 10 MG tablet Take 1 tablet (10 mg) by mouth daily at night       moxifloxacin (VIGAMOX) 0.5 % ophthalmic solution Place 1 drop Into the left eye daily as needed (eye infections)        apixaban ANTICOAGULANT (ELIQUIS) 2.5 MG tablet Take 2.5 mg by mouth 2 times daily  90 tablet 1     brimonidine (ALPHAGAN-P) 0.15 % ophthalmic solution Place 1 drop into both eyes 2 times daily        pravastatin (PRAVACHOL) 20 MG tablet Take 1 tablet (20 mg) by mouth daily 30 tablet 11     calcium-vitamin D (CALTRATE) 600-400 MG-UNIT per tablet Take 1 tablet by mouth daily        latanoprost (XALATAN) 0.005 % ophthalmic solution Place 1 drop into both eyes At Bedtime       timolol (TIMOPTIC) 0.5 % ophthalmic solution Place 1 drop into both eyes 2 times daily       TRAMADOL HCL PO Take 50 mg by mouth At Bedtime Patient takes scheduled        multivitamin, therapeutic with minerals (MULTI-VITAMIN) TABS Take 1 tablet by mouth daily.       [DISCONTINUED] amiodarone (PACERONE/CODARONE) 200 MG tablet Take 1 tablet (200 mg) by mouth 2 times daily 30 tablet 3     order for DME Equipment being ordered: Walker Wheels () and Walker ()  Treatment Diagnosis: difficulty with gait 1 each 0       ALLERGIES   No Known Allergies    PAST MEDICAL HISTORY:  Past Medical History   Diagnosis Date     Aortic stenosis      moderate to severe     Back pain      CAD (coronary artery disease)      nonobstructive     Carotid stenosis      left side     Chronic diastolic CHF (congestive heart failure) (H)      Dyslipidemia      Glaucoma      HTN (hypertension)      Osteoporosis      Paroxysmal atrial fibrillation (H) 9/12/14     PM detected, <1 minute each time     Pulmonary hypertension (H)      moderate to severe     Sinus node dysfunction (H)      sp DDD PM     Syncope        PAST SURGICAL HISTORY:  Past Surgical History   Procedure Laterality Date     Gyn surgery       OVARIAN CYSTECTOMY     Ent surgery       TONSILLECTOMY/BILAT EAR SURGERY     Eye surgery       BILAT CATARACT-IOL     Dilation and curettage, hysteroscopy diagnostic, combined  1/16/2013     Procedure: COMBINED DILATION AND CURETTAGE, HYSTEROSCOPY DIAGNOSTIC;   DILATION AND CURETTAGE, HYSTEROSCOPY;  Surgeon: Ozzie Lo MD;  Location:  [facility]     Implant pacemaker       dual chamber       FAMILY HISTORY:  Family History   Problem Relation Age of Onset     Unknown/Adopted Mother      Myocardial Infarction Father      Heart Surgery Father        SOCIAL HISTORY:  Social History     Social History     Marital status:      Spouse name: N/A     Number of children: N/A     Years of education: N/A     Social History Main Topics     Smoking status: Never Smoker     Smokeless tobacco: None     Alcohol use Yes      Comment: OCC     Drug use: No     Sexual activity: Not Asked     Other Topics Concern  "    Caffeine Concern No     2-3 cups decaf coffee per day     Sleep Concern No     Weight Concern Yes     weight loss     Special Diet No     Exercise Yes     exercises at home     Social History Narrative       Review of Systems:  Skin:  Negative       Eyes:  Positive for glasses    ENT:  Negative      Respiratory:  Negative for shortness of breath;dyspnea on exertion;cough     Cardiovascular:  Negative for;syncope or near-syncope;lightheadedness;dizziness Positive for;fatigue;palpitations;edema;exercise intolerance    Gastroenterology: Positive for nausea    Genitourinary:  Positive for      Musculoskeletal:  Positive for      Neurologic:  Positive for memory problems Neuropathy  Psychiatric:  Negative      Heme/Lymph/Imm:  Negative      Endocrine:  Negative        Physical Exam:  Vitals: /70  Pulse 60  Ht 1.549 m (5' 1\")  Wt 54 kg (119 lb 1.6 oz)  BMI 22.5 kg/m2    Constitutional:  cooperative, alert and oriented, well developed, well nourished, in no acute distress        Skin:  warm and dry to the touch, no apparent skin lesions or masses noted        Head:  normocephalic, no masses or lesions        Eyes:  pupils equal and round;conjunctivae and lids unremarkable;sclera white        ENT:  no pallor or cyanosis, dentition good        Neck:  JVP normal transmitted murmur      Chest:  no use of accessory muscles;normal respiratory excursion basal rales pacemaker incision in the left infraclavicular area was well-healed      Cardiac: regular rhythm       systolic murmur;RUSB;throughout precordium          Abdomen:  abdomen soft   Vulvar edema    Vascular: not assessed this visit                                        Extremities and Back:  no deformities, clubbing, cyanosis, erythema observed   bilateral LE edema;2+     to knees and vulvar. Above knees look good daoy 3/9/17. ACE bandages placed    Neurological:  affect appropriate, oriented to time, person and place              "

## 2017-03-09 NOTE — PROGRESS NOTES
Call from Mendy with [facility], she received med list and appt sheet. Wanted to clarify Lasix 40mg daily to be unchanged and Amiodarone decreased to 200mg daily. Reviewed that is correct. Mendy stated understanding. ZULLY Stafford

## 2017-03-09 NOTE — LETTER
"3/9/2017    MD [facility]   [address] [address]  [facility] [address]    RE: Rebecca RANDLE Analia       Dear Colleague,    I had the pleasure of seeing Rebecca today when she came for followup of her paroxysmal atrial fibrillation.  She is a pleasant 88-year-old who has had sinus node dysfunction and is status post dual-chamber [employer] pacemaker in 09/2014.  Through device interrogations we have noted increasing incidences of paroxysmal atrial fibrillation, and occasionally she gets quite tachycardic.  She has been on Eliquis.  She recently met with Dr. Monae for consideration of AV node ablation versus amiodarone therapy for these episodes.  She has hypertension, dyslipidemia, mitral regurgitation and significant aortic stenosis for which she has been evaluated by the TAVR team.  She also sees Maday Diaz and Dr. White through [facility].      When I saw her back in December, she was complaining of \"doom\" sensations, feeling like she was almost going to go out.  Interestingly, we had her pacemaker checked, and there was absolutely no evidence that this correlated with any atrial fibrillation or other arrhythmic episodes.  We did a stress test and an echocardiogram.  She had significant valvular abnormalities and diastolic dysfunction as noted above but stress test in 12/2016 showed no evidence of ischemia.      She saw Dr. Monae in consultation on 02/15 for a second opinion to determine if AV node ablation may be beneficial given the concern that AFib might be causing episodes of \"doom\" as well as significant shortness of breath.  He was concerned that given her mitral regurgitation that increasing her percentage of RV pacing may actually cause a significant deterioration in the EF, making her mitral regurgitation more of an issue.  He thought initially to try amiodarone, and she was on 400 mg for 1 week and then now is on 200 mg daily.  She remains on metoprolol succinate " "75 mg daily as well.      Device interrogation today (remote) showed 50% A pacing and 5% V pacing.  Since her counters were cleared 1 month ago (02/09, with amiodarone started 2/15), she had had multiple episodes of atrial fibrillation, comprising roughly 14% of the time.  Episodes lasted up to 24 hours but nothing beyond that.  Ventricular rates during mode switching were  beats per minute per histogram.  She had 556 episodes lasting less than a minute, 160 episodes lasting anywhere from 1 minute to 1 hour, and 27 episodes lasting from 1 hour to 24 hours.  She was started on amiodarone 400 mg x1 week and 200 mg daily following that on 02/15.      Rebecca overall really feels like she is actually doing a bit better.  She thinks that her diuretic is \"really working\" and she has been urinating very frequently.  She states that she notes a significant improvement in her lower extremity edema which she had previously noted up to the thighs and now is just to the knees.  Her vulvar edema is also improved.  She has a home health nurse (Mendy) who has been wrapping her legs for her.  She thinks her breathing is improved; however, she was excited to \"sit down\" after she was brought back from the waiting room.      She denies chest pain, pressure or tightness.  She does not remember having any more \"doom episodes.\"      EKG today, which I overread, shows sinus rhythm with a first-degree AV block and PVCs with some nonspecific ST-T wave changes.  Device interrogation is as above, a BMP today showed a sodium of 137, potassium 3.7, BUN down to 25, creatinine down to 1.36, GFR up to 37.     Outpatient Encounter Prescriptions as of 3/9/2017   Medication Sig Dispense Refill     amiodarone (PACERONE/CODARONE) 200 MG tablet Take 1 tablet (200 mg) by mouth daily       furosemide (LASIX) 40 MG tablet Take 1 tablet (40 mg) by mouth daily 30 tablet 3     metoprolol (TOPROL-XL) 25 MG 24 hr tablet Take 3 tablets (75 mg) by mouth " "daily 180 tablet 3     acetaminophen (TYLENOL ARTHRITIS PAIN) 650 MG CR tablet Take 650 mg by mouth daily       lisinopril (PRINIVIL/ZESTRIL) 10 MG tablet Take 1 tablet (10 mg) by mouth daily at night       moxifloxacin (VIGAMOX) 0.5 % ophthalmic solution Place 1 drop Into the left eye daily as needed (eye infections)        apixaban ANTICOAGULANT (ELIQUIS) 2.5 MG tablet Take 2.5 mg by mouth 2 times daily  90 tablet 1     brimonidine (ALPHAGAN-P) 0.15 % ophthalmic solution Place 1 drop into both eyes 2 times daily        pravastatin (PRAVACHOL) 20 MG tablet Take 1 tablet (20 mg) by mouth daily 30 tablet 11     calcium-vitamin D (CALTRATE) 600-400 MG-UNIT per tablet Take 1 tablet by mouth daily        latanoprost (XALATAN) 0.005 % ophthalmic solution Place 1 drop into both eyes At Bedtime       timolol (TIMOPTIC) 0.5 % ophthalmic solution Place 1 drop into both eyes 2 times daily       TRAMADOL HCL PO Take 50 mg by mouth At Bedtime Patient takes scheduled       multivitamin, therapeutic with minerals (MULTI-VITAMIN) TABS Take 1 tablet by mouth daily.       [DISCONTINUED] amiodarone (PACERONE/CODARONE) 200 MG tablet Take 1 tablet (200 mg) by mouth 2 times daily 30 tablet 3     order for DME Equipment being ordered: Walker Wheels () and Walker ()  Treatment Diagnosis: difficulty with gait 1 each 0     No facility-administered encounter medications on file as of 3/9/2017.       ASSESSMENT AND PLAN:     1.  Paroxysmal atrial fibrillation.  There was concern that this was causing increasing episodes of \"doom\" (though device interrogation done back in December did not correlate these episodes with arrhythmia) as well as significant shortness of breath.  She does indeed get tachycardic with her atrial fibrillation.  She has been on amiodarone now since 02/15.  She was on a loading dose of 400 mg daily.  Her device interrogation today showed that she has continued to have episodes of atrial fibrillation, sometimes " even getting up to the 150s.      I spoke with Dr. Monae about this, and he would like to give her a few more weeks to load on this medication before reassessing to determine if she would require AV node ablation to go along with her and reprogramming of her pacemaker.  At this time, we will have her continue amiodarone therapy.  If her AFib burden has improved on amiodarone, Dr. Monae will likely continue amiodarone and she will require amiodarone labs/lung testing.      Other options include AV node ablation.  However, the concern is that with increased V pacing, she would run into more problems with cardiomyopathy and/or severe mitral regurgitation and may not respond well to this. This of course does remain an option if amiodarone does not improve her AFib burden.     Zandra from the Device Clinic came in and turned her MVP on to a nominal setting to provide hopefully a bit more rate response when she is up and active.      She will, of course, remain on Eliquis therapy.      2.  Heart failure with preserved ejection fraction.  I will forward her labs on to Maday.  These look better to me, and she really thinks that the furosemide 40 mg is working well.  She still has significant lower extremity and vulvar edema, but her breathing is better and she tells me that her edema is much improved (though still significant).      Maday will determine when she should be seen again in the [facility].  Otherwise, from a device interrogation standpoint, she is seen again 03/20 by Dr. Monae.     Again, thank you for allowing me to participate in the care of your patient.      Sincerely,    Lisbet Chakraborty PA-C     [facility]

## 2017-03-09 NOTE — PROGRESS NOTES
Maday/CORE - I saw pt in FU for pAFib and start of amiodarone.  She sees you and Dr. White for HFpEF and AS and MR.    She's on furosemide 40 mg daily and does TeleAssurance. She tells me today that her breathing is a bit better and she's having increased urination on current dose, which she's pleased about.    Her legs remained wrapped in ACE bandages and she tells me swelling is down.    BMP looks better (2/15 showed BUN 42 and Cr 1.6 with GFR 30) and today's showed BUN 25 ad Cr 1.36. GFR 37    I did not change her Lasix 40 mg daily dose    Pls let her know (through Mendy) when you want to see her again for [facility].    Thx!  Elisabeth

## 2017-03-09 NOTE — PROGRESS NOTES
[employer] (D) Remote PPM Device Check  AP: 50 % : 5 %  Mode: DDD        Presenting Rhythm: AFIB with controlled  and VS events   Heart Rate: Adequate rates per histogram, majority of rates in the 60's  Sensing: Stable    Pacing Threshold: Stable    Impedance: Stable  Battery Status: 7.5 years  Atrial Arrhythmia: Since counters were cleared on 2/9/17 patient has had 556 episodes lasting <1 minute, 160 episodes lasting 1 minute- <1 hour, and 27 episodes lasting 1 hour- <24 hours all comprising 14% of the time. Ventricular rates during mode switching were  50-150bpm per histogram. 7 EGMs for review show PAT/PAF. Atrial rates 60- >250bpm per histogram. Patient taking Eliquis.    Ventricular Arrhythmia: 1 ventricular high rate. EGM shows As>Vs RVR lasting 6 beats. Ventricular rates 145-170bpm. Reviewed findings with SLangenbrunner,RN.       Care Plan: F/u PPM [place] q 3 months. No answer and unable to leave . Sent letter with results. HARINDER Zabala

## 2017-03-09 NOTE — PROGRESS NOTES
[employer] Dual Pacemaker Device Check/ COURTESY/ NO CHARGE/ PT SEEING MIRTHA:    Interrogation per Mirtha to erase counters.  Remote device transmission sent prior to visit.  Pt concerned re: inability to walk any appreciable distance.  Rate response NOT active. Turned on; using Minute Ventilation at nominal setting. She will call if any concerns. She is otherwise seeing Dr Monae on 3-20-17. SueLangenbrunnerRN

## 2017-03-09 NOTE — MR AVS SNAPSHOT
After Visit Summary   3/9/2017    Rebecca Helms    MRN: [id number]           Patient Information     Date Of Birth          6/18/1928        Visit Information        Provider Department      3/9/2017 2:00 PM [facility] TE[facility]1 [facility]        Today's Diagnoses     Cardiac pacemaker in situ    -  1       Follow-ups after your visit        Your next 10 appointments already scheduled     Mar 09, 2017 11:30 AM CST   LAB with [facility] LAB   [facility] ([facility]    [address] [place] [id number] [phone]           Patient must bring picture ID.  Patient should be prepared to give a urine specimen  Please do not eat 10-12 hours before your appointment if you are coming in fasting for labs on lipids, cholesterol, or glucose (sugar).  Pregnant women should follow their Care Team instructions. Water with medications is okay. Do not drink coffee or other fluids.   If you have concerns about taking  your medications, please ask at office or if scheduling via [employer], send a message by clicking on Secure Messaging, Message Your Care Team.            Mar 09, 2017 12:30 PM CST   Return Visit with Lisbet Chakraborty PA-C   [facility] ([facility])    [address] [place] [id number] [phone]            Mar 09, 2017  2:00 PM CST   Remote PPM Check with MADRID TECH1   [facility] ([facility])    [address] [place], [place] [id number]  [facility] [id number] [phone]           This appointment is for a remote check of your pacemaker.  This is not an appointment at the office.            Mar 20, 2017  1:15 PM CDT   [facility] EP RETURN with Praful Monae MD   [facility] ([facility])    [address] [place] [id number]    [phone]              Who to contact     If you have questions or need follow up information about today's clinic visit or your schedule please contact [facility] [place] directly at [phone].  Normal or non-critical lab and imaging results will be communicated to you by MyChart, letter or phone within 4 business days after the clinic has received the results. If you do not hear from us within 7 days, please contact the clinic through MyChart or phone. If you have a critical or abnormal lab result, we will notify you by phone as soon as possible.  Submit refill requests through [employer] or call your pharmacy and they will forward the refill request to us. Please allow 3 business days for your refill to be completed.          Additional Information About Your Visit        [employer] ([employer]hart Information     [employer] gives you secure access to your electronic health record. If you see a primary care provider, you can also send messages to your care team and make appointments. If you have questions, please call your primary care clinic.  If you do not have a primary care provider, please call [phone] and they will assist you.        Care EveryWhere ID     This is your Care EveryWhere ID. This could be used by other organizations to access your [facility] medical records  [id number]         Blood Pressure from Last 3 Encounters:   02/15/17 137/89   02/15/17 137/89   02/10/17 (!) 115/91    Weight from Last 3 Encounters:   02/15/17 56.2 kg (124 lb)   02/15/17 56.6 kg (124 lb 11.2 oz)   02/10/17 56.3 kg (124 lb 3.2 oz)              We Performed the Following     INTERROGATION DEVICE EVAL REMOTE, PACER/ICD (24220)     PM DEVICE INTERROGATE REMOTE (62151)        Primary Care Provider Office Phone # Fax #    Ren Gipson [phone]       [facility] [address]  [address]  [facility] [address]        Thank you!     Thank you for choosing [facility]  [facility] [place]  for your care. Our goal is always to provide you with excellent care. Hearing back from our patients is one way we can continue to improve our services. Please take a few minutes to complete the written survey that you may receive in the mail after your visit with us. Thank you!             Your Updated Medication List - Protect others around you: Learn how to safely use, store and throw away your medicines at www.disposemymeds.org.          This list is accurate as of: 3/9/17 11:26 AM.  Always use your most recent med list.                   Brand Name Dispense Instructions for use    amiodarone 200 MG tablet    PACERONE/CODARONE    30 tablet    Take 1 tablet (200 mg) by mouth 2 times daily       apixaban ANTICOAGULANT 2.5 MG tablet    ELIQUIS    90 tablet    Take 2.5 mg by mouth 2 times daily       brimonidine 0.15 % ophthalmic solution    ALPHAGAN-P     Place 1 drop into both eyes 2 times daily       calcium-vitamin D 600-400 MG-UNIT per tablet    CALTRATE     Take 1 tablet by mouth daily       furosemide 40 MG tablet    LASIX    30 tablet    Take 1 tablet (40 mg) by mouth daily       latanoprost 0.005 % ophthalmic solution    XALATAN     Place 1 drop into both eyes At Bedtime       lisinopril 10 MG tablet    PRINIVIL/ZESTRIL     Take 1 tablet (10 mg) by mouth daily at night       metoprolol 25 MG 24 hr tablet    TOPROL-XL    180 tablet    Take 3 tablets (75 mg) by mouth daily       moxifloxacin 0.5 % ophthalmic solution    VIGAMOX     Place 1 drop Into the left eye daily as needed (eye infections)       Multi-vitamin Tabs tablet      Take 1 tablet by mouth daily.       order for DME     1 each    Equipment being ordered: Walker Wheels () and Walker () Treatment Diagnosis: difficulty with gait       pravastatin 20 MG tablet    PRAVACHOL    30 tablet    Take 1 tablet (20 mg) by mouth daily       timolol 0.5 % ophthalmic solution    TIMOPTIC     Place 1 drop into  both eyes 2 times daily       TRAMADOL HCL PO      Take 50 mg by mouth At Bedtime Patient takes scheduled       TYLENOL ARTHRITIS PAIN 650 MG CR tablet   Generic drug:  acetaminophen      Take 650 mg by mouth daily

## 2017-03-09 NOTE — PATIENT INSTRUCTIONS
1. Pacemaker check today showed that you're usually in normal rhythm BUT you have continued to have episodes of atrial fibrillation (funny rhythm from top of heart) since starting amiodarone.  When you have these funny heart rhythms, your heart rate goes very fast sometimes.    2. I spoke with Dr. Monae about the fact you are still having some funny heart rhythms despite this medication.  He has recommended we CONTINUE amiodarone 200 mg DAILY (no longer taking 2 x per day).    3. Zandra from the [facility] Clinic has reset the counters on the device and made a few adjustments to see if this improves how you're feeling.  You will get your pacemaker re-checked when you see Dr. Monae on 3/20.  If still having AFib, will talk about going ahead with AV Node Ablation.      4. If you think that you're doing WORSE after these changes, CALL US!  [phone]    5. I'll show Maday your blood work, but everything looks good to me!

## 2017-03-10 NOTE — PROGRESS NOTES
"HISTORY OF PRESENT ILLNESS:  I had the pleasure of seeing Rebecca today when she came for followup of her paroxysmal atrial fibrillation.  She is a pleasant 88-year-old who has had sinus node dysfunction and is status post dual-chamber [employer] pacemaker in 09/2014.  Through device interrogations we have noted increasing incidences of paroxysmal atrial fibrillation, and occasionally she gets quite tachycardic.  She has been on Eliquis.  She recently met with Dr. Monae for consideration of AV node ablation versus amiodarone therapy for these episodes.  She has hypertension, dyslipidemia, mitral regurgitation and significant aortic stenosis for which she has been evaluated by the TAVR team.  She also sees Maday Diaz and Dr. White through [facility].      When I saw her back in December, she was complaining of \"doom\" sensations, feeling like she was almost going to go out.  Interestingly, we had her pacemaker checked, and there was absolutely no evidence that this correlated with any atrial fibrillation or other arrhythmic episodes.  We did a stress test and an echocardiogram.  She had significant valvular abnormalities and diastolic dysfunction as noted above but stress test in 12/2016 showed no evidence of ischemia.      She saw Dr. Monae in consultation on 02/15 for a second opinion to determine if AV node ablation may be beneficial given the concern that AFib might be causing episodes of \"doom\" as well as significant shortness of breath.  He was concerned that given her mitral regurgitation that increasing her percentage of RV pacing may actually cause a significant deterioration in the EF, making her mitral regurgitation more of an issue.  He thought initially to try amiodarone, and she was on 400 mg for 1 week and then now is on 200 mg daily.  She remains on metoprolol succinate 75 mg daily as well.      Device interrogation today (remote) showed 50% A pacing and 5% V pacing.  Since her counters " "were cleared 1 month ago (02/09, with amiodarone started 2/15), she had had multiple episodes of atrial fibrillation, comprising roughly 14% of the time.  Episodes lasted up to 24 hours but nothing beyond that.  Ventricular rates during mode switching were  beats per minute per histogram.  She had 556 episodes lasting less than a minute, 160 episodes lasting anywhere from 1 minute to 1 hour, and 27 episodes lasting from 1 hour to 24 hours.  She was started on amiodarone 400 mg x1 week and 200 mg daily following that on 02/15.      Rebecca overall really feels like she is actually doing a bit better.  She thinks that her diuretic is \"really working\" and she has been urinating very frequently.  She states that she notes a significant improvement in her lower extremity edema which she had previously noted up to the thighs and now is just to the knees.  Her vulvar edema is also improved.  She has a home health nurse (Mendy) who has been wrapping her legs for her.  She thinks her breathing is improved; however, she was excited to \"sit down\" after she was brought back from the waiting room.      She denies chest pain, pressure or tightness.  She does not remember having any more \"doom episodes.\"      EKG today, which I overread, shows sinus rhythm with a first-degree AV block and PVCs with some nonspecific ST-T wave changes.  Device interrogation is as above, a BMP today showed a sodium of 137, potassium 3.7, BUN down to 25, creatinine down to 1.36, GFR up to 37.        ASSESSMENT AND PLAN:     1.  Paroxysmal atrial fibrillation.  There was concern that this was causing increasing episodes of \"doom\" (though device interrogation done back in December did not correlate these episodes with arrhythmia) as well as significant shortness of breath.  She does indeed get tachycardic with her atrial fibrillation.  She has been on amiodarone now since 02/15.  She was on a loading dose of 400 mg daily.  Her device interrogation " today showed that she has continued to have episodes of atrial fibrillation, sometimes even getting up to the 150s.      I spoke with Dr. Monae about this, and he would like to give her a few more weeks to load on this medication before reassessing to determine if she would require AV node ablation to go along with her and reprogramming of her pacemaker.  At this time, we will have her continue amiodarone therapy.  If her AFib burden has improved on amiodarone, Dr. Monae will likely continue amiodarone and she will require amiodarone labs/lung testing.      Other options include AV node ablation.  However, the concern is that with increased V pacing, she would run into more problems with cardiomyopathy and/or severe mitral regurgitation and may not respond well to this. This of course does remain an option if amiodarone does not improve her AFib burden.     Zandra from the [facility] Clinic came in and turned her MVP on to a nominal setting to provide hopefully a bit more rate response when she is up and active.      She will, of course, remain on Eliquis therapy.      2.  Heart failure with preserved ejection fraction.  I will forward her labs on to Maday.  These look better to me, and she really thinks that the furosemide 40 mg is working well.  She still has significant lower extremity and vulvar edema, but her breathing is better and she tells me that her edema is much improved (though still significant).      Maday will determine when she should be seen again in the [facility].  Otherwise, from a device interrogation standpoint, she is seen again  by Dr. Monae.         ASHU OLIVARES PA-C             D: 2017 13:24   T: 03/10/2017 04:00   MT: CARLO      Name:     ANTHONY KEMP   MRN:      [id number]        Account:      [id number]   :      1928           Service Date: 2017      Document: [id number]

## 2017-03-13 NOTE — TELEPHONE ENCOUNTER
Received update from ZULLY De Paz in Device. Reviewed TELEMANAGEMENT, wt today is 116#, down 1# from yesterday, and now 1# below min wt parameters with SOB reported. Pt already spoke with ZULLY De Paz earlier today regarding symptoms. Pt takes Lasix 40mg daily (no recent change in dosage). Plan is to follow up with her tomorrow. Will continue to monitor and check on wt again in am. Next f/u with Dr. Monae on 3/20 and next f/u with C[facility] on 4/4. Will send FYI to [facility] as well. ZULLY Stafford

## 2017-03-13 NOTE — TELEPHONE ENCOUNTER
Call from pt in the early morning hours; she left a message. She was concerned re: intermittent SOB with and without activity. Early this AM she was awakened with her heart pounding and shortness of breath. It didn't last long. Over the past couple days she has noticed this with minor activity as well.  She saw Elisabeth Chakraborty on 3-9-17 for follow up AF/SOB. She continues on Amiodarone and follow routinely with CORE. Rate response was programmed ON at the visit.  Writer called pt back. At this time she has no complaints and is feeling well. Symptoms could be AF/RVR which she has frequently. Plan is to see if Amio will quiet this down.  Pt is OK to wait another day- I will follow up with her tomorrow.  If she feels worse again this afternoon, she will call back.  SueLangenbrunnerRN

## 2017-03-14 NOTE — TELEPHONE ENCOUNTER
TELEMANAGEMENT: Wt 116#, same as yesterday, below min wt parameters with no symptoms reported today. Will continue to monitor and send update to [facility] tomorrow. ZULLY Stafford

## 2017-03-15 NOTE — TELEPHONE ENCOUNTER
TELEMANAGEMENT: Wt 115#, down 1# from yesterday, still below wt parameters with SOB reported today. Pt takes Lasix 40mg daily. Next OV 3/20 with Dr. Monae and 4/7 with Farheen and MILADIS. Attempted to call pt, line busy. Will try back. ZULLY Stafford

## 2017-03-16 NOTE — TELEPHONE ENCOUNTER
TELEMANAGEMENT: Wt 116#, up 1# from yesterday, still below min wt parameters with no symptoms reported today. Pt takes Lasix 40mg daily. Next OV 3/20 with Dr. Monae and 4/7 with Farheen. Called pt, she is feeling well. She has not had any other episodes of SOB at night since last weekend. Will review wt parameters with Farheen. ZULLY Stafford

## 2017-03-20 NOTE — MR AVS SNAPSHOT
After Visit Summary   3/20/2017    Rebecca Helms    MRN: [id number]           Patient Information     Date Of Birth          6/18/1928        Visit Information        Provider Department      3/20/2017 1:00 PM [facility] DCR2 [facility]        Today's Diagnoses     Cardiac pacemaker in situ    -  1       Follow-ups after your visit        Your next 10 appointments already scheduled     Apr 07, 2017  2:10 PM CDT   LAB with [facility] LAB   [facility] ([facility])    [address] [place] [address] [address]  [facility] [id number] [phone]           Patient must bring picture ID.  Patient should be prepared to give a urine specimen  Please do not eat 10-12 hours before your appointment if you are coming in fasting for labs on lipids, cholesterol, or glucose (sugar).  Pregnant women should follow their Care Team instructions. Water with medications is okay. Do not drink coffee or other fluids.   If you have concerns about taking  your medications, please ask at office or if scheduling via IDxt, send a message by clicking on Secure Messaging, Message Your Care Team.            Apr 07, 2017  3:10 PM CDT   Core Return with Maday Diaz PA-C   [facility] ([facility])    [address] [place] [address] [address]  [facility] [id number] [phone]            Felix 15, 2017  4:45 PM CDT   Remote PPM Check with MADRID TECH1   [facility] ([facility])    [address] [place] [address] [address]  [facility] [id number] [phone]           This appointment is for a remote check of your pacemaker.  This is not an appointment at the office.              Who to contact     If you have questions or need follow up information about today's clinic visit or your schedule please contact [facility] directly at  [phone].  Normal or non-critical lab and imaging results will be communicated to you by MyChart, letter or phone within 4 business days after the clinic has received the results. If you do not hear from us within 7 days, please contact the clinic through [employer] or phone. If you have a critical or abnormal lab result, we will notify you by phone as soon as possible.  Submit refill requests through [employer] or call your pharmacy and they will forward the refill request to us. Please allow 3 business days for your refill to be completed.          Additional Information About Your Visit        [employer] Information     [employer] gives you secure access to your electronic health record. If you see a primary care provider, you can also send messages to your care team and make appointments. If you have questions, please call your primary care clinic.  If you do not have a primary care provider, please call [phone] and they will assist you.        Care EveryWhere ID     This is your Care EveryWhere ID. This could be used by other organizations to access your [place] medical records  [id number]         Blood Pressure from Last 3 Encounters:   03/20/17 142/78   03/09/17 112/70   02/15/17 137/89    Weight from Last 3 Encounters:   03/20/17 54.2 kg (119 lb 6.4 oz)   03/09/17 54 kg (119 lb 1.6 oz)   02/15/17 56.2 kg (124 lb)              Today, you had the following     No orders found for display       Primary Care Provider Office Phone # Fax #    Ren Gipson [phone]       [facility] [address]  [address]  [facility] [address]        Thank you!     Thank you for choosing [facility] [place]  for your care. Our goal is always to provide you with excellent care. Hearing back from our patients is one way we can continue to improve our services. Please take a few minutes to complete the written survey that you may receive in the mail after your visit  with us. Thank you!             Your Updated Medication List - Protect others around you: Learn how to safely use, store and throw away your medicines at www.disposemymeds.org.          This list is accurate as of: 3/20/17  1:35 PM.  Always use your most recent med list.                   Brand Name Dispense Instructions for use    amiodarone 200 MG tablet    PACERONE/CODARONE     Take 1 tablet (200 mg) by mouth daily       apixaban ANTICOAGULANT 2.5 MG tablet    ELIQUIS    90 tablet    Take 2.5 mg by mouth 2 times daily       brimonidine 0.15 % ophthalmic solution    ALPHAGAN-P     Place 1 drop into both eyes 2 times daily       calcium-vitamin D 600-400 MG-UNIT per tablet    CALTRATE     Take 1 tablet by mouth daily       furosemide 40 MG tablet    LASIX    30 tablet    Take 1 tablet (40 mg) by mouth daily       latanoprost 0.005 % ophthalmic solution    XALATAN     Place 1 drop into both eyes At Bedtime       lisinopril 10 MG tablet    PRINIVIL/ZESTRIL     Take 1 tablet (10 mg) by mouth daily at night       metoprolol 25 MG 24 hr tablet    TOPROL-XL    180 tablet    Take 3 tablets (75 mg) by mouth daily       moxifloxacin 0.5 % ophthalmic solution    VIGAMOX     Place 1 drop Into the left eye daily as needed (eye infections)       Multi-vitamin Tabs tablet      Take 1 tablet by mouth daily.       order for DME     1 each    Equipment being ordered: Walker Wheels () and Walker () Treatment Diagnosis: difficulty with gait       pravastatin 20 MG tablet    PRAVACHOL    30 tablet    Take 1 tablet (20 mg) by mouth daily       timolol 0.5 % ophthalmic solution    TIMOPTIC     Place 1 drop into both eyes 2 times daily       TRAMADOL HCL PO      Take 50 mg by mouth At Bedtime Patient takes scheduled       TYLENOL ARTHRITIS PAIN 650 MG CR tablet   Generic drug:  acetaminophen      Take 650 mg by mouth daily

## 2017-03-20 NOTE — PROGRESS NOTES
"[employer] PPM-Courtesy Check-No Charge  Pt here for histogram review and adjustment of rate response, secondary to intermittent episodes of SOB and weakness, decreased energy that pt describes as \"spells.\" States spells have decreased in frequency since seeing Elisabeth Chakraborty 3-9-17. Medications for A. Fib with RVR adjusted at that time and pt feels her symptoms have improved. Pt has difficulty describing symptoms. States episodes occur approximately 1 x weekly now, and can last 30-45 minutes. Last week during episode, \"I got weak all over my hands and body and I slid down the wall to the floor. I had to call the police to help me off the floor.\" Denies injury. Since last device check on 3-8-17, pt is 19% A. Paced and 26% V. Paced in mode DDDR 60. 1600 mode switches detected, comprising 51% of the total time, longest episodes logged as lasting 1-24 hrs. No new VHR's logged. Takes Eliquis. HR is blunted at 60 bpm, and pt ambulates very slowly using short steps and wheeled walker. Minute ventilation response factor made more aggressive from 7 to 8. Scheduled to see Dr. Monae today. Next Latitude scheduled for June. JORGE LUIS Jones RN.  "

## 2017-03-20 NOTE — PROGRESS NOTES
Pt has not called into telemanagement last 4 days. She's currently in our office for EP visit. Will watch for survey tomorrow.

## 2017-03-20 NOTE — PROGRESS NOTES
"HPI and Plan:   See dictation  [id number]    Physical Exam:  Vitals: /78  Pulse 66  Ht 1.549 m (5' 1\")  Wt 54.2 kg (119 lb 6.4 oz)  BMI 22.56 kg/m2    Constitutional:  AAO x3.  Pt is in NAD.  HEAD: normocephalic.  SKIN: Skin normal color, texture and turgor with no lesions or eruptions.  Eyes: PERRL, EOMI.  ENT:  Supple, normal JVP. No lymphadenopathy or thyroid enlargement.  Chest:  CTAB.  Cardiac:   RRR, normal  S1 and S2.   Systolic murmur in LUSB II/VI.  Abdomen:  Normal BS.  Soft, non-tender and non-distended.  No rebound or guarding.    Extremities:  Pedious pulses palpable B/L.   LE edema noticed (2+).   Neurological: Strength and sensation grossly symmetric and intact throughout.       CURRENT MEDICATIONS:  Current Outpatient Prescriptions   Medication Sig Dispense Refill     amiodarone (PACERONE/CODARONE) 200 MG tablet Take 1 tablet (200 mg) by mouth daily       furosemide (LASIX) 40 MG tablet Take 1 tablet (40 mg) by mouth daily 30 tablet 3     metoprolol (TOPROL-XL) 25 MG 24 hr tablet Take 3 tablets (75 mg) by mouth daily 180 tablet 3     acetaminophen (TYLENOL ARTHRITIS PAIN) 650 MG CR tablet Take 650 mg by mouth daily       lisinopril (PRINIVIL/ZESTRIL) 10 MG tablet Take 1 tablet (10 mg) by mouth daily at night       order for DME Equipment being ordered: Walker Wheels () and Walker ()  Treatment Diagnosis: difficulty with gait 1 each 0     moxifloxacin (VIGAMOX) 0.5 % ophthalmic solution Place 1 drop Into the left eye daily as needed (eye infections)        apixaban ANTICOAGULANT (ELIQUIS) 2.5 MG tablet Take 2.5 mg by mouth 2 times daily  90 tablet 1     brimonidine (ALPHAGAN-P) 0.15 % ophthalmic solution Place 1 drop into both eyes 2 times daily        pravastatin (PRAVACHOL) 20 MG tablet Take 1 tablet (20 mg) by mouth daily 30 tablet 11     calcium-vitamin D (CALTRATE) 600-400 MG-UNIT per tablet Take 1 tablet by mouth daily        latanoprost (XALATAN) 0.005 % ophthalmic solution Place 1 " drop into both eyes At Bedtime       timolol (TIMOPTIC) 0.5 % ophthalmic solution Place 1 drop into both eyes 2 times daily       TRAMADOL HCL PO Take 50 mg by mouth At Bedtime Patient takes scheduled       multivitamin, therapeutic with minerals (MULTI-VITAMIN) TABS Take 1 tablet by mouth daily.         ALLERGIES   No Known Allergies    PAST MEDICAL HISTORY:  Past Medical History   Diagnosis Date     Aortic stenosis      moderate to severe     Back pain      CAD (coronary artery disease)      nonobstructive     Carotid stenosis      left side     Chronic diastolic CHF (congestive heart failure) (H)      Dyslipidemia      Glaucoma      HTN (hypertension)      Osteoporosis      Paroxysmal atrial fibrillation (H) 9/12/14     PM detected, <1 minute each time     Pulmonary hypertension (H)      moderate to severe     Sinus node dysfunction (H)      sp DDD PM     Syncope        PAST SURGICAL HISTORY:  Past Surgical History   Procedure Laterality Date     Gyn surgery       OVARIAN CYSTECTOMY     Ent surgery       TONSILLECTOMY/BILAT EAR SURGERY     Eye surgery       BILAT CATARACT-IOL     Dilation and curettage, hysteroscopy diagnostic, combined  1/16/2013     Procedure: COMBINED DILATION AND CURETTAGE, HYSTEROSCOPY DIAGNOSTIC;   DILATION AND CURETTAGE, HYSTEROSCOPY;  Surgeon: Ozzie Lo MD;  Location:  [facility]     Implant pacemaker       dual chamber       FAMILY HISTORY:  Family History   Problem Relation Age of Onset     Unknown/Adopted Mother      Myocardial Infarction Father      Heart Surgery Father        SOCIAL HISTORY:  Social History     Social History     Marital status:      Spouse name: N/A     Number of children: N/A     Years of education: N/A     Social History Main Topics     Smoking status: Never Smoker     Smokeless tobacco: None     Alcohol use Yes      Comment: OCC     Drug use: No     Sexual activity: Not Asked     Other Topics Concern     Caffeine Concern No     2-3 cups decaf coffee per  day     Sleep Concern No     Weight Concern Yes     weight loss     Special Diet No     Exercise Yes     exercises at home     Social History Narrative       Review of Systems:  Skin:  Negative     Eyes:  Positive for glasses  ENT:  Negative    Respiratory:  Positive for shortness of breath;dyspnea on exertion  Cardiovascular:    Positive for;palpitations;edema  Gastroenterology: Negative    Genitourinary:  Negative    Musculoskeletal:  Positive for back pain  Neurologic:  Positive for memory problems  Psychiatric:  Negative    Heme/Lymph/Imm:  Negative    Endocrine:  Negative        Recent Lab Results:  LIPID RESULTS:  No results found for: CHOL, HDL, LDL, TRIG, CHOLHDLRATIO    LIVER ENZYME RESULTS:  Lab Results   Component Value Date    AST 46 (H) 02/15/2017    ALT 39 02/15/2017       CBC RESULTS:  Lab Results   Component Value Date    WBC 9.3 01/20/2017    RBC 4.11 01/20/2017    HGB 13.7 01/20/2017    HCT 40.7 01/20/2017    MCV 99 01/20/2017    MCH 33.3 (H) 01/20/2017    MCHC 33.7 01/20/2017    RDW 14.9 01/20/2017     01/20/2017       BMP RESULTS:  Lab Results   Component Value Date     03/09/2017    POTASSIUM 3.7 03/09/2017    CHLORIDE 100 03/09/2017    CO2 29 03/09/2017    ANIONGAP 11.7 03/09/2017    GLC 98 03/09/2017    BUN 25 03/09/2017    CR 1.36 (H) 03/09/2017    GFRESTIMATED 37 (L) 03/09/2017    GFRESTBLACK 44 (L) 03/09/2017    CARLO 10.0 03/09/2017        A1C RESULTS:  No results found for: A1C    INR RESULTS:  Lab Results   Component Value Date    INR 1.17 (H) 12/08/2016    INR 1.08 08/06/2011         ECHOCARDIOGRAM  Recent Results (from the past 8760 hour(s))   Echocardiogram Complete    Narrative    [id number]  [facility]19  [id number]  [id number]^MORE^JASPER^LUCAS VALDOVINOS        [facility]  [facility]  Echocardiography Laboratory  [address]  [place] [address]  Phone [phone]  Fax [phone]        Name: ANTHONY KEMP  MRN:  [id number]  : 1928  Study Date: 2017 10:32 AM  Age: 88 yrs  Gender: Female  [place] [facility] [place] Study: Chronic diastolic (congestive) heart failure  Ordering Physician: JASPER RUIZ  Referring Physician: MANJIT DIALLO  Performed By: Mitch Panhcal RDCS     BSA: 1.5 m2  Height: 61 in  Weight: 116 lb  HR: 96  BP: 96/60 mmHg  _____________________________________________________________________________  __     Procedure  Complete Echo Adult. Complete Portable Echo Adult.     _____________________________________________________________________________  __        Interpretation Summary     The left ventricle is normal in size.  The visual ejection fraction is estimated at 55-60%.  No regional wall motion abnormalities noted.  There is moderately severe (3+) mitral regurgitation.  There is mod-severe to severe (3-4+) tricuspid regurgitation.  Moderate (46-55mmHg) pulmonary hypertension is present.  Moderate to severe valvular aortic stenosis.  Rhythm suspicious for atrial fibrillation, please confirm with 12 lead ECG.  _____________________________________________________________________________  __        Left Ventricle  The left ventricle is normal in size. There is normal left ventricular wall  thickness. The visual ejection fraction is estimated at 55-60%. No regional  wall motion abnormalities noted.     Right Ventricle  There is a catheter/pacemaker lead seen in the right ventricle. The right  ventricle is normal in size and function.     Atria  The left atrium is severely dilated. The right atrium is moderately dilated.  There is no color Doppler evidence of an atrial shunt.     Mitral Valve  The mitral valve leaflets are mildly thickened. There is moderately severe  (3+) mitral regurgitation.     Tricuspid Valve  There is mod-severe to severe (3-4+) tricuspid regurgitation. The right  ventricular systolic pressure is approximated at 40.2 mmHg plus the right  atrial pressure. Normal  IVC (1.5-2.5cm) with <50% respiratory collapse; right  atrial pressure is estimated at 10-15mmHg. Moderate (46-55mmHg) pulmonary  hypertension is present.        Aortic Valve  No aortic regurgitation is present. The mean AoV pressure gradient is 8.0  mmHg. The calculated aortic valve are is 1.0 cm^2. Moderate to severe valvu  lar  aortic stenosis.     Pulmonic Valve  There is trace pulmonic valvular regurgitation.     Vessels  Normal size aorta. The aortic root is normal size.     Pericardium  Small pericardial effusion.     Rhythm  Rhythm suspicious for atrial fibrillation.     _____________________________________________________________________________  __  MMode/2D Measurements & Calculations  IVSd: 1.1 cm  LVIDd: 3.8 cm  LVIDs: 2.5 cm  LVPWd: 0.97 cm  FS: 32.6 %  EDV(Teich): 60.2 ml  ESV(Teich): 23.0 ml  LV mass(C)d: 120.9 grams  Ao root diam: 2.8 cm  LA dimension: 4.9 cm     asc Aorta Diam: 3.1 cm  LA/Ao: 1.7  LVOT diam: 1.9 cm  LVOT area: 2.9 cm2  LA Volume (BP): 89.5 ml  LA Volume Index (BP): 59.7 ml/m2        Doppler Measurements & Calculations  MV E max sagrario: 93.9 cm/sec  MV dec time: 0.15 sec  Ao V2 max: 198.0 cm/sec  Ao max PG: 15.7 mmHg  Ao V2 mean: 132.9 cm/sec  Ao mean P.0 mmHg  Ao V2 VTI: 38.1 cm  CHUCK(I,D): 1.0 cm2  CHUCK(V,D): 1.2 cm2  LV V1 max P.5 mmHg  LV V1 max: 78.3 cm/sec  LV V1 VTI: 13.7 cm     SV(LVOT): 39.8 ml  TR max sagrario: 316.9 cm/sec  TR max P.2 mmHg  CHUCK Index (cm2/m2): 0.70  Lateral E/e': 9.1  Medial E/e': 14.6           _____________________________________________________________________________  __           Report approved by: Venecia Spann 2017 01:41 PM      Echocardiogram    Narrative    Interpretation Summary              [facility]  [facility]  Echocardiography Laboratory  [address]  [place] [address]  Phone [phone]  Fax [phone]        Name: ANTHONY KEMP JER  MRN: [id number]  :  06/18/1928  Study Date: 12/07/2016 08:35 AM  Age: 88 yrs  Gender: Female  [place] [facility] [place]:     Ordering Physician: ASHU OLIVARES  Referring Physician: ASHU OLIVARES  Performed By: Cecy Villa RDCS  BSA: 1.6 m2  Height: 64 in  Weight: 116 lb  HR: 64  BP: 163/78 mmHg     ______________________________________________________________________________     Procedure  Complete Echo Adult.     ______________________________________________________________________________     Interpretation Summary     The visual ejection fraction is estimated at 55-60%.  Left ventricular systolic function is normal.  Grade III or advanced diastolic dysfunction.  There is moderate (2+) mitral regurgitation.  There is moderate (2+) tricuspid regurgitation.  Right ventricular systolic pressure is elevated, consistent with moderate to  severe pulmonary hypertension.  Moderate to severe valvular aortic stenosis.  The calculated aortic valve area and gradients are very similar to 2014. The  relatively low gradients compared to the more severe calculated aortic valve  area were also noted in 2014. The 2 D appearance of the valve is more in  keeping with a more severely stenosed aortic valve and the low gradients may  represent low cardiac output.  ______________________________________________________________________________           Left Ventricle  The left ventricle is normal in size. There is mild concentric left  ventricular hypertrophy. The visual ejection fraction is estimated at 55-60%.  Left ventricular systolic function is normal. Grade III or advanced diastolic  dysfunction. E by E prime ratio is greater than 15, that likely suggests  increased left ventricular filling pressures.  Right Ventricle  There is a catheter/pacemaker lead seen in the right ventricle. The right  ventricle is normal in size and function.     Atria  The left atrium is mild to moderately dilated. The right atrium is mild  to  moderately dilated. There is no color Doppler evidence of an atrial shunt.     Mitral Valve  There is mild mitral annular calcification. There is moderate (2+) mitral  regurgitation.     Tricuspid Valve  There is moderate (2+) tricuspid regurgitation. The right ventricular  systolic pressure is approximated at 54.6 mmHg plus the right atrial  pressure. Normal IVC (1.5-2.5cm) with >50% respiratory collapse; right atrial  pressure is estimated at 5-10mmHg. Right ventricular systolic pressure is  elevated, consistent with moderate to severe pulmonary hypertension.     Aortic Valve  The aortic valve is not well visualized. The calculated aortic valve are is  0.83 cm^2. The mean AoV pressure gradient is 11.0 mmHg. The peak AoV pressure  gradient is 17.5 mmHg. Moderate to severe valvular aortic stenosis.     Pulmonic Valve  There is trace pulmonic valvular regurgitation.  Vessels  The IVC is normal in size and reactivity with respiration, suggesting normal  central venous pressure.     Pericardium  Trivial posterior pericardial effusion.     Rhythm  The rhythm was normal sinus.  ______________________________________________________________________________     MMode/2D Measurements & Calculations  IVSd: 1.2 cm  LVIDd: 3.9 cm  LVIDs: 2.9 cm  LVPWd: 1.1 cm  FS: 26.4 %  EDV(Teich): 65.2 ml  ESV(Teich): 31.1 ml  LV mass(C)d: 145.9 grams  Ao root diam: 2.6 cm  LA dimension: 4.2 cm  asc Aorta Diam: 3.1 cm  LA/Ao: 1.6  LVOT diam: 1.9 cm  LVOT area: 2.9 cm2  LA Volume (BP): 53.9 ml  LA Volume Index (BP): 34.8 ml/m2           Doppler Measurements & Calculations  MV E max sagrario: 100.9 cm/sec  MV A max sagrario: 34.0 cm/sec  MV E/A: 3.0  MV P1/2t max sagrario: 100.9 cm/sec  MV P1/2t: 59.8 msec  MVA(P1/2t): 3.7 cm2  MV dec slope: 494.0 cm/sec2  MV dec time: 0.20 sec  Ao V2 max: 209.2 cm/sec  Ao max P.5 mmHg  Ao V2 mean: 155.5 cm/sec  Ao mean P.0 mmHg  Ao V2 VTI: 57.1 cm  CHUCK(I,D): 0.83 cm2  CHUCK(V,D): 0.97 cm2  LV V1 max P.9  mmHg  LV V1 max: 68.9 cm/sec  LV V1 VTI: 16.1 cm  SV(LVOT): 47.2 ml  PA acc time: 0.08 sec  TR max sagrario: 369.5 cm/sec  TR max P.6 mmHg  CHUCK Index (cm2/m2): 0.53  Lateral E/e': 15.2  Medial E/e': 17.5              ______________________________________________________________________________        Report approved by: Venecia Alexander 2016 09:37 AM            No orders of the defined types were placed in this encounter.    No orders of the defined types were placed in this encounter.    There are no discontinued medications.      Encounter Diagnosis   Name Primary?     Persistent atrial fibrillation (H)          CC  MD RENETTA Sanders [facility] MANAGEMENT [id number]   [address]  [place] [address]

## 2017-03-20 NOTE — LETTER
3/20/2017    Ren Gipson MD  [facility]  [address] [place] [address]    RE: Rebecca Helms       Dear Colleague,    I had the pleasure of seeing Rebecca Helms in the [facility].    REASON FOR VISIT:  Evaluation of AFib.      Ms. Helms is a delightful 88-year-old lady with a history of hypertension, hyperlipidemia, paroxysmal atrial fibrillation, sick sinus syndrome (pacemaker implantation 3 years ago), and heart failure secondary to moderately severe aortic stenosis who is here for evaluation of paroxysmal atrial fibrillation.        The patient has had several admissions in the hospital for heart failure exacerbation associated with AFib with RVR.  She was admitted on 12/08 and was seen on 01/23.  She was evaluated by my colleague Dr. White and was still complaining of having episodes of palpitations associated with shortness of breath.  She was referred to me for a second opinion for management of atrial fibrillation.        I saw her on 02/15, and at that time I decided to start her on amiodarone therapy to prevent recurrence of AFib.       She informs that despite amiodarone, she is still having episodes of paroxysmal atrial fibrillation with palpitations associated with shortness of breath.  Last week she actually had 1 episode that woke her up during the morning.      She denies any other symptoms such as chest pain or syncope; however, she does affirm becoming extremely lightheaded when she has episodes of palpitations.      Nuclear stress test obtained in 12/16 ruled out ischemia.  Echocardiogram obtained 02/17 showed EF of 55%-60% with moderate to severe MR, moderate to severe TR and moderate to severe aortic stenosis.      Outpatient Encounter Prescriptions as of 3/20/2017   Medication Sig Dispense Refill     [DISCONTINUED] amiodarone (PACERONE/CODARONE) 200 MG tablet Take 1 tablet (200 mg) by mouth daily       [DISCONTINUED] furosemide (LASIX)  40 MG tablet Take 1 tablet (40 mg) by mouth daily (Patient taking differently: Take 40 mg by mouth daily Holds for wt <112lbs) 30 tablet 3     [DISCONTINUED] metoprolol (TOPROL-XL) 25 MG 24 hr tablet Take 3 tablets (75 mg) by mouth daily 180 tablet 3     acetaminophen (TYLENOL ARTHRITIS PAIN) 650 MG CR tablet Take 1,300 mg by mouth 2 times daily        [DISCONTINUED] lisinopril (PRINIVIL/ZESTRIL) 10 MG tablet Take 1 tablet (10 mg) by mouth daily at night       order for DME Equipment being ordered: Walker Wheels () and Walker ()  Treatment Diagnosis: difficulty with gait 1 each 0     moxifloxacin (VIGAMOX) 0.5 % ophthalmic solution Place 1 drop Into the left eye daily as needed (eye infections)        apixaban ANTICOAGULANT (ELIQUIS) 2.5 MG tablet Take 2.5 mg by mouth 2 times daily  90 tablet 1     brimonidine (ALPHAGAN-P) 0.15 % ophthalmic solution Place 1 drop into both eyes 2 times daily        pravastatin (PRAVACHOL) 20 MG tablet Take 1 tablet (20 mg) by mouth daily (Patient taking differently: Take 20 mg by mouth every evening ) 30 tablet 11     calcium-vitamin D (CALTRATE) 600-400 MG-UNIT per tablet Take 1 tablet by mouth daily        latanoprost (XALATAN) 0.005 % ophthalmic solution Place 1 drop into both eyes At Bedtime       timolol (TIMOPTIC) 0.5 % ophthalmic solution Place 1 drop into both eyes 2 times daily       TRAMADOL HCL PO Take 50 mg by mouth At Bedtime Patient takes scheduled       multivitamin, therapeutic with minerals (MULTI-VITAMIN) TABS Take 1 tablet by mouth daily.       No facility-administered encounter medications on file as of 3/20/2017.       ASSESSMENT AND PLAN:    1.  Symptomatic paroxysmal atrial fibrillation associated with heart failure symptoms.  We attempted amiodarone for rhythm control strategy; however, she is still having breakthroughs on amiodarone.  We discussed options during this visit including continuing amiodarone or pursuing AV node ablation.  Initially I  was concerned about pursing AV node ablation as she has moderate to severe mitral regurgitation; however, she failed therapy with amiodarone therefore I think biventricular pacemaker and AV node ablation is a reasonable approach.    I explained the procedure in detail.  At this time, she is unsure whether or not she would like to pursue it.  I gave her a booklet.  She will discuss plans with her daughter and get back to us.  We will give her a call in a couple of days to follow up regarding plan.   2.  Embolic prevention.  CHADS-VASc score of 5.  Continue anticoagulation with Eliquis.    3.  Device care.  Continue device checks q.3 months.   4.  Valve disease (moderate to severe TR, MR and AS).  Continue current therapy.     Again, thank you for allowing me to participate in the care of your patient.      Sincerely,    Praful Monae MD     [facility]

## 2017-03-20 NOTE — MR AVS SNAPSHOT
After Visit Summary   3/20/2017    Rebecca Helms    MRN: [id number]           Patient Information     Date Of Birth          6/18/1928        Visit Information        Provider Department      3/20/2017 1:15 PM Praful Monae MD [facility]        Today's Diagnoses     Persistent atrial fibrillation (H)           Follow-ups after your visit        Your next 10 appointments already scheduled     Apr 07, 2017  2:10 PM CDT   LAB with [facility]   [facility] ([facility])    [address] [place] [address] [address]  [facility] [address] [phone]           Patient must bring picture ID.  Patient should be prepared to give a urine specimen  Please do not eat 10-12 hours before your appointment if you are coming in fasting for labs on lipids, cholesterol, or glucose (sugar).  Pregnant women should follow their Care Team instructions. Water with medications is okay. Do not drink coffee or other fluids.   If you have concerns about taking  your medications, please ask at office or if scheduling via "[employer] send a message by clicking on Secure Messaging, Message Your Care Team.            Apr 07, 2017  3:10 PM CDT   Core Return with Maday Diaz PA-C   [facility] ([facility])    [address]  [facility] [address] [phone]            Felix 15, 2017  4:45 PM CDT   Remote PPM Check with [facility] TECH1   [facility] ([facility])    [address] [place] [address] [address]  [facility] [address] [phone]           This appointment is for a remote check of your pacemaker.  This is not an appointment at the office.              Who to contact     If you have questions or need follow up information about today's clinic visit or your schedule please contact [facility]  "directly at [phone].  Normal or non-critical lab and imaging results will be communicated to you by MyChart, letter or phone within 4 business days after the clinic has received the results. If you do not hear from us within 7 days, please contact the clinic through [employer] or phone. If you have a critical or abnormal lab result, we will notify you by phone as soon as possible.  Submit refill requests through [employer] or call your pharmacy and they will forward the refill request to us. Please allow 3 business days for your refill to be completed.          Additional Information About Your Visit        [employer] Information     [employer] gives you secure access to your electronic health record. If you see a primary care provider, you can also send messages to your care team and make appointments. If you have questions, please call your primary care clinic.  If you do not have a primary care provider, please call [phone] and they will assist you.        Care EveryWhere ID     This is your Care EveryWhere ID. This could be used by other organizations to access your [facility] medical records  [id number]        Your Vitals Were     Pulse Height BMI (Body Mass Index)             66 1.549 m (5' 1\") 22.56 kg/m2          Blood Pressure from Last 3 Encounters:   03/20/17 142/78   03/09/17 112/70   02/15/17 137/89    Weight from Last 3 Encounters:   03/20/17 54.2 kg (119 lb 6.4 oz)   03/09/17 54 kg (119 lb 1.6 oz)   02/15/17 56.2 kg (124 lb)              We Performed the Following     Follow-Up with Electrophysiologist        Primary Care Provider Office Phone # Fax #    Ren Gipson [phone]       [facility] [id number]  [phone]  [facility] [address]        Thank you!     Thank you for choosing [facility]  for your care. Our goal is always to provide you with excellent care. Hearing back from our patients is one way we can continue to " improve our services. Please take a few minutes to complete the written survey that you may receive in the mail after your visit with us. Thank you!             Your Updated Medication List - Protect others around you: Learn how to safely use, store and throw away your medicines at www.disposemymeds.org.          This list is accurate as of: 3/20/17  1:52 PM.  Always use your most recent med list.                   Brand Name Dispense Instructions for use    amiodarone 200 MG tablet    PACERONE/CODARONE     Take 1 tablet (200 mg) by mouth daily       apixaban ANTICOAGULANT 2.5 MG tablet    ELIQUIS    90 tablet    Take 2.5 mg by mouth 2 times daily       brimonidine 0.15 % ophthalmic solution    ALPHAGAN-P     Place 1 drop into both eyes 2 times daily       calcium-vitamin D 600-400 MG-UNIT per tablet    CALTRATE     Take 1 tablet by mouth daily       furosemide 40 MG tablet    LASIX    30 tablet    Take 1 tablet (40 mg) by mouth daily       latanoprost 0.005 % ophthalmic solution    XALATAN     Place 1 drop into both eyes At Bedtime       lisinopril 10 MG tablet    PRINIVIL/ZESTRIL     Take 1 tablet (10 mg) by mouth daily at night       metoprolol 25 MG 24 hr tablet    TOPROL-XL    180 tablet    Take 3 tablets (75 mg) by mouth daily       moxifloxacin 0.5 % ophthalmic solution    VIGAMOX     Place 1 drop Into the left eye daily as needed (eye infections)       Multi-vitamin Tabs tablet      Take 1 tablet by mouth daily.       order for DME     1 each    Equipment being ordered: Walker Wheels () and Walker () Treatment Diagnosis: difficulty with gait       pravastatin 20 MG tablet    PRAVACHOL    30 tablet    Take 1 tablet (20 mg) by mouth daily       timolol 0.5 % ophthalmic solution    TIMOPTIC     Place 1 drop into both eyes 2 times daily       TRAMADOL HCL PO      Take 50 mg by mouth At Bedtime Patient takes scheduled       TYLENOL ARTHRITIS PAIN 650 MG CR tablet   Generic drug:  acetaminophen      Take  650 mg by mouth daily

## 2017-03-21 NOTE — PROGRESS NOTES
REASON FOR VISIT:  Evaluation of AFib.      HISTORY OF PRESENT ILLNESS:  Ms. Helms is a delightful 88-year-old lady with a history of hypertension, hyperlipidemia, paroxysmal atrial fibrillation, sick sinus syndrome (pacemaker implantation 3 years ago), and heart failure secondary to moderately severe aortic stenosis who is here for evaluation of paroxysmal atrial fibrillation.        The patient has had several admissions in the hospital for heart failure exacerbation associated with AFib with RVR.  She was admitted on 12/08 and was seen on 01/23.  She was evaluated by my colleague Dr. White and was still complaining of having episodes of palpitations associated with shortness of breath.  She was referred to me for a second opinion for management of atrial fibrillation.        I saw her on 02/15, and at that time I decided to start her on amiodarone therapy to prevent recurrence of AFib.       She informs that despite amiodarone, she is still having episodes of paroxysmal atrial fibrillation with palpitations associated with shortness of breath.  Last week she actually had 1 episode that woke her up during the morning.      She denies any other symptoms such as chest pain or syncope; however, she does affirm becoming extremely lightheaded when she has episodes of palpitations.      Nuclear stress test obtained in 12/16 ruled out ischemia.  Echocardiogram obtained 02/17 showed EF of 55%-60% with moderate to severe MR, moderate to severe TR and moderate to severe aortic stenosis.       ASSESSMENT AND PLAN:    1.  Symptomatic paroxysmal atrial fibrillation associated with heart failure symptoms.  We attempted amiodarone for rhythm control strategy; however, she is still having breakthroughs on amiodarone.  We discussed options during this visit including continuing amiodarone or pursuing AV node ablation.  Initially I was concerned about pursing AV node ablation as she has moderate to severe mitral regurgitation;  however, she failed therapy with amiodarone therefore I think biventricular pacemaker and AV node ablation is a reasonable approach.    I explained the procedure in detail.  At this time, she is unsure whether or not she would like to pursue it.  I gave her a booklet.  She will discuss plans with her daughter and get back to us.  We will give her a call in a couple of days to follow up regarding plan.   2.  Embolic prevention.  CHADS-VASc score of 5.  Continue anticoagulation with Eliquis.    3.  Device care.  Continue device checks q.3 months.   4.  Valve disease (moderate to severe TR, MR and AS).  Continue current therapy.         JULI MERIDA MD             D: 2017 13:50   T: 2017 19:47   MT: CARLO      Name:     ANTHONY KEMP   MRN:      [id number]        Account:      [id number]   :      1928           Service Date: 2017      Document: [id number]

## 2017-03-21 NOTE — PROGRESS NOTES
No call into telemanagement x5 days. Wt in clinic yesterday 119#. Last CORE visit 2/15, clinic wt 124# and was changed from lasix to torsemide that day. Called pt, line busy. Will try again later.

## 2017-03-23 NOTE — PROGRESS NOTES
TELEMANAGEMENT: Wt 116#, up 1# from yesterday, within wt parameters with SOB, dizziness reported. Pt takes Lasix 40mg daily. Pt saw Dr. Monae on 3/20, at which time they discussed possible AV node ablation, but pt wanted to think about it. Called pt, she states she is feeling well now without any problems. She had an episode this am where she was SOB and felt funny for about 30 minutes. Same feeling as she has had in the past. Pt denies dizziness, SOB, swelling now. Pt states she is still thinking about the ablation and not sure. Next OV 4/7 with Farheen. Will continue to monitor. ZULLY Stafford

## 2017-03-24 NOTE — TELEPHONE ENCOUNTER
Message    Personal reminder Received: 4 days ago       rPaful Monae MD  [facility] Heart Ep Nurse                     Please f/u with pt in 2 days to discuss AV node ablation vs Amio therapy.     Praful       Writer attempted to call pt to inquire about decision as above, but no answer and no VM account set up. Our office will need to try calling pt again. JORGE LUIS Jones RN.

## 2017-03-27 NOTE — PROGRESS NOTES
TELEMANAGEMENT:  No call variance since 3/23/17.  Attempted to call pt to remind her to call-in, no answer and no option to LVM.  Will continue to monitor TELEMANAGEMENT for wt/sx.  ZULLY Ruggiero 12:13 PM 3/27/2017

## 2017-03-27 NOTE — PROGRESS NOTES
Called and spoke with pt who reports wt is 115# today and no sx today. Manually entered survey into TELEMANAGEMENT for pt.  ZULLY Ruggiero 4:34 PM 3/27/2017

## 2017-03-28 NOTE — TELEPHONE ENCOUNTER
Called pt and she has decided to have the AV node ablation that is recommended by Dr Monae. She is having blurred vision and it is new since the amiodarone. She will stop the amiodarone.

## 2017-03-28 NOTE — PROGRESS NOTES
Telemanagement: Wt down 4# to 111# and 3 # below parameter. No sx reported. Spoke w/ pt, she denies dizziness or acute sx/illness. She says she did not take her lasix today d/t wt going down, says she's been urinating frequently and LE edema significantly improved. I told her we'd review her sx/wt tomorrow and decide how to proceed w/ diuretic.     Per previous RN note-she also stopped her amio today d/t vision sx. I reviewed these w/ her she says her visual acuity has decreased since starting medication. She denies blurriness, profound weakness, difficulty speaking/swallowing. I told her those sx could be signs of stroke and if she has those she should call 911. She believes decrease in visual acuity d/t amio.

## 2017-03-28 NOTE — PROGRESS NOTES
Pt says she has phone device check scheduled for 4/20/17 and her device doesn't have the capability/equipment for remote check. Will route to device RN to arrange in clinic visit for device check.

## 2017-03-28 NOTE — PROGRESS NOTES
VM from Chana w/ [facility] re: concerns of pt's wt loss. See note below, already reviewed w/ pt below. LVM for Chana that pt and I had reviewed.

## 2017-03-29 NOTE — PROGRESS NOTES
TELEMANAGEMENT: Wt 112# today, up 1# from yesterday and now 2# below min wt parameters.  No symptoms reported on survey today.  Pt takes lasix 40mg daily; however held dose yesterday due to wt loss.  Called pt to review, no answer and unable to LVM.  Will attempt to call pt later today if time allows. Will continue to monitor pt's wt/sx with TELEMANAGEMENT.  ZULLY Ruggiero 10:53 AM 3/29/2017

## 2017-03-30 NOTE — TELEPHONE ENCOUNTER
Let's adjust her parameters to 112-120 lbs. If she goes below parameters, she should hold lasix for a day. Repeat BMP when she returns on 4/7/17. EE

## 2017-03-30 NOTE — TELEPHONE ENCOUNTER
Received VM from Mendy [facility] RN. She was out to see pt today, and she told her Amiodarone was stopped due to vision changes. Mendy asking for call back to verify this. Reviewed EPIC, pt spoke with AISHA SOLORIO RN 3/28, Amiodarone was stopped due to vision changes, and pt scheduled for AV node ablation on 4/7. Returned call to Mendy, no answer. LVM verifying Amiodarone was stopped due to vision changes and pt scheduled for ablation 4/7. Left call back number for questions. ZULLY Stafford

## 2017-03-30 NOTE — PROGRESS NOTES
TELEMANAGEMENT: Wt 112# today, same as yesterday and remains 2# below min wt parameters.  No symptoms reported on survey today.  Pt takes lasix 40mg daily.  Called and spoke with pt who reports that she held lasix dose on 3/28 for wt of 111#, and is wondering if she should continue taking lasix since wt is down.  Pt denies increased SOB, states swelling in legs is so much better, and she denies dizziness.  She reports feeling well and denies any illness/diarrhea that could explain recent wt loss.  Discussed with pt that will review with Dr. White if lasix dose should be adjusted and will call back with recommendations. Next scheduled OV is with Farheen and MILADIS on 4/7/17.  Routed to Dr. White for review.      ZULLY Ruggiero 10:13 AM 3/30/2017

## 2017-03-31 NOTE — PROGRESS NOTES
TELEMANAGEMENT: Wt 111#, down 1# from yesterday, 1# below min wt parameters with no symptoms reported. Pt takes Lasix 40mg daily. Per Dr. White yesterday, 3/30, wt parameters changed to 112-120, and if below parameters, showed hold Lasix x 1 day. Pt was supposed to have OV with SMore and lab on 4/7, however, this was rescheduled to 4/14, as pt having vision changes on Amiodarone. Dr. Monae stopped Amiodarone and scheduled pt for AV node ablation on 4/7. Called pt, she is feeling well without concerns. Pt already took her Lasix today. Reviewed with pt, if wt tomorrow is still less than 112#, she should hold her Lasix x 1 day. Pt agreed. Also told pt this should be the plan in the future as well, if her wt drops below 112#, she should hold Lasix x 1 day. Pt stated understanding. Will continue to monitor. ZULLY Stafford

## 2017-04-03 NOTE — PROGRESS NOTES
TELEMANAGEMENT: Wt 109# today, down 2# from yesterday and now 3# below min wt parameter.  Pt takes lasix 40mg daily.  Per Dr. White on 3/30, wt parameters changed to 112-120, and if wt below parameters, should hold Lasix x 1 day. Pt was supposed to have OV with SMore and lab on 4/7, however, this was rescheduled to 4/14, as pt having vision changes on Amiodarone. Dr. Monae stopped Amiodarone and scheduled pt for AV node ablation on 4/7. Per note below on 3/31, pt was advised to hold lasix if wt drops below 112#.  Called and spoke with pt. She confirmed that she has not taken lasix at all since 3/31, as wt has been below 112# since then.  Re-confirmed with pt the plan to hold lasix X 1 day if wt is below 112# and to take lasix 40mg daily if wt is 112# or more. Pt verbalized understanding and agrees with this plan.  ZULLY Ruggiero 12:49 PM 4/3/2017

## 2017-04-04 NOTE — PROGRESS NOTES
TELEMANAGEMENT: Wt 111# today, up 2# from yesterday and now 1# below min wt parameter.  Pt takes lasix 40mg daily; however, was advised per Dr. White's recommendations on 3/30 to hold lasix if wt is below 112#.  Per conversation with pt yesterday, she has been following this instruction.  Pt is scheduled for AV node ablation on 4/7 and f/u with SMore on 4/14.  Will route to [facility] to inquire if pt's wt parameters should be adjusted.  ZULLY Ruggiero 3:13 PM 4/4/2017

## 2017-04-05 NOTE — PROGRESS NOTES
pls adjust wt parameters to 109-116.  Diuretics per Dr. Christopher urbina.  Maday Diaz PA-C 4/5/2017 7:48 AM

## 2017-04-05 NOTE — PROGRESS NOTES
TELEMANAGEMENT wt parameters adjusted, per Farheen's recommendation. ZULLY Ruggiero 8:19 AM 4/5/2017

## 2017-04-07 NOTE — IP AVS SNAPSHOT
MRN:[id number]                      After Visit Summary   4/7/2017    Rebecca Helms    MRN: [id number]           Thank you!     Thank you for choosing [facility] for your care. Our goal is always to provide you with excellent care. Hearing back from our patients is one way we can continue to improve our services. Please take a few minutes to complete the written survey that you may receive in the mail after you visit with us. Thank you!        Patient Information     Date Of Birth          6/18/1928        About your hospital stay     You were admitted on:  April 7, 2017 [facility]    You were discharged on:  April 8, 2017        Reason for your hospital stay       AV node ablation                  Who to Call     For medical emergencies, please call 911.  For non-urgent questions about your medical care, please call your primary care provider or clinic, [phone]          Attending Provider     Provider Specialty    Praful Monae MD Cardiology       Primary Care Provider Office Phone # Fax #    Ren Gipson [phone]       [facility] [address]  [facility] [address]         When to contact your care team       Call your primary doctor if you have any of the following: temperature greater than 101.4 or less than 97 or  increased shortness of breath.                  After Care Instructions     Activity       Your activity upon discharge: activity as tolerated and no driving for today            Diet       Follow this diet upon discharge: Orders Placed This Encounter      Advance Diet as Tolerated: Regular Diet Adult            Discharge Instructions                 Follow-up Appointments     Follow-up and recommended labs and tests        Follow up with primary care provider, Ren Gipson, within 7 days for hospital follow- up.  No follow up labs or test are needed.                  Your  next 10 appointments already scheduled     Apr 14, 2017  2:10 PM CDT   LAB with [facility]   [facility]    [address]  [place]  [place] [address] [phone]           Patient must bring picture ID.  Patient should be prepared to give a urine specimen  Please do not eat 10-12 hours before your appointment if you are coming in fasting for labs on lipids, cholesterol, or glucose (sugar).  Pregnant women should follow their Care Team instructions. Water with medications is okay. Do not drink coffee or other fluids.   If you have concerns about taking  your medications, please ask at office or if scheduling via CoverMe, send a message by clicking on Secure Messaging, Message Your Care Team.            Apr 14, 2017  3:10 PM CDT   Core Return with Maday Diaz PA-C   [facility]    [address]  [place] [address] [phone]            Felix 15, 2017  4:45 PM CDT   Remote PPM Check with [facility] TECH1   [facility]    [address]  [place] [address] [phone]           This appointment is for a remote check of your pacemaker.  This is not an appointment at the office.              Additional Services     Follow-Up with Electrophysiologist                 Future tests that were ordered for you     Basic metabolic panel       Electrolytes and Creatinine in light of lisinopril change.            EKG 12-lead complete w/read  (to be scheduled)                 Further instructions from your care team       - Schedule follow up with Urology clinic:   [facility] ([facility])  [address]  [place] [address]  Main #: [phone]      - Stop amiodarone  - Continue Eliquis    - Follow up in Electrophysiology Clinic in 1 month:  "Phone: [phone]      Pending Results     Date and Time Order Name Status Description    4/7/2017 1059 EKG 12-lead, tracing only Preliminary             Statement of Approval     Ordered          04/08/17 0824  I have reviewed and agree with all the recommendations and orders detailed in this document.  EFFECTIVE NOW     Approved and electronically signed by:  Stef Canchola MD             Admission Information     Date & Time Provider Department Dept. Phone    4/7/2017 Praful Monae MD [facility] [phone]      Your Vitals Were     Blood Pressure Pulse Temperature Respirations Height Weight    149/63 (BP Location: Right arm) 96 97.9  F (36.6  C) (Oral) 16 1.549 m (5' 1\") 52.5 kg (115 lb 12.8 oz)    Pulse Oximetry BMI (Body Mass Index)                93% 21.88 kg/m2          MyChart Information     [employer] gives you secure access to your electronic health record. If you see a primary care provider, you can also send messages to your care team and make appointments. If you have questions, please call your primary care clinic.  If you do not have a primary care provider, please call [phone] and they will assist you.        Care EveryWhere ID     This is your Care EveryWhere ID. This could be used by other organizations to access your [facility] medical records  [id number]           Review of your medicines      CONTINUE these medicines which may have CHANGED, or have new prescriptions. If we are uncertain of the size of tablets/capsules you have at home, strength may be listed as something that might have changed.        Dose / Directions    lisinopril 10 MG tablet   Commonly known as:  PRINIVIL/ZESTRIL   This may have changed:  how much to take   Used for:  Essential hypertension        Dose:  20 mg   Take 2 tablets (20 mg) by mouth daily at night   Quantity:  30 tablet   Refills:  3         CONTINUE these medicines which have NOT CHANGED        Dose / Directions    " apixaban ANTICOAGULANT 2.5 MG tablet   Commonly known as:  ELIQUIS        Dose:  2.5 mg   Take 2.5 mg by mouth 2 times daily   Quantity:  90 tablet   Refills:  1       brimonidine 0.15 % ophthalmic solution   Commonly known as:  ALPHAGAN-P        Dose:  1 drop   Place 1 drop into both eyes 2 times daily   Refills:  0       calcium-vitamin D 600-400 MG-UNIT per tablet   Commonly known as:  CALTRATE        Dose:  1 tablet   Take 1 tablet by mouth daily   Refills:  0       furosemide 40 MG tablet   Commonly known as:  LASIX   Used for:  Acute diastolic congestive heart failure (H)        Dose:  40 mg   Take 1 tablet (40 mg) by mouth daily   Quantity:  30 tablet   Refills:  3       latanoprost 0.005 % ophthalmic solution   Commonly known as:  XALATAN        Dose:  1 drop   Place 1 drop into both eyes At Bedtime   Refills:  0       metoprolol 25 MG 24 hr tablet   Commonly known as:  TOPROL-XL   Used for:  Acute diastolic congestive heart failure (H), Persistent atrial fibrillation (H)        Dose:  75 mg   Take 3 tablets (75 mg) by mouth daily   Quantity:  180 tablet   Refills:  3       moxifloxacin 0.5 % ophthalmic solution   Commonly known as:  VIGAMOX        Dose:  1 drop   Place 1 drop Into the left eye daily as needed (eye infections)   Refills:  0       Multi-vitamin Tabs tablet        Dose:  1 tablet   Take 1 tablet by mouth daily.   Refills:  0       order for DME   Used for:  Acute on chronic diastolic congestive heart failure (H)        Equipment being ordered: Walker Wheels () and Walker () Treatment Diagnosis: difficulty with gait   Quantity:  1 each   Refills:  0       pravastatin 20 MG tablet   Commonly known as:  PRAVACHOL   Used for:  CAD (coronary artery disease)        Dose:  20 mg   Take 1 tablet (20 mg) by mouth daily   Quantity:  30 tablet   Refills:  11       timolol 0.5 % ophthalmic solution   Commonly known as:  TIMOPTIC        Dose:  1 drop   Place 1 drop into both eyes 2 times daily    Refills:  0       TRAMADOL HCL PO        Dose:  50 mg   Take 50 mg by mouth At Bedtime Patient takes scheduled   Refills:  0       TYLENOL ARTHRITIS PAIN 650 MG CR tablet   Generic drug:  acetaminophen        Dose:  650 mg   Take 650 mg by mouth daily   Refills:  0         STOP taking     amiodarone 200 MG tablet   Commonly known as:  PACERONE/CODARONE                    Protect others around you: Learn how to safely use, store and throw away your medicines at www.disposemymeds.org.             Medication List: This is a list of all your medications and when to take them. Check marks below indicate your daily home schedule. Keep this list as a reference.      Medications           Morning Afternoon Evening Bedtime As Needed    apixaban ANTICOAGULANT 2.5 MG tablet   Commonly known as:  ELIQUIS   Take 2.5 mg by mouth 2 times daily   Last time this was given:  2.5 mg on 4/8/2017  9:43 AM                                brimonidine 0.15 % ophthalmic solution   Commonly known as:  ALPHAGAN-P   Place 1 drop into both eyes 2 times daily                                calcium-vitamin D 600-400 MG-UNIT per tablet   Commonly known as:  CALTRATE   Take 1 tablet by mouth daily   Last time this was given:  1 tablet on 4/8/2017  9:43 AM                                furosemide 40 MG tablet   Commonly known as:  LASIX   Take 1 tablet (40 mg) by mouth daily   Last time this was given:  40 mg on 4/8/2017  9:43 AM                                latanoprost 0.005 % ophthalmic solution   Commonly known as:  XALATAN   Place 1 drop into both eyes At Bedtime   Last time this was given:  1 drop on 4/7/2017 10:50 PM                                lisinopril 10 MG tablet   Commonly known as:  PRINIVIL/ZESTRIL   Take 2 tablets (20 mg) by mouth daily at night   Last time this was given:  10 mg on 4/8/2017  9:43 AM                                metoprolol 25 MG 24 hr tablet   Commonly known as:  TOPROL-XL   Take 3 tablets (75 mg) by mouth  daily   Last time this was given:  75 mg on 4/8/2017  9:44 AM                                moxifloxacin 0.5 % ophthalmic solution   Commonly known as:  VIGAMOX   Place 1 drop Into the left eye daily as needed (eye infections)                                Multi-vitamin Tabs tablet   Take 1 tablet by mouth daily.   Last time this was given:  1 tablet on 4/8/2017  9:43 AM                                order for DME   Equipment being ordered: Walker Wheels () and Walker () Treatment Diagnosis: difficulty with gait                                pravastatin 20 MG tablet   Commonly known as:  PRAVACHOL   Take 1 tablet (20 mg) by mouth daily   Last time this was given:  20 mg on 4/7/2017  9:35 PM                                timolol 0.5 % ophthalmic solution   Commonly known as:  TIMOPTIC   Place 1 drop into both eyes 2 times daily   Last time this was given:  1 drop on 4/8/2017  6:05 AM                                TRAMADOL HCL PO   Take 50 mg by mouth At Bedtime Patient takes scheduled   Last time this was given:  50 mg on 4/7/2017  9:35 PM                                TYLENOL ARTHRITIS PAIN 650 MG CR tablet   Take 650 mg by mouth daily   Generic drug:  acetaminophen

## 2017-04-07 NOTE — PROGRESS NOTES
Pt resting in bed post cardiac AV node ablation. Right groin dry and intact with no bleeding, VSS. Denies additional needs at this time, call light in reach. Bedrest of 4 hours explained to pt and family. Report given for shift change to Niyah PANTOJA

## 2017-04-07 NOTE — IP AVS SNAPSHOT
[facility]    [address] [place] [address]    Phone:  [phone]                                       After Visit Summary   4/7/2017    Rebecca Helms    MRN: [id number]           After Visit Summary Signature Page     I have received my discharge instructions, and my questions have been answered. I have discussed any challenges I see with this plan with the nurse or doctor.    ..........................................................................................................................................  Patient/Patient Representative Signature      ..........................................................................................................................................  Patient Representative Print Name and Relationship to Patient    ..................................................               ................................................  Date                                            Time    ..........................................................................................................................................  Reviewed by Signature/Title    ...................................................              ..............................................  Date                                                            Time

## 2017-04-07 NOTE — PROGRESS NOTES
Patient is status post AVN ablation.  Procedure details are below:    Indication: Afib with RVR  Findings: Successful AVN ablation  Estimated blood loss was minimal (< 20 cc)    No immediate complications are apparent.      Praful Monae MD

## 2017-04-07 NOTE — PROGRESS NOTES
"1455 Report received from ZULLY Carter.  1500 Bandaid CDI to right groin puncture sites. Slight oozing noted - no hematoma. Area soft & flat. Pt denies pain. Pt instructed on activity restrictions while on bedrest. Verbal understanding received from pt. Pt's family at bedside. Detailed update given.   1520 Unable to void at this time.  1600 Pt uncomfortable Bladder scan done - >999cc urine in bladder. Bladder distention visible. Pt uncomfortable to palpation.  1610 Unable to insert Blue catheter. No visible opening. Multiple attempts made w/ coude cath.  1625 Warm water poured over jr area - pt started to void spontaneously & then abruptly stopped. Pt stated \"ever since they started me on those diueretics - I don't pee normally - I just trickle\". Unknown if urinary retention preexisting. Pt only had <300 cc IV flds since admisssion. Enc pt to void on pad if able.  1640 Dr Monae called for Urology consult.  1645 Call placed to Urology Clinic - info given to answering machine.  1650 Return call received from Dr Rashid. Detailed update given.   1710 Pt taking diet & flds fair.  1730 Charge RN from station 33 here to attempt inserting blue catheter. Pt has been incontinent of mod amt urine. Bladder distention less, but pt is still uncomfortable. 30 min remaining on bedrest time. Attempted blue insertion - unsuccessfully. No visible opening.   1740 Pt enc to void on pad if able. Pt & family enc to speak with primary dr about urinary issues & inability to be cathed. Enc to see a urologist in near future. Verbal understanding received.  1800 OOB - to bedside commode. Voided 275 cc urine. Ambulated in halls with walker & NA with good sawyer. No change in puncture site assessment with activity.  1805 Bladder scan done post void. 309 cc urine remaining in bladder.  1810 Call received from Dr Rios. Detailed update given.   1830 Pt resting quietly at this time. No complaints.  1915 Pt OOB to bedside commode. Large BM at this " time. Voided small amt urine.  1950 Pt transferred to OBS rm 2 per w/c. All personal belongings - including w/c -  sent with pt.   2005 Detailed verbal report called to ZULLY Dukes in OBS unit.

## 2017-04-08 NOTE — DISCHARGE INSTRUCTIONS
- Schedule follow up with Urology clinic:   [facility] ([facility])  [address], [address]  [place] [address]  Main #: [phone]      - Stop amiodarone  - Continue Eliquis    - Follow up in Electrophysiology Clinic in 1 month: Phone: [phone]

## 2017-04-08 NOTE — PLAN OF CARE
Problem: Goal Outcome Summary  Goal: Goal Outcome Summary  Outcome: Adequate for Discharge Date Met:  04/08/17  Pt A&O, VSS. Denies pain.  Tele V paced. R groin with dried drainage, unchanged.  Voiding. Tolerating reg diet. SBA w/ walker. Chronic BLE, wraps placed on LE. IV SL. Ok for d/c home per MD.  D/c orders, instructions, and medications reviewed with patient and daughter.  All questions answered.  Escorted to exit by aid via wheelchair.

## 2017-04-08 NOTE — DISCHARGE SUMMARY
"EP Cardiology Discharge Note  Damaris Canchola MD         Assessment and Plan:      1.  Paroxysmal AF:  Successful AV node ablation on 04/07 by Dr. Monae.  Complete AV block induced.  Pt currently AV paced at 80 ppm.  R groin looks OK.  Issue with urination last pm, appears to be improving.    Plan:  - she will symone to see Urology as outpatient  - stop amiodarone  - continue Eliquis  - f/up in EP Clinic in 1 month                   Interval History:   doing well; no cp, sob, n/v/d, or abd pain.  Urination has improved.          Review of Systems:   C: NEGATIVE for fever, chills, change in weight  E/M: NEGATIVE for ear, mouth and throat problems  R: NEGATIVE for significant cough or SOB  CV: NEGATIVE for chest pain, palpitations or peripheral edema          Physical Exam:        Blood pressure 149/63, pulse 96, temperature 97.9  F (36.6  C), temperature source Oral, resp. rate 16, height 1.549 m (5' 1\"), weight 52.5 kg (115 lb 12.8 oz), SpO2 93 %.  Vitals:    04/07/17 1103 04/08/17 0500   Weight: 52.6 kg (116 lb) 52.5 kg (115 lb 12.8 oz)     Vital Signs with Ranges  Temp:  [96.4  F (35.8  C)-97.9  F (36.6  C)] 97.9  F (36.6  C)  Pulse:  [60-96] 96  Resp:  [16] 16  BP: (123-228)/() 149/63  SpO2:  [93 %-99 %] 93 %  I/O's Last 24 hours  I/O last 3 completed shifts:  In: 50 [P.O.:50]  Out: 600 [Urine:600]    EXAM:  Alert  Lungs: clear  CV: RRR  EXTR: no hematoma         Medications:          acetaminophen  650 mg Oral Daily     multivitamin, therapeutic with minerals  1 tablet Oral Daily     calcium-vitamin D  1 tablet Oral Daily     latanoprost  1 drop Both Eyes At Bedtime     timolol  1 drop Both Eyes BID     traMADol (ULTRAM) tablet 50 mg  50 mg Oral At Bedtime     pravastatin  20 mg Oral Daily     brimonidine  1 drop Both Eyes BID     apixaban ANTICOAGULANT  2.5 mg Oral BID     lisinopril  10 mg Oral Daily     metoprolol  75 mg Oral Daily     furosemide  40 mg Oral Daily     amiodarone  200 mg Oral Daily     sodium " chloride (PF)  3 mL Intracatheter Q8H            Data:      All new lab and imaging data was reviewed.   Recent Labs   Lab Test  04/07/17   1110  01/20/17   2221  12/23/16   0122   12/08/16   1830   08/06/11   0700   WBC  8.0  9.3  12.4*   < >  15.9*   < >   --    HGB  13.1  13.7  13.4   < >  16.5*   < >   --    MCV  98  99  98   < >  98   < >   --    PLT  184  178  197   < >  259   < >   --    INR  1.38*   --    --    --   1.17*   --   1.08    < > = values in this interval not displayed.      Recent Labs   Lab Test  04/07/17   1110  03/09/17   1106  02/15/17   1435   NA  139  137  135   POTASSIUM  4.3  3.7  3.9   CHLORIDE  107  100  99   CO2  24  29  24   BUN  24  25  42*   CR  1.03  1.36*  1.63*   ANIONGAP  8  11.7  12   CARLO  9.1  10.0  9.1   GLC  86  98  87     Recent Labs   Lab Test  12/09/16   0551  12/09/16   0055  12/08/16   1830   TROPI  0.035  0.057*  <0.015  The 99th percentile for upper reference range is 0.045 ug/L.  Troponin values in   the range of 0.045 - 0.120 ug/L may be associated with risks of adverse   clinical events.           EKG results:  Reviewed      Imaging:   No results found for this or any previous visit (from the past 24 hour(s)).

## 2017-04-08 NOTE — PLAN OF CARE
Problem: Goal Outcome Summary  Goal: Goal Outcome Summary  Outcome: Improving  Tele V-paced. Right groin site flat, dry and intact with minimal old dried drainage on dressing. Pulses 2+ and CMS intact. Baseline edema lower legs and feet. Ambulated to bathroom with standby assist and walker, voided 200 cc, post void residual 0 cc. Rates headache 5/10.

## 2017-04-08 NOTE — PLAN OF CARE
Problem: Goal Outcome Summary  Goal: Goal Outcome Summary  VSS. RA at 92% SPO2. Tele V-paced. Right groin site flat, dry and intact with minimal old dried drainage on dressing. Pulses 2+ and CMS intact. Baseline +1 edema in ETHEL lower legs and feet. Ambulated to bathroom with standby assist and walker, voided 200ml, PVR showed 38ml. No pain noted overnight.

## 2017-04-12 NOTE — PROGRESS NOTES
PPM rep check - 4/7/2017  Pt in hospital for AVNA.     Settings changed from DDDR  to DDDR  post AVNA  Since last remote 3/20/2017, several episodes for ATR.   Lead measurements stable.   A pacing 76%, V pacing 11%  Battery longevity estimated at 7 yrs remaining.     Pt has OV with Maday MILES on 4/14/2017 at 3:10, lab at 2:10. Scheduled courtesy device check at 2:30 that day to turn LRL down to 70 per protocol. Left message for pt about this appointment.

## 2017-04-14 NOTE — MR AVS SNAPSHOT
After Visit Summary   4/14/2017    Rebecca Helms    MRN: [id number]           Patient Information     Date Of Birth          6/18/1928        Visit Information        Provider Department      4/14/2017 2:45 PM MADRID DCR2 [facility]        Today's Diagnoses     Cardiac pacemaker in situ    -  1       Follow-ups after your visit        Your next 10 appointments already scheduled     Apr 14, 2017  3:10 PM CDT   Core Return with Maday Diaz PA-C   [facility] ([facility])    [address] [place] [address] [address]  [facility] [address] [phone]            Jul 17, 2017  2:45 PM CDT   Remote PPM Check with MADRID TECH1   [facility] ([facility])    [address]  [facility] [address] [phone]           This appointment is for a remote check of your pacemaker.  This is not an appointment at the office.              Who to contact     If you have questions or need follow up information about today's clinic visit or your schedule please contact [facility] directly at [phone].  Normal or non-critical lab and imaging results will be communicated to you by [employer], letter or phone within 4 business days after the clinic has received the results. If you do not hear from us within 7 days, please contact the clinic through [employer] or phone. If you have a critical or abnormal lab result, we will notify you by phone as soon as possible.  Submit refill requests through [employer] or call your pharmacy and they will forward the refill request to us. Please allow 3 business days for your refill to be completed.          Additional Information About Your Visit        MyChart Information     [employer] gives you secure access to your electronic health record. If you see a primary care provider, you can also send messages  to your care team and make appointments. If you have questions, please call your primary care clinic.  If you do not have a primary care provider, please call [phone] and they will assist you.        Care EveryWhere ID     This is your Care EveryWhere ID. This could be used by other organizations to access your [facility] medical records  [id number]         Blood Pressure from Last 3 Encounters:   04/08/17 149/63   03/20/17 142/78   03/09/17 112/70    Weight from Last 3 Encounters:   04/08/17 52.5 kg (115 lb 12.8 oz)   03/20/17 54.2 kg (119 lb 6.4 oz)   03/09/17 54 kg (119 lb 1.6 oz)              We Performed the Following     PM DEVICE PROGRAMMING EVAL, DUAL LEAD PACER ([id number])        Primary Care Provider Office Phone # Fax #    Ren Gipson [phone]       [facility] [address]  [facility] [address]        Thank you!     Thank you for choosing [facility] [place]  for your care. Our goal is always to provide you with excellent care. Hearing back from our patients is one way we can continue to improve our services. Please take a few minutes to complete the written survey that you may receive in the mail after your visit with us. Thank you!             Your Updated Medication List - Protect others around you: Learn how to safely use, store and throw away your medicines at www.disposemymeds.org.          This list is accurate as of: 4/14/17  2:56 PM.  Always use your most recent med list.                   Brand Name Dispense Instructions for use    apixaban ANTICOAGULANT 2.5 MG tablet    ELIQUIS    90 tablet    Take 2.5 mg by mouth 2 times daily       brimonidine 0.15 % ophthalmic solution    ALPHAGAN-P     Place 1 drop into both eyes 2 times daily       calcium-vitamin D 600-400 MG-UNIT per tablet    CALTRATE     Take 1 tablet by mouth daily       furosemide 40 MG tablet    LASIX    30 tablet    Take 1 tablet (40 mg) by mouth daily        latanoprost 0.005 % ophthalmic solution    XALATAN     Place 1 drop into both eyes At Bedtime       lisinopril 10 MG tablet    PRINIVIL/ZESTRIL    30 tablet    Take 2 tablets (20 mg) by mouth daily at night       metoprolol 25 MG 24 hr tablet    TOPROL-XL    180 tablet    Take 3 tablets (75 mg) by mouth daily       moxifloxacin 0.5 % ophthalmic solution    VIGAMOX     Place 1 drop Into the left eye daily as needed (eye infections)       Multi-vitamin Tabs tablet      Take 1 tablet by mouth daily.       order for DME     1 each    Equipment being ordered: Walker Wheels () and Walker () Treatment Diagnosis: difficulty with gait       pravastatin 20 MG tablet    PRAVACHOL    30 tablet    Take 1 tablet (20 mg) by mouth daily       timolol 0.5 % ophthalmic solution    TIMOPTIC     Place 1 drop into both eyes 2 times daily       TRAMADOL HCL PO      Take 50 mg by mouth At Bedtime Patient takes scheduled       TYLENOL ARTHRITIS PAIN 650 MG CR tablet   Generic drug:  acetaminophen      Take 650 mg by mouth daily

## 2017-04-14 NOTE — PROGRESS NOTES
[id number]  HPI and Plan:   See dictation    Orders this Visit:  Orders Placed This Encounter   Procedures     Basic metabolic panel     Follow-Up with [facility] - NATACHA visit     No orders of the defined types were placed in this encounter.    There are no discontinued medications.      Encounter Diagnosis   Name Primary?     Chronic diastolic CHF (congestive heart failure) (H) Yes       CURRENT MEDICATIONS:  Current Outpatient Prescriptions   Medication Sig Dispense Refill     lisinopril (PRINIVIL/ZESTRIL) 10 MG tablet Take 2 tablets (20 mg) by mouth daily at night 30 tablet 3     furosemide (LASIX) 40 MG tablet Take 1 tablet (40 mg) by mouth daily (Patient taking differently: Take 40 mg by mouth daily Holds for wt <112lbs) 30 tablet 3     metoprolol (TOPROL-XL) 25 MG 24 hr tablet Take 3 tablets (75 mg) by mouth daily 180 tablet 3     acetaminophen (TYLENOL ARTHRITIS PAIN) 650 MG CR tablet Take 650 mg by mouth daily       order for DME Equipment being ordered: Walker Wheels () and Walker ()  Treatment Diagnosis: difficulty with gait 1 each 0     moxifloxacin (VIGAMOX) 0.5 % ophthalmic solution Place 1 drop Into the left eye daily as needed (eye infections)        apixaban ANTICOAGULANT (ELIQUIS) 2.5 MG tablet Take 2.5 mg by mouth 2 times daily  90 tablet 1     brimonidine (ALPHAGAN-P) 0.15 % ophthalmic solution Place 1 drop into both eyes 2 times daily        pravastatin (PRAVACHOL) 20 MG tablet Take 1 tablet (20 mg) by mouth daily 30 tablet 11     calcium-vitamin D (CALTRATE) 600-400 MG-UNIT per tablet Take 1 tablet by mouth daily        latanoprost (XALATAN) 0.005 % ophthalmic solution Place 1 drop into both eyes At Bedtime       timolol (TIMOPTIC) 0.5 % ophthalmic solution Place 1 drop into both eyes 2 times daily       TRAMADOL HCL PO Take 50 mg by mouth At Bedtime Patient takes scheduled       multivitamin, therapeutic with minerals (MULTI-VITAMIN) TABS Take 1 tablet by mouth daily.         ALLERGIES      Allergies   Allergen Reactions     Amiodarone Visual Disturbance     Decreased visual acuity         PAST MEDICAL HISTORY:  Past Medical History:   Diagnosis Date     Aortic stenosis     moderate to severe     Back pain      CAD (coronary artery disease)     nonobstructive     Carotid stenosis     left side     Chronic diastolic CHF (congestive heart failure) (H)      Dyslipidemia      Glaucoma      HTN (hypertension)      Osteoporosis      Paroxysmal atrial fibrillation (H) 9/12/14    PM detected, <1 minute each time     Pulmonary hypertension (H)     moderate to severe     Sinus node dysfunction (H)     sp DDD PM     Syncope        PAST SURGICAL HISTORY:  Past Surgical History:   Procedure Laterality Date     DILATION AND CURETTAGE, HYSTEROSCOPY DIAGNOSTIC, COMBINED  1/16/2013    Procedure: COMBINED DILATION AND CURETTAGE, HYSTEROSCOPY DIAGNOSTIC;   DILATION AND CURETTAGE, HYSTEROSCOPY;  Surgeon: Ozzie Lo MD;  Location: [facility]     ENT SURGERY      TONSILLECTOMY/BILAT EAR SURGERY     EYE SURGERY      BILAT CATARACT-IOL     GYN SURGERY      OVARIAN CYSTECTOMY     IMPLANT PACEMAKER      dual chamber       FAMILY HISTORY:  Family History   Problem Relation Age of Onset     Unknown/Adopted Mother      Myocardial Infarction Father      Heart Surgery Father        SOCIAL HISTORY:  Social History     Social History     Marital status:      Spouse name: N/A     Number of children: N/A     Years of education: N/A     Social History Main Topics     Smoking status: Never Smoker     Smokeless tobacco: None     Alcohol use Yes      Comment: OCC     Drug use: No     Sexual activity: Not Asked     Other Topics Concern     Caffeine Concern No     2-3 cups decaf coffee per day     Sleep Concern No     Weight Concern Yes     weight loss     Special Diet No     Exercise Yes     exercises at home     Social History Narrative       Review of Systems:  Skin:  Negative     Eyes:  Positive for visual blurring  ENT:   "Positive for postnasal drainage  Respiratory:  Negative for shortness of breath;dyspnea on exertion;cough  Cardiovascular:  Negative for palpitations;chest pain;Positive for;edema  Gastroenterology: Negative    Genitourinary:  Negative    Musculoskeletal:  Positive for back pain  Neurologic:  Negative    Psychiatric:  Negative    Heme/Lymph/Imm:  Negative    Endocrine:  Negative      Physical Exam:  Vitals: /78  Pulse 76  Resp 16  Ht 1.6 m (5' 3\")  Wt 51.4 kg (113 lb 4.8 oz)  BMI 20.07 kg/m2   Please refer to dictation for physical exam    Recent Lab Results:  LIPID RESULTS:  No results found for: CHOL, HDL, LDL, TRIG, CHOLHDLRATIO    LIVER ENZYME RESULTS:  Lab Results   Component Value Date    AST 46 (H) 02/15/2017    ALT 39 02/15/2017       CBC RESULTS:  Lab Results   Component Value Date    WBC 8.0 04/07/2017    RBC 4.10 04/07/2017    HGB 13.1 04/07/2017    HCT 40.1 04/07/2017    MCV 98 04/07/2017    MCH 32.0 04/07/2017    MCHC 32.7 04/07/2017    RDW 17.0 (H) 04/07/2017     04/07/2017       BMP RESULTS:  Lab Results   Component Value Date     04/14/2017    POTASSIUM 4.6 04/14/2017    CHLORIDE 103 04/14/2017    CO2 29 04/14/2017    ANIONGAP 9.6 04/14/2017    GLC 89 04/14/2017    BUN 21 04/14/2017    CR 1.19 04/14/2017    GFRESTIMATED 43 (L) 04/14/2017    GFRESTBLACK 52 (L) 04/14/2017    CARLO 10.1 04/14/2017        A1C RESULTS:  No results found for: A1C    INR RESULTS:  Lab Results   Component Value Date    INR 1.38 (H) 04/07/2017    INR 1.17 (H) 12/08/2016           CC  Ren Gipson MD  [facility]  [address] [address]      "

## 2017-04-14 NOTE — PATIENT INSTRUCTIONS
Call CORE Nurse for any questions or concerns:   Shyanne Vargas, or Radha: [phone]   If you have concerns after hours, please call [phone], option 2    1. Medication changes: continue current medications.    Do not take your Lasix/ furosemide if you weigh less than 112 pounds.      2. Weigh yourself daily and write it down.     3. Call CORE nurse if your weight is up more than 2 pounds in one day, or 5 pounds in one week.    4. Call CORE nurse if you feel more short of breath, have more abdominal bloating or leg swelling.    5. Continue low sodium diet (less than 2000mg daily). If you eat less salt, you will retain less fluid.     6. Lab results:   Component      Latest Ref Rng & Units 4/14/2017   Sodium      136 - 145 mmol/L 137   Potassium      3.5 - 5.1 mmol/L 4.6   Chloride      98 - 107 mmol/L 103   Carbon Dioxide      23 - 29 mmol/L 29   Anion Gap      6 - 17 mmol/L 9.6   Glucose      70 - 105 mg/dL 89   Urea Nitrogen      7 - 30 mg/dL 21   Creatinine      0.70 - 1.30 mg/dL 1.19   GFR Estimate      >60 mL/min/1.7m2 43 (L)   GFR Estimate If Black      >60 mL/min/1.7m2 52 (L)   Calcium      8.5 - 10.5 mg/dL 10.1     **Do NOT take Aleve or Ibuprofen without checking with your doctor first        [facility]: Cardiomyopathy, Optimization, Rehabilitation, Education   The [facility] is a heart failure specialty clinic within the [facility] where you will work with specialized nurse practioners, physician assistants, doctors and registered nurses. They are dedicated to helping patients with heart failure to carefully adjust medications, receive education, and learn who and when to call if symptoms develop. They specialize in helping you better understand your condition, slow the progression of your disease, improve the length and quality of your life, help you detect future heart problems before they become life threatening, and avoid hospitalizations.

## 2017-04-14 NOTE — LETTER
4/14/2017    Ren Gipson MD  [facility]  [address] [place] [address]    RE: Rebecca Helms       Dear Colleague,    I had the pleasure of seeing Rebecca Helms in the [facility].    PRIMARY CARDIOLOGIST:  Dr. White, Dr. Monae and Dr. Warren, also followed by Elisabeth Chakraborty and myself.      REASON FOR VISIT:  [facility] Clinic followup.      Ms. Hlems is a delightful 88-year-old woman with a complex past cardiac history as follows:   1.  Moderate to severe aortic stenosis, moderate to severe mitral regurgitation and moderate to severe tricuspid regurgitation.   2.  Paroxysmal atrial fibrillation with intermittent tachycardia on Eliquis for anticoagulation, now status post AV node ablation.   3.  Sinus node dysfunction with permanent pacemaker in place.   4.  Hypertension.   5.  Dyslipidemia.   6.  Carotid stenosis.   7.  Heart failure with preserved EF.      I had met her earlier this winter after she was hospitalized for multiple episodes, some more profound fatigue and overwhelming doom.  There was a thought that her valve was severe enough and she was considered for TAVR, but on further evaluation, it was not as severe as thought.  There was some confusion about medications and the hospitalization around that, when it was then recognized that her AFib was causing both pulmonary edema and a feeling of doom.  She was initially started on amiodarone and see if this would control her rhythm, but she had visual side effects and it did not eliminate her tachy spells and most recently she had AV node ablation by Dr. Monae 1 week ago on 04/07.  This was an uncomplicated procedure, and she was discharged home.  We have been following her closely with tele-assurance.      She tells me today she overall feels well.  She still has a little bit of edema in her legs, but nothing compared to what it was.  Her weights have been stable on tele-assurance between about 110 and 113.   We currently have her holding her Lasix for weight less than 112.  She has not had any episodes of feeling of doom or tachycardia.  She has not been orthopneic.  She does not have PND.  She does wrap her legs at home but not when she goes out so that she can wear shoes she likes.  She is seeing a doctor about her bladder later this month.  She overall at this point feels well.  In the past, though she has gone up to a week without spells, so we are hopeful that this continues.      SOCIAL HISTORY:  She has 2 daughters, 1 in [place] and believe the other in [place].  She has a niece dc who is a HUC at [employer] who tries to help her as well.  She lives in the [address] [place] [address] which is a coop.      PHYSICAL EXAMINATION:   GENERAL:  Well-developed, well-nourished elderly woman in no acute distress, well dressed.   HEENT:  Normocephalic, atraumatic.   NECK:  Veins are flat at 90 degrees, she walks with a walker legs have 2+ edema to mid shin and none in the upper shin.  Varicosities are noted.   HEART:  Regular with both systolic and diastolic murmurs.  Her systolic murmur is about a 3/6, her diastolic is about 2/6 heard throughout the precordium.   LUNGS:  Diminished in bilateral bases, but no wheezes, rales or rhonchi.     Outpatient Encounter Prescriptions as of 4/14/2017   Medication Sig Dispense Refill     [DISCONTINUED] lisinopril (PRINIVIL/ZESTRIL) 10 MG tablet Take 10 mg by mouth daily  30 tablet 3     [DISCONTINUED] furosemide (LASIX) 40 MG tablet Take 1 tablet (40 mg) by mouth daily (Patient taking differently: Take 40 mg by mouth daily Holds for wt <112lbs) 30 tablet 3     [DISCONTINUED] metoprolol (TOPROL-XL) 25 MG 24 hr tablet Take 3 tablets (75 mg) by mouth daily 180 tablet 3     acetaminophen (TYLENOL ARTHRITIS PAIN) 650 MG CR tablet Take 1,300 mg by mouth 2 times daily        order for DME Equipment being ordered: Walker Wheels () and Walker ()  Treatment Diagnosis: difficulty with gait  1 each 0     moxifloxacin (VIGAMOX) 0.5 % ophthalmic solution Place 1 drop Into the left eye daily as needed (eye infections)        apixaban ANTICOAGULANT (ELIQUIS) 2.5 MG tablet Take 2.5 mg by mouth 2 times daily  90 tablet 1     brimonidine (ALPHAGAN-P) 0.15 % ophthalmic solution Place 1 drop into both eyes 2 times daily        pravastatin (PRAVACHOL) 20 MG tablet Take 1 tablet (20 mg) by mouth daily (Patient taking differently: Take 20 mg by mouth every evening ) 30 tablet 11     calcium-vitamin D (CALTRATE) 600-400 MG-UNIT per tablet Take 1 tablet by mouth daily        latanoprost (XALATAN) 0.005 % ophthalmic solution Place 1 drop into both eyes At Bedtime       timolol (TIMOPTIC) 0.5 % ophthalmic solution Place 1 drop into both eyes 2 times daily       TRAMADOL HCL PO Take 50 mg by mouth At Bedtime Patient takes scheduled       multivitamin, therapeutic with minerals (MULTI-VITAMIN) TABS Take 1 tablet by mouth daily.       No facility-administered encounter medications on file as of 4/14/2017.       ASSESSMENT AND PLAN:   1.  Paroxysmal atrial fibrillation with episodes of tachycardia previously causing pulmonary edema and feelings of doom.  She is now status post AV node ablation and has a permanent pacemaker in place.  This is a biventricular device.  This is a dual-chamber pacemaker.  At this point, she is 100% V paced appropriately on check today.  We are all hopeful that the elimination of her tachycardia will eliminate pulmonary edema and episodes of doom.   2.  Heart failure with preserved EF with weight stable around 109-114.  She is holding her Lasix for less than 112.  We will continue with that program as she is doing well without symptoms of heart failure.   3.  Significant valvular disease with both moderate to severe aortic stenosis, moderate to severe tricuspid regurgitation and moderate to severe mitral regurgitation.  She has been evaluated by the TAVR Clinic and her aortic valve was not felt  to be severe enough to intervene upon at this time we will follow this closely.   4.  Hypertension, well controlled.   5.  Dyslipidemia, followed by primary, excellent control.      I am going to see this patient back in 1 month in [facility] Clinic, will continue with tele-assurance, will get a BMP at that time.  We will see her sooner with concerns.  She will need routine pacemaker followup.      Again, thank you for allowing me to participate in the care of your patient.      Sincerely,    Maday Diaz PA-C     [facility]

## 2017-04-14 NOTE — PROGRESS NOTES
Reviewed during clinic visit.  Please see progress note for plan.  Maday Diaz PA-C 4/14/2017 5:13 PM

## 2017-04-14 NOTE — PROGRESS NOTES
[employer] Accolade 7 day Post AVNA Pacemaker Device Check  AP: 88 % : 100 %  Mode: DDDR 80        Underlying Rhythm: PAF with CHB achieved by AVNA without junctional escape rhythm at VVI 30-takes Eliquis  Heart Rate: Stable with adequate variability  Sensing: RA stable, dependent RV    Pacing Threshold: Stable    Impedance: Stable  Battery Status: 5.5 yrs  Atrial Arrhythmia: 252 episodes detected, comprising 9% of the total time-EGM's show PAF  Ventricular Arrhythmia: None  Setting Change: LRL decreased to 70 bpm as per AVNA protocol. Will leave LRL at 70 bpm for this pt-ambulates very slowly using wheeled walker.    Care Plan: Pt states SOB improved since AVNA. Scheduled to see Maday More today. Next Latitude scheduled for July. JORGE LUIS Jones RN.

## 2017-04-14 NOTE — MR AVS SNAPSHOT
After Visit Summary   4/14/2017    Rebecca Helms    MRN: [id number]           Patient Information     Date Of Birth          6/18/1928        Visit Information        Provider Department      4/14/2017 3:10 PM More, Maday Mckenzie PA-C [facility] [place]        Today's Diagnoses     Chronic diastolic CHF (congestive heart failure) (H)    -  1      Care Instructions    Call C[facility] Nurse for any questions or concerns:   Shyanne Vargas or Anne: [phone]   If you have concerns after hours, please call [phone], option 2    1. Medication changes: continue current medications.    Do not take your Lasix/ furosemide if you weigh less than 112 pounds.      2. Weigh yourself daily and write it down.     3. Call CORE nurse if your weight is up more than 2 pounds in one day, or 5 pounds in one week.    4. Call CORE nurse if you feel more short of breath, have more abdominal bloating or leg swelling.    5. Continue low sodium diet (less than 2000mg daily). If you eat less salt, you will retain less fluid.     6. Lab results:   Component      Latest Ref Rng & Units 4/14/2017   Sodium      136 - 145 mmol/L 137   Potassium      3.5 - 5.1 mmol/L 4.6   Chloride      98 - 107 mmol/L 103   Carbon Dioxide      23 - 29 mmol/L 29   Anion Gap      6 - 17 mmol/L 9.6   Glucose      70 - 105 mg/dL 89   Urea Nitrogen      7 - 30 mg/dL 21   Creatinine      0.70 - 1.30 mg/dL 1.19   GFR Estimate      >60 mL/min/1.7m2 43 (L)   GFR Estimate If Black      >60 mL/min/1.7m2 52 (L)   Calcium      8.5 - 10.5 mg/dL 10.1     **Do NOT take Aleve or Ibuprofen without checking with your doctor first        [facility]: Cardiomyopathy, Optimization, Rehabilitation, Education   The [facility] is a heart failure specialty clinic within the [facility] where you will work with specialized nurse practioners, physician assistants, doctors and registered nurses. They  are dedicated to helping patients with heart failure to carefully adjust medications, receive education, and learn who and when to call if symptoms develop. They specialize in helping you better understand your condition, slow the progression of your disease, improve the length and quality of your life, help you detect future heart problems before they become life threatening, and avoid hospitalizations.              Follow-ups after your visit        Additional Services     Follow-Up with [facility] Clinic - NATACHA visit                 Your next 10 appointments already scheduled     Jul 17, 2017  2:45 PM CDT   Remote PPM Check with MADRID TECH1   [facility] [place] ([facility])    [address] [place] [address] [id number] [phone]           This appointment is for a remote check of your pacemaker.  This is not an appointment at the office.              Future tests that were ordered for you today     Open Future Orders        Priority Expected Expires Ordered    Follow-Up with [facility] Clinic - NATACHA visit Routine 5/14/2017 4/14/2018 4/14/2017    Basic metabolic panel Routine 5/14/2017 4/14/2018 4/14/2017            Who to contact     If you have questions or need follow up information about today's clinic visit or your schedule please contact [facility] [place] directly at [phone].  Normal or non-critical lab and imaging results will be communicated to you by MyChart, letter or phone within 4 business days after the clinic has received the results. If you do not hear from us within 7 days, please contact the clinic through MyChart or phone. If you have a critical or abnormal lab result, we will notify you by phone as soon as possible.  Submit refill requests through [employer] or call your pharmacy and they will forward the refill request to us. Please allow 3 business days for your refill to be completed.          Additional Information  "About Your Visit        MyChart Information     [employer] gives you secure access to your electronic health record. If you see a primary care provider, you can also send messages to your care team and make appointments. If you have questions, please call your primary care clinic.  If you do not have a primary care provider, please call [phone] and they will assist you.        Care EveryWhere ID     This is your Care EveryWhere ID. This could be used by other organizations to access your [place] medical records  [id number]        Your Vitals Were     Pulse Respirations Height BMI (Body Mass Index)          76 16 1.6 m (5' 3\") 20.07 kg/m2         Blood Pressure from Last 3 Encounters:   04/14/17 122/78   04/08/17 149/63   03/20/17 142/78    Weight from Last 3 Encounters:   04/14/17 51.4 kg (113 lb 4.8 oz)   04/08/17 52.5 kg (115 lb 12.8 oz)   03/20/17 54.2 kg (119 lb 6.4 oz)              We Performed the Following     Follow-Up with [facility]          Today's Medication Changes          These changes are accurate as of: 4/14/17  4:00 PM.  If you have any questions, ask your nurse or doctor.               These medicines have changed or have updated prescriptions.        Dose/Directions    furosemide 40 MG tablet   Commonly known as:  LASIX   This may have changed:  additional instructions   Used for:  Acute diastolic congestive heart failure (H)        Dose:  40 mg   Take 1 tablet (40 mg) by mouth daily   Quantity:  30 tablet   Refills:  3                Primary Care Provider Office Phone # Fax #    Ren Gipson [phone]       [facility] [address]  [facility] [address]        Thank you!     Thank you for choosing [facility] [place]  for your care. Our goal is always to provide you with excellent care. Hearing back from our patients is one way we can continue to improve our services. Please take a few minutes to complete the written " survey that you may receive in the mail after your visit with us. Thank you!             Your Updated Medication List - Protect others around you: Learn how to safely use, store and throw away your medicines at www.disposemymeds.org.          This list is accurate as of: 4/14/17  4:00 PM.  Always use your most recent med list.                   Brand Name Dispense Instructions for use    apixaban ANTICOAGULANT 2.5 MG tablet    ELIQUIS    90 tablet    Take 2.5 mg by mouth 2 times daily       brimonidine 0.15 % ophthalmic solution    ALPHAGAN-P     Place 1 drop into both eyes 2 times daily       calcium-vitamin D 600-400 MG-UNIT per tablet    CALTRATE     Take 1 tablet by mouth daily       furosemide 40 MG tablet    LASIX    30 tablet    Take 1 tablet (40 mg) by mouth daily       latanoprost 0.005 % ophthalmic solution    XALATAN     Place 1 drop into both eyes At Bedtime       lisinopril 10 MG tablet    PRINIVIL/ZESTRIL    30 tablet    Take 2 tablets (20 mg) by mouth daily at night       metoprolol 25 MG 24 hr tablet    TOPROL-XL    180 tablet    Take 3 tablets (75 mg) by mouth daily       moxifloxacin 0.5 % ophthalmic solution    VIGAMOX     Place 1 drop Into the left eye daily as needed (eye infections)       Multi-vitamin Tabs tablet      Take 1 tablet by mouth daily.       order for DME     1 each    Equipment being ordered: Walker Wheels () and Walker () Treatment Diagnosis: difficulty with gait       pravastatin 20 MG tablet    PRAVACHOL    30 tablet    Take 1 tablet (20 mg) by mouth daily       timolol 0.5 % ophthalmic solution    TIMOPTIC     Place 1 drop into both eyes 2 times daily       TRAMADOL HCL PO      Take 50 mg by mouth At Bedtime Patient takes scheduled       TYLENOL ARTHRITIS PAIN 650 MG CR tablet   Generic drug:  acetaminophen      Take 650 mg by mouth daily

## 2017-04-17 NOTE — TELEPHONE ENCOUNTER
"Writer received a phone call from pt's [facility] RN, Roma, whom states pt c/o intermittent warm sensations since yesterday. VSS, weight stable, no SOB as reported by RN. Pt has [employer] PPM and has Latitude transmitter plugged in at all times in her kitchen. Writer called pt and she states that over the weekend she has 2 episodes of sensation of, \"warmth that starts on the top of head to my toes, then back up to my arms, and I get really weak that I couldn't even hold an apple.\" States this is different then any previous episodes she has experienced in the past Pt denies HA, vision changes, weakness on either side of body, slurred speech or difficulty swallowing. Pt asking if it could be a stroke and I instructed pt that I can not make any diagnosis, that if she is concerned about this to go to the ED. Pt refuses to go to ED or to send a Latitude transmission. Device was last checked on 4-14-17 and WNL, Encouraged to discuss concerns with PMD and states she is scheduled to see PMD on Wed. D. UZLLY Jones.  "

## 2017-04-17 NOTE — PROGRESS NOTES
Called pt, reviewed to discuss warm sensation with PCP. Also reviewed, given she was SOB today, take 1 tablet of Lasix today. Pt agreed. ZULLY Stafford

## 2017-04-17 NOTE — ED PROVIDER NOTES
History     Chief Complaint:  Generalized weakness, lightheadedness    HPI   Rebecca RANDLE Sundmanasa is a 88 year old female who presents with generalized weakness, lightheadedness, and shortness of breath. The patient states that this Friday, 4/14, she had an ablation performed. Over the weekend, she reports the development of weakness, lightheadedness, as well as increased shortness of breath. Today, prior to presentation while walking around her apartment building, the patient experienced a near syncopal episode secondary to her symptoms. She did not fall to the ground during this event. It should be noted that the patient is currently still taking Eliquis. She does have baseline pain as well as baseline leg swelling. The patient denies palpitations.     Cardiac Risk Factors:  CAD:    Pos  Hypertension:   Pos  Hyperlipidemia:  Pos  Diabetes:   Neg  Tobacco use:   Neg  Gender:   F  Age:    88  Familial Hx of CAD:  Pos    Allergies:  Amiodarone, visual disturbance       Medications:    lisinopril (PRINIVIL/ZESTRIL) 10 MG tablet  furosemide (LASIX) 40 MG tablet  metoprolol (TOPROL-XL) 25 MG 24 hr tablet  acetaminophen (TYLENOL ARTHRITIS PAIN) 650 MG CR tablet  moxifloxacin (VIGAMOX) 0.5 % ophthalmic solution  apixaban ANTICOAGULANT (ELIQUIS) 2.5 MG tablet  brimonidine (ALPHAGAN-P) 0.15 % ophthalmic solution  pravastatin (PRAVACHOL) 20 MG tablet  calcium-vitamin D (CALTRATE) 600-400 MG-UNIT per tablet  latanoprost (XALATAN) 0.005 % ophthalmic solution  timolol (TIMOPTIC) 0.5 % ophthalmic solution  TRAMADOL HCL PO  multivitamin, therapeutic with minerals (MULTI-VITAMIN) TABS    Past Medical History:    CAD  HTN  HLD  Osteoporosis  Sinus node dysfunction  Pulmonary HTN  Paroxysmal atrial fibrillation  Aortic stenosis  CHF  Lumbar radiculopathy      Past Surgical History:   Ovarian cystectomy  Tonsillectomy  Bilateral cataract  Dilation and curettage  Pacemaker implant     Family History:   MI     Social History:  Relationship  status:   Tobacco Use: Negative  Alcohol Use: Positive for occasional use.   The patient presents via EMS.  The patient is retired.     Review of Systems   Respiratory: Positive for shortness of breath.    Cardiovascular: Positive for leg swelling (baseline). Negative for palpitations.   Neurological: Positive for weakness (generalized ) and light-headedness.   All other systems reviewed and are negative.    Physical Exam   First Vitals:  BP: (!) 162/101  Pulse: 72  Resp: 16  SpO2: 97 %      Physical Exam  Constitutional: The patient is oriented to person, place, and time.   HENT:   Head: Atraumatic  Right Ear: Normal  Left Ear: Normal  Nose: Nose normal.   Mouth/Throat: Oropharynx is clear and moist. No erythema or exudate.   Eyes: Conjunctivae and EOM are normal. Pupils are equal, round, and reactive to light. No discharge  Neck: Normal range of motion. Neck supple.   Cardiovascular: Normal rate, regular rhythm, no murmur gallops or rubs. Intact distal pulses.    Pulmonary/Chest: CTA bilaterally. No wheezes rale or rhonchi.  Abdominal: Soft. Non tender.  No masses   Musculoskeletal: No edema. No bony deformity. Normal range of motion  Lymphadenopathy:     The patient has no cervical adenopathy.   Neurological: The patient is alert and oriented to person, place, and time. The patient has normal strength and normal reflexes. No cranial nerve deficit. Coordination normal. GCS 15  Skin: Skin is warm and dry. No rash noted. The patient is not diaphoretic.   Psychiatric: The patient has a normal mood and affect.    Generally weak, no focal findings. Pacemaker in left chest.     Emergency Department Course   ECG:  Indication: shortness of breath   Time: 1821  Vent. Rate 70 bpm. TX interval *. QRS duration 148. QT/QTc 478/516. P-R-T axis 109 173 36. Ventricular-paced rhythm. Abnormal ECG.  Read time: 1833    Imaging:  Radiographic findings were communicated with the patient who voiced understanding of the  findings.    XR chest, 2 views:  Cardiomegaly and pacemaker are unchanged. Right pleural  effusion is slightly decreased. Previous left pleural effusion has  resolved. No interstitial edema.  As per radiology.     Laboratory:  Labs Ordered and Resulted from Time of ED Arrival Up to the Time of Departure from the ED   BASIC METABOLIC PANEL - Abnormal; Notable for the following:        Result Value    Creatinine 1.23 (*)     GFR Estimate 41 (*)     GFR Estimate If Black 50 (*)     All other components within normal limits   CBC WITH PLATELETS DIFFERENTIAL - Abnormal; Notable for the following:     WBC 11.6 (*)     RDW 16.8 (*)     All other components within normal limits   NT PROBNP INPATIENT - Abnormal; Notable for the following:     N-Terminal Pro BNP Inpatient 67578 (*)     All other components within normal limits   BASIC METABOLIC PANEL   CBC WITH PLATELETS DIFFERENTIAL   NT PROBNP INPATIENT   TROPONIN I   TROPONIN I   PULSE OXIMETRY NURSING   CARDIAC CONTINUOUS MONITORING   PERIPHERAL IV CATHETER   ORTHOSTATIC BLOOD PRESSURE AND PULSE       Emergency Department Course:  IV inserted. Medicine administered as documented above.     Blood drawn. This was sent to the lab for further testing, results above.    Nursing notes and vitals reviewed.  I performed an exam of the patient as documented above.     EKG obtained in the ED, see results above.     The patient was sent for a chest XR while in the emergency department, findings above.       Impression & Plan      Medical Decision Making:  Rebecca Helms is a 88 year old female who presents after having a syncopal episode at home today. She notes that she has been having some episodes where she feels slightly weak. Today, she actually had a brief syncopal episode, passing out for about a second or two. Her work up here is fairly unremarkable. She is not orthostatic. EKG reveals a paced rhythm. Interrogation by [employer] shows some short runs at atrial  fibrillation, but no other acute abnormality. There are no signs of infection or electrolyte imbalance. I am concerned given the patient s significant valvular disease and history of CHF, with this syncopal episode, and feel that at least observation and admission is warranted. I discussed with the patient and she was in agreement. I spoke with Dr. Rivera of the hospitalist service. The patient will be admitted to the [facility] for further evaluation and treatment.     Diagnosis:    ICD-10-CM    1. Syncope, unspecified syncope type R55                       Discharge Medications:  New Prescriptions    No medications on file       I, Ruddy Farrar, am serving as a scribe on 4/17/2017 at 6:24 PM to personally document services performed by Anthony Landry MD based on my observations and the provider's statements to me.     Ruddy Farrar  4/17/2017    EMERGENCY DEPARTMENT       Anthony Landry MD  04/19/17 1130

## 2017-04-17 NOTE — PROGRESS NOTES
PRIMARY CARDIOLOGIST:  Dr. White, Dr. Monae and Dr. [place], also followed by Elisabeth Chakraborty and myself.      REASON FOR VISIT:  [facility] followup.      HISTORY OF PRESENT ILLNESS:  Ms. Helms is a delightful 88-year-old woman with a complex past cardiac history as follows:   1.  Moderate to severe aortic stenosis, moderate to severe mitral regurgitation and moderate to severe tricuspid regurgitation.   2.  Paroxysmal atrial fibrillation with intermittent tachycardia on Eliquis for anticoagulation, now status post AV node ablation.   3.  Sinus node dysfunction with permanent pacemaker in place.   4.  Hypertension.   5.  Dyslipidemia.   6.  Carotid stenosis.   7.  Heart failure with preserved EF.      I had met her earlier this winter after she was hospitalized for multiple episodes, some more profound fatigue and overwhelming doom.  There was a thought that her valve was severe enough and she was considered for TAVR, but on further evaluation, it was not as severe as thought.  There was some confusion about medications and the hospitalization around that, when it was then recognized that her AFib was causing both pulmonary edema and a feeling of doom.  She was initially started on amiodarone and see if this would control her rhythm, but she had visual side effects and it did not eliminate her tachy spells and most recently she had AV node ablation by Dr. Monae 1 week ago on 04/07.  This was an uncomplicated procedure, and she was discharged home.  We have been following her closely with tele-assurance.      She tells me today she overall feels well.  She still has a little bit of edema in her legs, but nothing compared to what it was.  Her weights have been stable on tele-assurance between about 110 and 113.  We currently have her holding her Lasix for weight less than 112.  She has not had any episodes of feeling of doom or tachycardia.  She has not been orthopneic.  She does not have PND.  She does wrap her legs at  home but not when she goes out so that she can wear shoes she likes.  She is seeing a doctor about her bladder later this month.  She overall at this point feels well.  In the past, though she has gone up to a week without spells, so we are hopeful that this continues.      SOCIAL HISTORY:  She has 2 daughters, 1 in [place] and believe the other in [place].  She has a niece dc who is a HUC at [employer] who tries to help her as well.  She lives in the [address] [place] [address] which is a coop.      PHYSICAL EXAMINATION:   GENERAL:  Well-developed, well-nourished elderly woman in no acute distress, well dressed.   HEENT:  Normocephalic, atraumatic.   NECK:  Veins are flat at 90 degrees, she walks with a walker legs have 2+ edema to mid shin and none in the upper shin.  Varicosities are noted.   HEART:  Regular with both systolic and diastolic murmurs.  Her systolic murmur is about a 3/6, her diastolic is about 2/6 heard throughout the precordium.   LUNGS:  Diminished in bilateral bases, but no wheezes, rales or rhonchi.      ASSESSMENT AND PLAN:   1.  Paroxysmal atrial fibrillation with episodes of tachycardia previously causing pulmonary edema and feelings of doom.  She is now status post AV node ablation and has a permanent pacemaker in place.  This is a biventricular device.  This is a dual-chamber pacemaker.  At this point, she is 100% V paced appropriately on check today.  We are all hopeful that the elimination of her tachycardia will eliminate pulmonary edema and episodes of doom.   2.  Heart failure with preserved EF with weight stable around 109-114.  She is holding her Lasix for less than 112.  We will continue with that program as she is doing well without symptoms of heart failure.   3.  Significant valvular disease with both moderate to severe aortic stenosis, moderate to severe tricuspid regurgitation and moderate to severe mitral regurgitation.  She has been evaluated by the T[facility]R Clinic and her aortic  valve was not felt to be severe enough to intervene upon at this time we will follow this closely.   4.  Hypertension, well controlled.   5.  Dyslipidemia, followed by primary, excellent control.      I am going to see this patient back in 1 month in [facility], will continue with tele-assurance, will get a BMP at that time.  We will see her sooner with concerns.  She will need routine pacemaker followup.      SULEMAN Smith PA-C             D: 2017 16:17   T: 2017 03:18   MT: CARLO      Name:     ANTHONY KEMP   MRN:      [id number]        Account:      [id number]   :      1928           Service Date: 2017      Document: [id number]

## 2017-04-17 NOTE — IP AVS SNAPSHOT
[facility]    [address] [place] [address]., [address]    [place] [address]    Phone:  [phone]                                       After Visit Summary   4/17/2017    Rebecca Helms    MRN: [id number]           After Visit Summary Signature Page     I have received my discharge instructions, and my questions have been answered. I have discussed any challenges I see with this plan with the nurse or doctor.    ..........................................................................................................................................  Patient/Patient Representative Signature      ..........................................................................................................................................  Patient Representative Print Name and Relationship to Patient    ..................................................               ................................................  Date                                            Time    ..........................................................................................................................................  Reviewed by Signature/Title    ...................................................              ..............................................  Date                                                            Time

## 2017-04-17 NOTE — PROGRESS NOTES
TELEMANAGEMENT: Wt 111#, same the last 4 days, within wt parameters with shortness of breath, nighttime shortness of breath, and dizziness reported on survey. Pt takes Lasix 40mg daily, and holds Lasix if wt <112#. Called pt, she states she has a new problem. Once yesterday and once today, she got this warm feeling that started in her head, worked its way down her body to her toes, and the worked its way back up to her arms and then went away. Pt states she thought she was having a stroke but it went away. Pt denies feeling dizzy. Pt states it was just a warm sensation and she felt weak. Pt states she was able to talk just fine. Yesterday she was just sitting when it happened and today she was walking. She does not know what it was. She felt SOB this am when she was getting dressed, but denies SOB now. Pt verified she has not taken her Lasix in the last 4-5 days because wt <112#. Will send update to Bradly Stafford RN

## 2017-04-17 NOTE — PROGRESS NOTES
I'm unclear of the cause of the warm feeling- she can discuss with pcp.  Let's have her take her lasix today given her sob.  She appeared euvolemic on Friday, but was asymptomatic at that time.  Maday Diaz PA-C 4/17/2017 2:45 PM

## 2017-04-17 NOTE — IP AVS SNAPSHOT
MRN:[id number]                      After Visit Summary   4/17/2017    Rebecca RANDLE Sundt    MRN: [id number]           Thank you!     Thank you for choosing [facility] for your care. Our goal is always to provide you with excellent care. Hearing back from our patients is one way we can continue to improve our services. Please take a few minutes to complete the written survey that you may receive in the mail after you visit with us. Thank you!        Patient Information     Date Of Birth          6/18/1928        Designated Caregiver       Most Recent Value    Caregiver    Will someone help with your care after discharge? no      About your hospital stay     You were admitted on:  April 18, 2017 You last received care in the:  [facility]    You were discharged on:  April 19, 2017        Reason for your hospital stay       Syncopal episode                  Who to Call     For medical emergencies, please call 911.  For non-urgent questions about your medical care, please call your primary care provider or clinic, [phone]          Attending Provider     Provider Specialty    Anthony Landry MD Emergency Medicine    Nistor, Roseanne Valente MD Internal Medicine       Primary Care Provider Office Phone # Fax #    Ren Gipson [phone]       [facility] [address] [facility] [address]        After Care Instructions     Activity       Your activity upon discharge: activity as tolerated            Diet       Follow this diet upon discharge: Orders Placed This Encounter      Combination Diet Low Saturated Fat Na <2400mg Diet, No Caffeine Diet                  Follow-up Appointments     Follow-up and recommended labs and tests        Follow up with primary care provider, Ren Gipson, within 7 days for hospital follow- up.  No follow up labs or test are needed.    Follow up scheduled for Wednesday, April 26th at 10am with   [facility] at [facility]                  Your next 10 appointments already scheduled     May 17, 2017 11:30 AM CDT   LAB with [facility]   [facility] ([facility]    [address] [place] [address] [address]  [facility] [id number] [phone]           Patient must bring picture ID.  Patient should be prepared to give a urine specimen  Please do not eat 10-12 hours before your appointment if you are coming in fasting for labs on lipids, cholesterol, or glucose (sugar).  Pregnant women should follow their Care Team instructions. Water with medications is okay. Do not drink coffee or other fluids.   If you have concerns about taking  your medications, please ask at office or if scheduling via [employer], send a message by clicking on Secure Messaging, Message Your Care Team.            May 17, 2017 12:30 PM CDT   Core Return with Maday Diaz PA-C   [facility] ([facility]    [address] [place], [place] [address]  [facility] [address] [phone]            Jul 17, 2017  2:45 PM CDT   Remote PPM Check with [facility] TECH1   [facility]    [address] [place], [place] [address]  [facility] [address] [phone]           This appointment is for a remote check of your pacemaker.  This is not an appointment at the office.              Additional Services     Home Care OT Referral for Hospital Discharge       OT to eval and treat    Your provider has ordered home care - occupational therapy. If you have not been contacted within 2 days of your discharge please call the department phone number listed on the top of this document.            Home Care PT Referral for Hospital Discharge       PT to eval and treat    Your provider has ordered home care - physical therapy. If you have not been contacted within 2 days of your discharge please call the department phone  number listed on the top of this document.            Home care nursing referral       Resumption of HomeCare    RN skilled nursing visit. RN to assess vital signs and weight.    Your provider has ordered home care nursing services. If you have not been contacted within 2 days of your discharge please call the inpatient department phone number at [phone] .                  Further instructions from your care team         Orthostatic Low Blood Pressure (Hypotension)  A blood pressure reading is made up of 2 numbers There is a top number over a bottom number. The top number is the systolic pressure. The bottom number is the diastolic pressure. A normal blood pressure is less than 120 over less than 80. Low blood pressure (hypotension) is a blood pressure that is less than what is normal for you.  Orthostatic hypotension is a type of low blood pressure that occurs only when you change position from lying to standing. It can cause dizziness, lightheadedness, or fainting.  Medicines can cause orthostatic hypotension. These include:    High blood pressure medicines    Water pills (diuretics)    Some heart medicines    Some antidepressants    Pain, anxiety, sedative, and sleeping medicines  Other causes include:    Dehydration from vomiting, diarrhea, or not getting enough fluids    Severe infection    High fever    Blood loss. This could be bleeding from the stomach or intestines.  Treatment will depend on what is causing your low blood pressure.  Home care  Follow these guidelines when caring for yourself at home:    Rest until your symptoms get better.    Change positions slowly from lying to standing. When getting out of bed, sit on the side of the bed with your legs down for at least 30 seconds before standing. This gives your body time to adjust to the position change.    Follow the treatment plan described by your health care provider.  Follow-up care  Follow up with your health care provider, or as  advised.  When to seek medical advice  Call your health care provider right away if any of these occur:    Dizziness, lightheadedness, or fainting    Black or red color in your stools or vomit    Diarrhea or vomiting that doesn t go away    You aren t able to eat or drink    Fever of 100.4 F (38 C) or higher, or as directed by your health care provider    Burning when you urinate    Foul-smelling urine    4287-7385 The hike. [address] [place] [address]. All rights reserved. This information is not intended as a substitute for professional medical care. Always follow your healthcare professional's instructions.      Patient will discharge to home with a resumption of homecare services for nursing in addition to PT/OT.  [facility] will contact patient directly to arrange for the first visit.  [facility]'s phone number is [phone].        Pending Results     No orders found from 4/15/2017 to 4/18/2017.            Statement of Approval     Ordered          04/19/17 1121  I have reviewed and agree with all the recommendations and orders detailed in this document.  EFFECTIVE NOW     Approved and electronically signed by:  Nolan Shaw MD             Admission Information     Date & Time Provider Department Dept. Phone    4/17/2017 Roseanne Rivera MD [facility] [phone]      Your Vitals Were     Blood Pressure Pulse Temperature Respirations Weight Pulse Oximetry    111/60 90 98  F (36.7  C) (Oral) 17 47.7 kg (105 lb 2.6 oz) 96%    BMI (Body Mass Index)                   18.63 kg/m2           MyChart Information     [employer] Program gives you secure access to your electronic health record. If you see a primary care provider, you can also send messages to your care team and make appointments. If you have questions, please call your primary care clinic.  If you do not have a primary care provider, please call [phone] and they  will assist you.        Care EveryWhere ID     This is your Care EveryWhere ID. This could be used by other organizations to access your [facility] medical records  [id number]           Review of your medicines      START taking        Dose / Directions    amLODIPine 10 MG tablet   Commonly known as:  NORVASC   Used for:  Essential hypertension   Notes to Patient:  Pt had 10mg total on 4/19.         Dose:  10 mg   Start taking on:  4/20/2017   Take 1 tablet (10 mg) by mouth daily   Quantity:  30 tablet   Refills:  0         CONTINUE these medicines which may have CHANGED, or have new prescriptions. If we are uncertain of the size of tablets/capsules you have at home, strength may be listed as something that might have changed.        Dose / Directions    lisinopril 20 MG tablet   Commonly known as:  PRINIVIL/ZESTRIL   This may have changed:    - medication strength  - how much to take   Used for:  Essential hypertension        Dose:  20 mg   Take 1 tablet (20 mg) by mouth daily   Quantity:  30 tablet   Refills:  0         CONTINUE these medicines which have NOT CHANGED        Dose / Directions    apixaban ANTICOAGULANT 2.5 MG tablet   Commonly known as:  ELIQUIS        Dose:  2.5 mg   Take 2.5 mg by mouth 2 times daily   Quantity:  90 tablet   Refills:  1       brimonidine 0.15 % ophthalmic solution   Commonly known as:  ALPHAGAN-P        Dose:  1 drop   Place 1 drop into both eyes 2 times daily   Refills:  0       calcium-vitamin D 600-400 MG-UNIT per tablet   Commonly known as:  CALTRATE        Dose:  1 tablet   Take 1 tablet by mouth daily   Refills:  0       latanoprost 0.005 % ophthalmic solution   Commonly known as:  XALATAN        Dose:  1 drop   Place 1 drop into both eyes At Bedtime   Refills:  0       moxifloxacin 0.5 % ophthalmic solution   Commonly known as:  VIGAMOX        Dose:  1 drop   Place 1 drop Into the left eye daily as needed (eye infections)   Refills:  0       Multi-vitamin Tabs tablet         Dose:  1 tablet   Take 1 tablet by mouth daily.   Refills:  0       order for DME   Used for:  Acute on chronic diastolic congestive heart failure (H)        Equipment being ordered: Walker Wheels () and Walker () Treatment Diagnosis: difficulty with gait   Quantity:  1 each   Refills:  0       pravastatin 20 MG tablet   Commonly known as:  PRAVACHOL   Used for:  CAD (coronary artery disease)        Dose:  20 mg   Take 1 tablet (20 mg) by mouth daily   Quantity:  30 tablet   Refills:  11       timolol 0.5 % ophthalmic solution   Commonly known as:  TIMOPTIC        Dose:  1 drop   Place 1 drop into both eyes 2 times daily   Refills:  0       TRAMADOL HCL PO        Dose:  50 mg   Take 50 mg by mouth At Bedtime Patient takes scheduled   Refills:  0       TYLENOL ARTHRITIS PAIN 650 MG CR tablet   Generic drug:  acetaminophen        Dose:  1300 mg   Take 1,300 mg by mouth 2 times daily   Refills:  0         STOP taking     furosemide 40 MG tablet   Commonly known as:  LASIX           metoprolol 25 MG 24 hr tablet   Commonly known as:  TOPROL-XL                Where to get your medicines      These medications [place] [facility] [place] [address]  [address] [facility] [address] [facility] [address]     Phone:  [phone]     amLODIPine 10 MG tablet    lisinopril 20 MG tablet                Protect others around you: Learn how to safely use, store and throw away your medicines at www.disposemymeds.org.             Medication List: This is a list of all your medications and when to take them. Check marks below indicate your daily home schedule. Keep this list as a reference.      Medications           Morning Afternoon Evening Bedtime As Needed    amLODIPine 10 MG tablet   Commonly known as:  NORVASC   Take 1 tablet (10 mg) by mouth daily   Start taking on:  4/20/2017   Last time this was given:  5 mg on 4/19/2017 11:17 AM   Next Dose Due:  4/20/17   Notes to Patient:  Pt had 10mg  total on 4/19.                                    apixaban ANTICOAGULANT 2.5 MG tablet   Commonly known as:  ELIQUIS   Take 2.5 mg by mouth 2 times daily   Last time this was given:  2.5 mg on 4/19/2017  8:38 AM   Next Dose Due:  4/20/17                                   brimonidine 0.15 % ophthalmic solution   Commonly known as:  ALPHAGAN-P   Place 1 drop into both eyes 2 times daily                                      calcium-vitamin D 600-400 MG-UNIT per tablet   Commonly known as:  CALTRATE   Take 1 tablet by mouth daily                                   latanoprost 0.005 % ophthalmic solution   Commonly known as:  XALATAN   Place 1 drop into both eyes At Bedtime   Last time this was given:  1 drop on 4/18/2017  9:59 PM   Next Dose Due:  4/19/17                                   lisinopril 20 MG tablet   Commonly known as:  PRINIVIL/ZESTRIL   Take 1 tablet (20 mg) by mouth daily   Last time this was given:  20 mg on 4/19/2017  8:38 AM   Next Dose Due:  4/20/17                                   moxifloxacin 0.5 % ophthalmic solution   Commonly known as:  VIGAMOX   Place 1 drop Into the left eye daily as needed (eye infections)                                   Multi-vitamin Tabs tablet   Take 1 tablet by mouth daily.                                   order for DME   Equipment being ordered: Walker Wheels () and Walker () Treatment Diagnosis: difficulty with gait                                pravastatin 20 MG tablet   Commonly known as:  PRAVACHOL   Take 1 tablet (20 mg) by mouth daily   Last time this was given:  20 mg on 4/18/2017  9:56 PM   Next Dose Due:  4/19/17                                   timolol 0.5 % ophthalmic solution   Commonly known as:  TIMOPTIC   Place 1 drop into both eyes 2 times daily   Last time this was given:  1 drop on 4/19/2017  9:34 AM   Next Dose Due:  4/19/17                                       TRAMADOL HCL PO   Take 50 mg by mouth At Bedtime Patient takes scheduled                                    TYLENOL ARTHRITIS PAIN 650 MG CR tablet   Take 1,300 mg by mouth 2 times daily   Generic drug:  acetaminophen                                                More Information        Amlodipine Besylate Oral tablet  What is this medicine?  AMLODIPINE (am HUMAIRA thomas) is a calcium-channel blocker. It affects the amount of calcium found in your heart and muscle cells. This relaxes your blood vessels, which can reduce the amount of work the heart has to do. This medicine is used to lower high blood pressure. It is also used to prevent chest pain.  This medicine may be used for other purposes; ask your health care provider or pharmacist if you have questions.  What should I tell my health care provider before I take this medicine?  They need to know if you have any of these conditions:    heart problems like heart failure or aortic stenosis    liver disease    an unusual or allergic reaction to amlodipine, other medicines, foods, dyes, or preservatives    pregnant or trying to get pregnant    breast-feeding  How should I use this medicine?  Take this medicine by mouth with a glass of water. Follow the directions on the prescription label. Take your medicine at regular intervals. Do not take more medicine than directed.  Talk to your pediatrician regarding the use of this medicine in children. Special care may be needed. This medicine has been used in children as young as 6.  Persons over 65 years old may have a stronger reaction to this medicine and need smaller doses.  Overdosage: If you think you have taken too much of this medicine contact a poison control center or emergency room at once.  NOTE: This medicine is only for you. Do not share this medicine with others.  What if I miss a dose?  If you miss a dose, take it as soon as you can. If it is almost time for your next dose, take only that dose. Do not take double or extra doses.  What may interact with this medicine?    herbal or  dietary supplements    local or general anesthetics    medicines for high blood pressure    medicines for prostate problems    rifampin  This list may not describe all possible interactions. Give your health care provider a list of all the medicines, herbs, non-prescription drugs, or dietary supplements you use. Also tell them if you smoke, drink alcohol, or use illegal drugs. Some items may interact with your medicine.  What should I watch for while using this medicine?  Visit your doctor or health care professional for regular check ups. Check your blood pressure and pulse rate regularly. Ask your health care professional what your blood pressure and pulse rate should be, and when you should contact him or her.  This medicine may make you feel confused, dizzy or lightheaded. Do not drive, use machinery, or do anything that needs mental alertness until you know how this medicine affects you. To reduce the risk of dizzy or fainting spells, do not sit or stand up quickly, especially if you are an older patient. Avoid alcoholic drinks; they can make you more dizzy.  Do not suddenly stop taking amlodipine. Ask your doctor or health care professional how you can gradually reduce the dose.  What side effects may I notice from receiving this medicine?  Side effects that you should report to your doctor or health care professional as soon as possible:    allergic reactions like skin rash, itching or hives, swelling of the face, lips, or tongue    breathing problems    changes in vision or hearing    chest pain    fast, irregular heartbeat    swelling of legs or ankles  Side effects that usually do not require medical attention (report to your doctor or health care professional if they continue or are bothersome):    dry mouth    facial flushing    nausea, vomiting    stomach gas, pain    tired, weak    trouble sleeping  This list may not describe all possible side effects. Call your doctor for medical advice about side  effects. You may report side effects to FDA at 9-897-KQX-8680.  Where should I keep my medicine?  Keep out of the reach of children.  Store at room temperature between 59 and 86 degrees F (15 and 30 degrees C). Protect from light. Keep container tightly closed. Throw away any unused medicine after the expiration date.  NOTE:This sheet is a summary. It may not cover all possible information. If you have questions about this medicine, talk to your doctor, pharmacist, or health care provider. Copyright  2016 Gold Standard

## 2017-04-18 PROBLEM — R55 SYNCOPE: Status: ACTIVE | Noted: 2017-01-01

## 2017-04-18 NOTE — PROGRESS NOTES
Sinus node dysfunction, pacer implanted on 9/12/2014.   Symptomatic paroxysmal atrial fib with RVR, sp AVN ablation on 4/7/2017.  Admitted for severe weakness and lightheadedness.  Pacemaker interrogated, normal function.  History of moderate pulmonary hypertension, moderate AS with low gradient, severe tricuspid regurgitation, severe osteoporosis, hypertension, carotid stenosis and chronic renal insufficiency.    Likely had an episode of orthostatic hypotension.  Changed metoprolol 75 mg to amlodipine 5 mg po daily. May titrate the dose according to BP.  No other recommendation.

## 2017-04-18 NOTE — PLAN OF CARE
Problem: Goal Outcome Summary  Goal: Goal Outcome Summary  PT: Orders received, evaluation completed, treatment initiated.88 year old female admitted for episdoe of weakness/syncope. PMH for aortic stenosis, severe valvular disease, HTN. Pt reports living in senior living facility by herself. RN visits daily for medical management. No stairs to get into or within living environment. Prior to admission, ambulates with modified independence, using both furniture and FWW for stability. Modified independent with dressing, bathing, and transfers. Currently, independent with supine to/from sit. SBA with sit to/from stand x2. Ambulate 225' with SBA and FWW. Ambulated 10' to/from batroom with FWW and SBA.   Recommend discharge to home with a home safety eval from PT. Recommend patient to use FWW with all mobility on discharge. No further acute care PT/OT needs identified at this time - will complete order.     Recommend patient to continue walking with nursing 3x/day with FWW.

## 2017-04-18 NOTE — PHARMACY-ADMISSION MEDICATION HISTORY
Admission medication history interview status for the 4/17/2017  admission is complete. See EPIC admission navigator for prior to admission medications     Medication history source reliability:Good    Actions taken by pharmacist (provider contacted, etc):  Called [facility] to clarify some doses.     Additional medication history information not noted on PTA med list :None    Medication reconciliation/reorder completed by provider prior to medication history? Yes    Time spent in this activity: 15 minutes    Prior to Admission medications    Medication Sig Last Dose Taking? Auth Provider   lisinopril (PRINIVIL/ZESTRIL) 10 MG tablet Take 10 mg by mouth daily  4/17/2017 Yes Vidal Hernandez MD   metoprolol (TOPROL-XL) 25 MG 24 hr tablet Take 3 tablets (75 mg) by mouth daily 4/17/2017 Yes Luis White MD   acetaminophen (TYLENOL ARTHRITIS PAIN) 650 MG CR tablet Take 1,300 mg by mouth 2 times daily  4/17/2017 Yes Unknown, Entered By History   moxifloxacin (VIGAMOX) 0.5 % ophthalmic solution Place 1 drop Into the left eye daily as needed (eye infections)  prn Yes Unknown, Entered By History   apixaban ANTICOAGULANT (ELIQUIS) 2.5 MG tablet Take 2.5 mg by mouth 2 times daily  4/17/2017 Yes Marissa Warren MD   brimonidine (ALPHAGAN-P) 0.15 % ophthalmic solution Place 1 drop into both eyes 2 times daily  4/17/2017 Yes Reported, Patient   pravastatin (PRAVACHOL) 20 MG tablet Take 1 tablet (20 mg) by mouth daily 4/17/2017 at pm Yes Praful Monae MD   calcium-vitamin D (CALTRATE) 600-400 MG-UNIT per tablet Take 1 tablet by mouth daily  4/17/2017 Yes Unknown, Entered By History   latanoprost (XALATAN) 0.005 % ophthalmic solution Place 1 drop into both eyes At Bedtime 4/17/2017 Yes Unknown, Entered By History   timolol (TIMOPTIC) 0.5 % ophthalmic solution Place 1 drop into both eyes 2 times daily 4/17/2017 Yes Unknown, Entered By History   TRAMADOL HCL PO Take 50 mg by mouth At Bedtime Patient takes  scheduled 4/17/2017 at hs Yes Unknown, Entered By History   multivitamin, therapeutic with minerals (MULTI-VITAMIN) TABS Take 1 tablet by mouth daily. 4/17/2017 Yes Reported, Patient   furosemide (LASIX) 40 MG tablet Take 1 tablet (40 mg) by mouth daily  Patient taking differently: Take 40 mg by mouth daily Holds for wt <112lbs 4/16/2017  Emily, SYLVIA DuffC   order for DME Equipment being ordered: Walker Wheels () and Walker ()  Treatment Diagnosis: difficulty with gait   Sumanth Rincon MD

## 2017-04-18 NOTE — PLAN OF CARE
Problem: Goal Outcome Summary  Goal: Goal Outcome Summary  OT: orders rec'd and chart reviewed. Pt admitted under observation status due to weakness and syncope. Spoke with PT and pt independent with ADL's, no OT needs at this time, will complete OT orders.

## 2017-04-18 NOTE — PROGRESS NOTES
"\"What Is Outpatient Observation\" brochure was given & explained to the patient. Questions answered. Patient verbalized understanding.    "

## 2017-04-18 NOTE — H&P
"DATE OF ADMISSION:  04/17/2017      PRIMARY CARE PHYSICIAN:  Ren Gipson MD      CHIEF COMPLAINT:  Episode of weakness, lightheadedness and an episode of syncope.      HISTORY OF PRESENT ILLNESS:  Obtained only partially from Rebecca Helms because she is a poor historian.  I did discuss with the ER attending and I reviewed her chart extensively.  Mrs. Rebecca Helms is a pleasant 88-year-old female with past medical history of hypertension, dyslipidemia, diastolic CHF, multiple valvular disease with moderate to severe aortic stenosis, moderate to severe mitral regurgitation and moderate to severe tricuspid regurgitation, history of sick sinus syndrome, status post pacemaker placement, history of paroxysmal atrial fibrillation with intermittent tachycardia, on Eliquis, status post recent AV node ablation, history of osteoporosis, who was brought in for evaluation of an episode of weakness and an episode of syncope.      It seems that in the past the patient experienced multiple episodes of overwhelming doom and profound fatigue that was thought to be due to her severe valvular disease and she was considered for TAVR, but upon further evaluation her aortic stenosis was not as severe as initially thought.  She was found to have episodes of a-fib thought to be causing pulmonary edema and a feeling of doom.  It seems that she did not tolerate amiodarone in the past so she had IV node ablation by Dr. Monae on 04/07/2017.  The patient states that 1 week after the procedure she actually felt well, but 1 week ago she started having again episodes of weakness.  She has a hard time describing these episodes, but basically describes that she starts feeling warm down to her toes and again up in her arms, described as \"electricity going down into her body and back in her arms\"  These episodes are associated with generalized weakness.  The patient states that yesterday she tried to go to get her mail and on her way she started " feeling lightheaded and dizzy and apparently she passed out for a while.  The nurse was around her and helped her sit in a chair so she did not fall.  The patient thinks that she could not talk during that time; 911 was called and the patient was brought to [place] [facility] for further evaluation and management.      Upon further questioning, she denies chest pain, although she does report having some upper abdominal pressure discomfort.  She reports that she has to take her bra off later on in the day because of this chest pain.  She denies any shortness of breath.  She does have chronic lower extremity swelling which she reports now looks much better compared to her prior exam.  She also reports feeling nauseated with these episodes, but no vomiting.  She denies any diarrhea.  Actually she reports feeling constipated at times.  Again, she denies any fevers.  No cold symptoms.  No headaches.      In the ER, the patient was seen by Dr. Landry.  Her initial vitals showed blood pressure of 162/101 and later on improved to 155/90.  Her heart rate was 73, respiratory rate 18, oxygen saturation 97% on room air, temperature 97.8.  She did have basic blood work which showed CBC with mildly elevated white blood cells of 11.6.  BMP mildly elevated, creatinine 1.23.  ProBNP was elevated at 10,399.  Initial troponin 0.27.  Her chest x-ray showed cardiomegaly and pacemaker which are unchanged.  Previous left pleural effusions have resolved.  No interstitial edema.  Her EKG shows ventricular paced rhythm, no ischemic changes.      Apparently, her pacemaker was interrogated in the ER which showed some prior runs of AC, but no significant arrhythmias recorded today.      Because of these new and recurrent episodes of weakness and syncope, the Hospitalist Service was called regarding the admission.      PAST MEDICAL HISTORY:   1.  Hypertension.   2.  Dyslipidemia.   3.  Severe valvular disease with moderate to severe 3+ mitral  regurgitation, moderate to severe 3-4+ tricuspid regurgitation and moderate to severe aortic stenosis.   4.  History of paroxysmal atrial fibrillation with intermittent tachycardia, status post IV node ablation.  She is on Eliquis.   5.  Sinus node dysfunction, status post pacemaker placement.   6.  Diastolic congestive heart failure with preserved ejection fraction with EF of 55% to 60% in 02/2017.   7.  Carotid artery stenosis.      PAST SURGICAL HISTORY:   1.  Tonsillectomy.   2.  D&C.   3.  Cystectomy.   4.  Bilateral eye surgery for cataracts.   5.  Status post pacemaker placement.   6.  Status post AV node ablation earlier this month by Dr. Monae.      SOCIAL HISTORY:  The patient lives in a senior citizen facility.  She has a home RN coming to her place daily.  She denies smoking.  She denies alcohol drinking and she denies illicit drug abuse.      FAMILY HISTORY:  Reviewed with the patient and felt to be noncontributory to current admission.      PRIOR TO ADMISSION MEDICATIONS:  Her medications need to be verified by the pharmacy, but it seems that she is on:   1  Tramadol 50 mg by mouth at bedtime.   2.  Lisinopril 20 mg p.o. daily.   3.  Furosemide, Lasix 40 mg p.o. daily.   4.  Metoprolol 75 mg p.o. daily.   5.  Tylenol Arthritis Pain 650 mg p.o. daily.   6.  Eliquis 2.5 mg p.o. twice daily.   7.  Alphagan 0.15 ophthalmic solution, place 1 drop in both eyes twice daily.   8.  Pravastatin 20 mg by mouth daily.   9.  Calcium/vitamin D 1 tablet p.o. daily.   10.  Xalatan 0.005% ophthalmic solution, 1 drop in both eyes at bedtime.   11.  Timolol 0.5% ophthalmic solution, 1 drop in both eyes twice daily.   12.  Multivitamin 1 tablet p.o. daily.      ALLERGIES:  No known drug allergies.      REVIEW OF SYSTEMS:  The 10-point review of systems was conducted and it was negative except for pertinent positives mentioned in history of present illness.      PHYSICAL EXAMINATION:   VITAL SIGNS:  Blood pressure 141/93,  heart rate 77, respiratory rate 18, oxygen saturation 97% on room air and temperature 97.8.   GENERAL:  The patient is awake, alert, no acute distress at the time of my examination.   HEENT:  Normocephalic, atraumatic.  Pupils are equally round and reactive to light.  Oral mucosa is moist.   NECK:  Supple.  No cervical lymphadenopathy, no thyromegaly.   CHEST:  There is bilateral air entry, no wheezing, no rales, no crackles.   CARDIOVASCULAR:  There is normal S1, S2, regular rate and rhythm.  There is a systolic murmur 2/6 intensity, precordial area.  No rubs.   ABDOMEN:  Soft, nontender, nondistended.  Bowel sounds are present.   EXTREMITIES:  There is 1+ pitting edema bilateral lower extremities, but states that is chronic.  There is tenderness on palpation of her anterior shins.   SKIN:  Intact, no rashes, no cyanosis.   NEUROLOGIC:  The patient is awake, alert, oriented x2.  There are no focal neurological deficits.  Motor strength is 5/5 intensity, both upper and lower extremities.  Sensory is grossly intact.   PSYCHIATRIC:  Normal mood, normal affect.   MUSCULOSKELETAL:  She moves all extremities freely.  There are no obvious joint deformities.      LABORATORY DATA:  Sodium 138, potassium 4.4, chloride 103, bicarbonate 24, BUN 29, creatinine 1.23.  Calcium is 9, anion gap 11.  ProBNP elevated at 10,399.  Initial troponin 0.027.  White blood cells 11.6, hemoglobin 13.8, platelet count 41.3.      IMAGING:  Her chest x-ray showed cardiomegaly and pacemaker which are unchanged.  The right pleural effusion is slightly decreased and previous left pleural effusion has resolved.  No interstitial edema.      Her EKG shows ventricular paced rhythm with no ischemic changes.      ASSESSMENT:  Mrs. Rebecca Helms is a pleasant 88-year-old female with complex past medical history significant for hypertension, dyslipidemia, diastolic congestive heart failure with preserved ejection fraction, severe valvular disease with severe  "aortic stenosis, moderate to severe mitral regurgitation and moderate to severe tricuspid regurgitation, history of paroxysmal atrial fibrillation with intermittent tachycardia, status post atrioventricular node ablation, on Eliquis, history of sick sinus syndrome, status post pacemaker placement, who came in for evaluation of recurrent episodes of weakness and 1 episode of syncope.      PLAN:   1.  Episode of weakness/syncope, unclear etiology at this time.  She does have a history of paroxysmal a-fib with intermittent episodes of a-fib with RVR in the past.  It seems that she could not tolerate amiodarone in the past and she underwent AV node ablation earlier this month by Dr. Monae.  She states that she actually felt fine for 1 week after the ablation, but now she started having recurrent episodes of weakness for the last week.  She has a hard time describing these episodes, basically describing it as \"electricity going on in her body.\"  She also had an episode of syncope yesterday.  Her vitals were normal in the ER.  EKG showed ventricular paced rhythm.  Apparently pacemaker was interrogated and showed the prior episodes of a-fib, but no significant arrhythmia yesterday.  Her blood work looks quite unremarkable.  Initial troponin is 0.027.  Her proBNP is elevated at 10,399, but clinically she does not seem fluid overloaded.  Plan for now is to admit her to [facility] to monitor her on telemetry.  We will check orthostasis.  We will check serial troponins.  We are going to have a repeat echocardiogram now and EP consult was requested.  We will hold off IV fluids at this time unless orthostasis is positive.   2.  Severe valvular disease with moderate to severe aortic stenosis, moderate to severe mitral regurgitation and moderate to severe tricuspid regurgitation.  It seems that in the past she was considered for TAVR, but upon further evaluation her aortic stenosis was not as severe as initially thought.  I am going to " repeat echocardiogram at this time and further management as per EP/Cardiology.   3.  History of diastolic congestive heart failure with preserved ejection fraction.  Her echocardiogram in 02/2017 showed an EF of 55% to 60% and also with severe valvular disease as mentioned above.  Apparently she seems euvolemic.  Prior to admission she was on Lasix 40 mg p.o. daily.  Her proBNP is elevated at 10,399, but she actually denies symptoms of CHF exacerbation.  She denies shortness of breath and reports her leg swelling seems actually much better now compared with how it looked a couple of months ago. Plan is to continue her prior to admission Lasix and will closely monitor her fluid status.  We will also closely monitor her creatinine.   2.  History of paroxysmal atrial fibrillation, on chronic anticoagulation with Eliquis, which will be continued at this time.  It seems that she had episodes of a-fib with RVR in the past and she underwent AV node ablation by Dr. Monae earlier this month.  Currently her EKG showed ventricular paced rhythm.  Plan is to continue her prior to admission metoprolol 75 mg p.o. daily and Eliquis.   3.  History of hypertension.  Her blood pressure seems to be reasonably well controlled.  We will continue her prior to admission lisinopril 20 mg p.o. daily and Toprol-XL 25 mg p.o. daily with holding parameters.   4.  History of dyslipidemia.  We will continue her prior to admission pravastatin.   5.  Chronic kidney disease, stage 3.  Baseline creatinine between 1.1 and 1.3, admitted with creatinine 1.23 which seems to be around her baseline.  We will closely monitor her kidney function.   6.  DVT prophylaxis.  The patient is fully anticoagulated with Eliquis.      CODE STATUS:  Discussed with the patient and the patient is DNR/DNI.      DISPOSITION:  I anticipate 1-2 days of hospitalization pending EP consult and recommendations.         TANGELA GONZALEZ MD             D: 04/18/2017 08:35   T:  2017 10:00   MT: SK      Name:     ANTHONY KEMP   MRN:      [id number]        Account:      [id number]   :      1928           Admitted:     [id number]      Document: [id number]

## 2017-04-18 NOTE — DOWNTIME EVENT NOTE
The EMR was down  On 04/17/2017 at 20:00 to 04/18/2017 at 05:00am.    RN was responsible for completing the paper charting during this time period.     The following information was re-entered into the system by Mariajose Yu- MAR entered by Gala    The following information will remain in the paper chart: Vitals and assessment.     Mariajose Arndt  4/18/2017

## 2017-04-18 NOTE — PROGRESS NOTES
[facility]  Patient is currently open to home care services with [facility].  The patient is currently receiving RN services.  [facility]  and team have been notified that patient is under OBSERVATION STATUS. [facility] liaison will continue to follow patient during stay.  If patient is admitted to inpatient status please provide orders to resume home care at time of discharge if appropriate.

## 2017-04-18 NOTE — PROGRESS NOTES
04/18/17 1100   Quick Adds   Type of Visit Initial PT Evaluation   Living Environment   Lives With alone   Living Arrangements independent living facility   Home Accessibility bed and bath on same level   Number of Stairs to Enter Home 0   Number of Stairs Within Home 0   Stair Railings at Home none   Transportation Available family or friend will provide;public transportation   Self-Care   Usual Activity Tolerance moderate   Current Activity Tolerance moderate   Regular Exercise (some walking)   Equipment Currently Used at Home bath bench;walker, standard   Functional Level Prior   Ambulation 1-->assistive equipment   Transferring 1-->assistive equipment   Toileting 1-->assistive equipment   Bathing 1-->assistive equipment   Dressing 0-->independent   Fall history within last six months yes   Number of times patient has fallen within last six months 3   General Information   Onset of Illness/Injury or Date of Surgery - Date 04/17/17   Referring Physician MD Nicole   Patient/Family Goals Statement Return home   Pertinent History of Current Problem (include personal factors and/or comorbidities that impact the POC) 88 year old female admitted for episdoe of weakness/syncope. PMH for aortic stenosis, Severe valvular disease, HTN.   Precautions/Limitations fall precautions   General Info Comments Activity orders: Ambulate with assist   Cognitive Status Examination   Orientation orientation to person, place and time   Level of Consciousness alert   Follows Commands and Answers Questions 100% of the time   Personal Safety and Judgment intact   Cognitive Comment Kaw   Range of Motion (ROM)   ROM Comment B LE WNL for functional mobility   Strength   Strength Comments B LE WNL for functional mobility   Bed Mobility   Bed Mobility Comments independent with supine to/from sit   Transfer Skills   Transfer Comments SBA with sit to/from stand x2   Gait   Gait Comments ambulate 225' with SBA and FWW. ambulated 10' to/from  "batroom with FWW and SBA.   Balance   Balance Comments no LOB with turns in ambulation   Sensory Examination   Sensory Perception Comments no reported numbness/tingling   Coordination   Coordination Comments able to don sock in sitting in cahir   Clinical Impression   Criteria for Skilled Therapeutic Intervention evaluation only   PT Diagnosis impaired gait   Influenced by the following impairments LE strength, activity tolerance   Functional limitations due to impairments gait and stairs   Clinical Presentation Stable/Uncomplicated   Clinical Presentation Rationale clinical judgement, PLOF   Clinical Decision Making (Complexity) Low complexity   Therapy Frequency` (eval only)   Predicted Duration of Therapy Intervention (days/wks) 1 day   Anticipated Discharge Disposition Home with Outpatient Therapy  (vs. Home with Home therapy (safety eval))   Risk & Benefits of therapy have been explained Yes   Patient, Family & other staff in agreement with plan of care Yes   Clinical Impression Comments Pt appropriate for continued skilled PT care following discharge from hospital.   [facility] Pianpian-PAC TM \"6 Clicks\"   2016, Trustees of [facility], under license to [employer].  All rights reserved.   6 Clicks Short Forms Basic Mobility Inpatient Short Form   [facility] AM-PAC  \"6 Clicks\" V.2 Basic Mobility Inpatient Short Form   1. Turning from your back to your side while in a flat bed without using bedrails? 4 - None   2. Moving from lying on your back to sitting on the side of a flat bed without using bedrails? 4 - None   3. Moving to and from a bed to a chair (including a wheelchair)? 3 - A Little   4. Standing up from a chair using your arms (e.g., wheelchair, or bedside chair)? 3 - A Little   5. To walk in hospital room? 3 - A Little   6. Climbing 3-5 steps with a railing? 3 - A Little   Basic Mobility Raw Score (Score out of 24.Lower scores equate to lower levels of function) 20   Total Evaluation " Time   Total Evaluation Time (Minutes) 12

## 2017-04-18 NOTE — PLAN OF CARE
Problem: Goal Outcome Summary  Goal: Goal Outcome Summary  Outcome: No Change  [id number]  Pt forgetful, impulsive.  No change in orthostatic BP's today.  BP slightly high this am, 150's.  EP consulted and thinks dizziness is most likely related to orthostatic hypotension.  PO Amlodipine started, PTA Metoprolol XL d/c'd.  ECHO ordered late today, has not been completed yet.  1-2 + BLE edema, pt refuses to wear home ace wraps today 2/2 pain.  Remains 100% VPaced.

## 2017-04-18 NOTE — PLAN OF CARE
Problem: Goal Outcome Summary  Goal: Goal Outcome Summary  Outcome: No Change  A&O, VSS, denies chest pains/SOB, 100% V paced, stable on RA, SBA with a walker for BRP. Plan for EP and OT consults today. Continue to monitor.

## 2017-04-18 NOTE — CONSULTS
REQUESTING PHYSICIAN:  Hospitalist Service.      REASON FOR CONSULTATION:  Atrial fibrillation.      HISTORY OF PRESENT ILLNESS:  Ms. Rebecca Helms is an 88-year-old white female with a history of sinus node dysfunction.  She had a pacemaker implanted on 09/12/2014 for symptomatic sinus node dysfunction.  Over the past few years she has been struggling with symptomatic paroxysmal atrial fibrillation.  After she failed rate control therapy with medications, she underwent AV node ablation on 04/07/2017.  She was discharged home without complications.  Yesterday she went to take the mail when she suddenly developed severe weakness with lightheadedness.  The ambulance was called and she was brought to the hospital for further evaluation.  She was found to be in paced rhythm with relatively high blood pressure.  Her troponin is normal.  Since admission, she has been relatively stable with no other events.  She is currently comfortable without complaints.      PAST MEDICAL HISTORY:  Moderate pulmonary hypertension, moderate aortic stenosis with a low gradient severe tricuspid regurgitation, severe osteoporosis, hypertension, history of carotid stenosis, chronic renal insufficiency, history of glaucoma and hyperlipidemia.      FAMILY HISTORY:  Noncontributory to atrial fibrillation.      SOCIAL HISTORY:  She is DNR and DNI.  She does not smoke or abuse alcohol.      REVIEW OF SYSTEMS:  Comprehensive review of systems showed no fever, cough or diarrhea.  She has no chest pain at the present time.      MEDICATIONS:   1.  Toprol-XL 75 mg p.o. daily.   2.  Eliquis 2.5 mg p.o. b.i.d.   3.  Lasix 40 mg p.o. daily.   4.  Lisinopril 20 mg p.o. daily.   5.  Pravachol 20 mg p.o. daily.      ALLERGIES:  Amiodarone.      PHYSICAL EXAMINATION:   GENERAL:  She is alert and oriented without acute distress.   VITAL SIGNS:  Blood pressure 132/86, heart rate 70 beats per minute and temperature 97.2 degrees, oxygen saturation 93% on room air.    HEENT:  Eyes and  ENT were unremarkable.   SKIN:  No skin  rash or lymph node enlargement.   NECK:  The jugular vein was not extended.   LUNGS:  Had no crackles or wheezing.   HEART:  Rhythm was regular and the heart sounds were normal.  She had a third-degree systolic murmur in the aortic valve area.   ABDOMEN:  Examination showed no hepatomegaly.   EXTREMITIES:  There was no pedal edema.   NEUROLOGIC:  Showed no focal deficit.      Her pacemaker interrogation showed evidence of paroxysmal atrial fibrillation.  She is in complete AV block from AV node ablation.  The pacemaker function is normal.      ASSESSMENT AND RECOMMENDATIONS:  Ms. Kemp is an 88-year-old white female who is status post AV node ablation.  She was readmitted for severe dizziness and weakness.  The pacemaker function is normal, and there is no other new cardiac event.  It is highly likely that the patient had an episode of severe orthostatic hypotension.  I have changed her Toprol-XL 75 mg p.o. daily to amlodipine 5 mg p.o. daily because of her complaint of being weak.  The dose of amlodipine may need to be titrated until we achieve reasonable control of her blood pressure.  Furthermore, the patient may need adjustment of Lasix dose.  I am not sure if she needs Lasix 40 mg on a regular basis.  If she does not have apparent fluid retention, it may be reasonable to consider hydrochlorothiazide 25 mg once a day.         JANET DAMIAN MD             D: 2017 09:27   T: 2017 09:46   MT: BALJEET      Name:     ANTHONY KEMP   MRN:      [id number]        Account:       [id number]   :      1928           Consult Date:  2017      Document: [id number]

## 2017-04-19 NOTE — PROVIDER NOTIFICATION
"Notified Person:  Hospitalist  Notified Persons Name:  Dr. Shaw  Notification Date/Time:  4/19/17; 1100  Notification Interaction:  Web paged  Purpose of Notification:  Discharge?  Orders Received:  Comments: text paged, \"EP says d/c time at discretion of the hospitalist service.  pt only has ride until 2pm.\"    "

## 2017-04-19 NOTE — DISCHARGE INSTRUCTIONS
Orthostatic Low Blood Pressure (Hypotension)  A blood pressure reading is made up of 2 numbers There is a top number over a bottom number. The top number is the systolic pressure. The bottom number is the diastolic pressure. A normal blood pressure is less than 120 over less than 80. Low blood pressure (hypotension) is a blood pressure that is less than what is normal for you.  Orthostatic hypotension is a type of low blood pressure that occurs only when you change position from lying to standing. It can cause dizziness, lightheadedness, or fainting.  Medicines can cause orthostatic hypotension. These include:    High blood pressure medicines    Water pills (diuretics)    Some heart medicines    Some antidepressants    Pain, anxiety, sedative, and sleeping medicines  Other causes include:    Dehydration from vomiting, diarrhea, or not getting enough fluids    Severe infection    High fever    Blood loss. This could be bleeding from the stomach or intestines.  Treatment will depend on what is causing your low blood pressure.  Home care  Follow these guidelines when caring for yourself at home:    Rest until your symptoms get better.    Change positions slowly from lying to standing. When getting out of bed, sit on the side of the bed with your legs down for at least 30 seconds before standing. This gives your body time to adjust to the position change.    Follow the treatment plan described by your health care provider.  Follow-up care  Follow up with your health care provider, or as advised.  When to seek medical advice  Call your health care provider right away if any of these occur:    Dizziness, lightheadedness, or fainting    Black or red color in your stools or vomit    Diarrhea or vomiting that doesn t go away    You aren t able to eat or drink    Fever of 100.4 F (38 C) or higher, or as directed by your health care provider    Burning when you urinate    Foul-smelling urine    [id number] [facility]  [employer]. [address] [place] [address], [place] [address]. All rights reserved. This information is not intended as a substitute for professional medical care. Always follow your healthcare professional's instructions.      Patient will discharge to home with a resumption of homecare services for nursing in addition to PT/OT.  [facility] will contact patient directly to arrange for the first visit.  [facility]'s phone number is [phone].

## 2017-04-19 NOTE — PROGRESS NOTES
Care Transition Initial Assessment -   Reason For Consult: discharge planning  Met with: Patient  Active Problems:    Syncope         DATA  Lives With: alone  Living Arrangements: apartment  Description of Support System: Supportive, Involved  Who is your support system?: Other (specify) (negin, dtr who lives out of town)  Support Assessment: Adequate family and caregiver support.  Identified issues/concerns regarding health management:   Patient feels that they have adequate support @ home?  Yes     Resources List: Home Care    Transportation Available: family or friend will provide, public transportation    Per social service protocol for discharge planning.  Patient was admitted on 4-17-17 after a syncopal episode.  The tentative date of discharge is 4-19-17.  Patient is admitted under observation status.  Reviewed chart.  Patient lives alone in a senior apartment at [address] [place] [address].  Patient uses a forward wheeled walker and furniture walks at baseline.  Patient has a bath bench in the bathroom.  Patient is open to [facility] for nursing.  Patient is planning on returning home upon discharge with a resumption of homecare for nursing in addition to PT/OT.  Email referral sent to [facility] and process explained.  Patient informed of the plan and in agreement to the plan.  Received a call from patient's daughter Lainey asking for an update.  She is looking at possibly adding private pay homecare.    ASSESSMENT  Cognitive Status:  awake and alert  Concerns to be addressed: discharge planning.     PLAN  Financial costs for the patient includes N/A.  Patient given options and choices for discharge resumption of homecare.  Patient/family is agreeable to the plan?  Yes:  Patient Goals and Preferences: resumption of homecare.  Patient anticipates discharging to:  Resumption of homecare.    No further needs anticipated.    JENNIFER Salomon, [facility]  [phone]

## 2017-04-19 NOTE — PROGRESS NOTES
Cardiology Progress Note          Assessment and Plan:   Sinus node dysfunction, pacer implanted on 9/12/2014.   Symptomatic paroxysmal atrial fib with RVR, sp AVN ablation on 4/7/2017.  Admitted for severe weakness and lightheadedness.  Pacemaker interrogated, normal function.  History of  pulmonary hypertension,  AS with low gradient,  tricuspid regurgitation, severe osteoporosis, hypertension, carotid stenosis and chronic renal insufficiency.  Repeat echo: normal EF, moderate AS, moderate TR, moderate MR.     Likely had an episode of orthostatic hypotension.  Changed metoprolol 75 mg to amlodipine 5 mg po daily on 4/18. Systolic BP is 149 this am. Will increase amlodipine to 10 mg po daily.  Discharge time at discretion of the hospitalist service.  Keep previous cardiology follow up plan.  Sign off  Marissa aWrren MD  Cardiology   [phone]                Diagnosis:     Patient Active Problem List   Diagnosis     iamLUMBAGO     Lumbar radiculopathy     Back strain     Syncope and collapse     CAD (coronary artery disease)     Sinus node dysfunction (H)     Carotid stenosis     Severe tricuspid regurgitation     Pulmonary hypertension (H)     Atrial fibrillation (H)     Pacemaker     Spinal stenosis     HTN (hypertension)     Dyslipidemia     Chronic diastolic CHF (congestive heart failure) (H)     Carotid bruit     Glaucoma     Pain of left lower extremity     Syncope                Physical Exam:   Blood pressure 149/79, pulse 90, temperature 98  F (36.7  C), temperature source Oral, resp. rate 17, weight 47.7 kg (105 lb 2.6 oz), SpO2 96 %.  Wt Readings from Last 4 Encounters:   04/19/17 47.7 kg (105 lb 2.6 oz)   04/14/17 51.4 kg (113 lb 4.8 oz)   04/08/17 52.5 kg (115 lb 12.8 oz)   03/20/17 54.2 kg (119 lb 6.4 oz)      I/O last 3 completed shifts:  In: 1133 [P.O.:1130; I.V.:3]  Out: 4040 [Urine:4040]    Constitutional: Alert, no apparent distress,    Lungs: No crackles or wheezing,    Cardiovascular: Regular  rate and rhythm, normal S1 and S2, and no murmur,    Lymphm node  Neck  ENT  Neurologic  Abdomen: No enlargement  No jugular vein extension or carotid bruit  No apparent abnormality  No focal deficit  Normal bowel sounds, soft, no distension, no tender   Skin: No rashes, no cyanosis   Extremity: No edema          Medications:     Current Facility-Administered Medications:      [START ON 4/20/2017] amLODIPine (NORVASC) tablet 10 mg, 10 mg, Oral, Daily, Marissa Warren MD     sodium chloride (PF) 0.9% PF flush 3 mL, 3 mL, Intracatheter, Q1H PRN, Roseanne Rivera MD     lidocaine 1 % 1 mL, 1 mL, Other, Q1H PRN, Roseanne Rivera MD     lidocaine (LMX4) cream, , Topical, Q1H PRN, Roseanne Rivera MD     sodium chloride (PF) 0.9% PF flush 3 mL, 3 mL, Intracatheter, Q1H PRN, Roseanne Rivera MD     nitroglycerin (NITROSTAT) sublingual tablet 0.4 mg, 0.4 mg, Sublingual, Q5 Min PRN, Roseanne Rivera MD     ondansetron (ZOFRAN-ODT) ODT tab 4 mg, 4 mg, Oral, Q6H PRN **OR** ondansetron (ZOFRAN) injection 4 mg, 4 mg, Intravenous, Q6H PRN, Roseanne Rivera MD     acetaminophen (TYLENOL) tablet 650 mg, 650 mg, Oral, Q4H PRN, Roseanne Rivera MD, 650 mg at 04/19/17 0606     acetaminophen (TYLENOL) Suppository 650 mg, 650 mg, Rectal, Q4H PRN, Roseanne Rivera MD     apixaban ANTICOAGULANT (ELIQUIS) tablet 2.5 mg, 2.5 mg, Oral, BID, Roseanne Rivera MD, 2.5 mg at 04/19/17 0838     brimonidine (ALPHAGAN) 0.2 % ophthalmic solution 1 drop, 1 drop, Both Eyes, BID, Roseanne Rivera MD, 1 drop at 04/19/17 0936     furosemide (LASIX) tablet 40 mg, 40 mg, Oral, Daily, Roseanne Rivera MD, 40 mg at 04/18/17 1115     latanoprost (XALATAN) 0.005 % ophthalmic solution 1 drop, 1 drop, Both Eyes, At Bedtime, Roseanne Rivera MD, 1 drop at 04/18/17 9142     timolol (TIMOPTIC) 0.5 % ophthalmic solution 1 drop, 1 drop, Both Eyes, BID, Roseanne Rivera MD, 1 drop at 04/19/17 6715      pravastatin (PRAVACHOL) tablet 20 mg, 20 mg, Oral, Daily, Roseanne Rivera MD, 20 mg at 04/18/17 2156     lisinopril (PRINIVIL/ZESTRIL) tablet 20 mg, 20 mg, Oral, Daily, Roseanne Rivera MD, 20 mg at 04/19/17 0838           Lab results:        Recent Labs   Lab Test  04/18/17 0335 04/17/17 2030 04/1935   NA  137  138  Canceled, Test credited   Unsatisfactory specimen - clotted  NEW ORDER AND SPECIMEN REQUESTED.     POTASSIUM  4.5  4.4  Canceled, Test credited   Unsatisfactory specimen - clotted  NEW ORDER AND SPECIMEN REQUESTED.     CHLORIDE  104  103  Canceled, Test credited   Unsatisfactory specimen - clotted  NEW ORDER AND SPECIMEN REQUESTED.     CARLO  9.0  9.0  Canceled, Test credited   Unsatisfactory specimen - clotted  NEW ORDER AND SPECIMEN REQUESTED.     CO2  24  24  Canceled, Test credited   Unsatisfactory specimen - clotted  NEW ORDER AND SPECIMEN REQUESTED.     BUN  27  29  Canceled, Test credited   Unsatisfactory specimen - clotted  NEW ORDER AND SPECIMEN REQUESTED.     CR  1.15*  1.23*  Canceled, Test credited   Unsatisfactory specimen - clotted  NEW ORDER AND SPECIMEN REQUESTED.     GLC  86  99  Canceled, Test credited   Unsatisfactory specimen - clotted  NEW ORDER AND SPECIMEN REQUESTED.       Recent Labs   Lab Test  04/17/17 2030 04/1935 04/07/17   1110   HGB  13.8  Canceled, Test credited   Unsatisfactory specimen - clotted  NEW ORDER AND SPECIMEN REQUESTED.    13.1   WBC  11.6*  Canceled, Test credited   Unsatisfactory specimen - clotted  NEW ORDER AND SPECIMEN REQUESTED.    8.0   PLT  210  Canceled, Test credited   Unsatisfactory specimen - clotted  NEW ORDER AND SPECIMEN REQUESTED.    184     Recent Labs   Lab Test  04/07/17   1110  12/08/16   1830  08/06/11   0700   INR  1.38*  1.17*  1.08     Recent Labs   Lab Test  04/18/17   1018  04/18/17 0335 04/17/17 2030   TROPI  0.035  0.032  0.027

## 2017-04-19 NOTE — PROGRESS NOTES
Discharge hand-off completed to patient's PCP, Dr Gipson and f/u appt scheduled.  [facility] as well [facility]/OT added.

## 2017-04-19 NOTE — PLAN OF CARE
Problem: Goal Outcome Summary  Goal: Goal Outcome Summary  Outcome: No Change  VSS. Pt is forgetful. Pt up with 1 & walker. Pt denies SOB. Pt c/o of back pain, PRN tylenol was given and pain was decreased. Tele shows 100% V-paced. O2 sats 92% on RA. Will continue to monitor.

## 2017-04-19 NOTE — PLAN OF CARE
Problem: Goal Outcome Summary  Goal: Goal Outcome Summary  Outcome: No Change  VSS. Pt. Denies pain. Monitor remains V.paced/AV paced rhythm. Pt. Declines ace wraps to legs. Echo completed. Daughter updated.

## 2017-04-19 NOTE — DISCHARGE SUMMARY
[facility]  Discharge Summary        Rebecca Helms MRN# [id number]   YOB: 1928 Age: 88 year old     Date of Admission:  4/17/2017  Date of Discharge:  4/19/2017  Admitting Physician:  Roseanne Rivera MD  Discharge Physician: Nolan Shaw MD  Discharging Service: Hospitalist     Primary Provider: Ren Isaac  Primary Care Physician Phone Number: [phone]         Discharge Diagnoses:        Syncope, unspecified syncope type  Physical deconditioning        Problem Oriented Hospital Course (Providers):    Rebecca Helms was admitted on 4/17/2017 by Roseanne Rivera MD and I would refer you to their history and physical.  The following problems were addressed during her hospitalization:  Syncope with weakness: Unclear etiology. Monitored. Checked for orthostatic. Echo and EP consulted. Lasix was on hold. PPM interrogated and found to be normal. Cardiology consulted. Toprol XL discontinued and change to amlodipine 5 mg daily per cardio. No sign of volume overload.     ECHO:  The visual ejection fraction is estimated at 60-65%.  There is a catheter/pacemaker lead seen in the right ventricle.  There is moderate biatrial enlargement.  There is moderate (2+) tricuspid regurgitation.  Right ventricular systolic pressure is elevated, consistent with severe  pulmonary hypertension.  The transmitral spectral Doppler flow pattern is suggestive of  pseudonormalization.  There is moderate (2+) mitral regurgitation.  Moderate valvular aortic stenosis.  No major changes compared with recent studies.    Patient was seen prior to discharge and found to be stable. She was advised to follow with her PCP.          Code Status:      DNR / DNI        Brief Hospital Stay Summary Sent Home With Patient in AVS:        Reason for your hospital stay       Syncopal episode                        Important Results:      See below.        Pending Results:        Unresulted Labs  Ordered in the Past 30 Days of this Admission     No orders found from 2/16/2017 to 4/18/2017.            Discharge Instructions and Follow-Up:      Follow-up Appointments     Follow-up and recommended labs and tests        Follow up with primary care provider, Ren Gipson, within 7   days for hospital follow- up.  No follow up labs or test are needed.    Follow up scheduled for Wednesday, April 26th at 10am with Dr Gipson at   [facility]                      Discharge Disposition:      Discharged to home        Discharge Medications:        Discharge Medication List as of 4/19/2017 12:11 PM      START taking these medications    Details   amLODIPine (NORVASC) 10 MG tablet Take 1 tablet (10 mg) by mouth daily, Disp-30 tablet, R-0, E-Prescribe         CONTINUE these medications which have CHANGED    Details   lisinopril (PRINIVIL/ZESTRIL) 20 MG tablet Take 1 tablet (20 mg) by mouth daily, Disp-30 tablet, R-0, E-Prescribe         CONTINUE these medications which have NOT CHANGED    Details   acetaminophen (TYLENOL ARTHRITIS PAIN) 650 MG CR tablet Take 1,300 mg by mouth 2 times daily , Historical      moxifloxacin (VIGAMOX) 0.5 % ophthalmic solution Place 1 drop Into the left eye daily as needed (eye infections) , Historical      apixaban ANTICOAGULANT (ELIQUIS) 2.5 MG tablet Take 2.5 mg by mouth 2 times daily , Disp-90 tablet, R-1, Historical DO NOT FILL.  XARELTO IS DISCONTINUED AND PT WILL CONTINUE WITH ELIQUIS. SHE HAS A 60 DAY SUPPLY AT HOME (ELIQUIS) THIS IS FOR DOCUMENTATION PURPOSES ONLY. SUELANGENBRUNNERRN      brimonidine (ALPHAGAN-P) 0.15 % ophthalmic solution Place 1 drop into both eyes 2 times daily , Historical      pravastatin (PRAVACHOL) 20 MG tablet Take 1 tablet (20 mg) by mouth daily, Disp-30 tablet, R-11, E-Prescribe      calcium-vitamin D (CALTRATE) 600-400 MG-UNIT per tablet Take 1 tablet by mouth daily , Historical      latanoprost (XALATAN) 0.005 % ophthalmic  solution Place 1 drop into both eyes At Bedtime, Historical      timolol (TIMOPTIC) 0.5 % ophthalmic solution Place 1 drop into both eyes 2 times daily, Historical      TRAMADOL HCL PO Take 50 mg by mouth At Bedtime Patient takes scheduled, Historical      multivitamin, therapeutic with minerals (MULTI-VITAMIN) TABS Take 1 tablet by mouth daily., Historical      order for DME Equipment being ordered: Walker Wheels () and Walker ()  Treatment Diagnosis: difficulty with gaitDisp-1 each, R-0, Local Print         STOP taking these medications       furosemide (LASIX) 40 MG tablet Comments:   Reason for Stopping:         metoprolol (TOPROL-XL) 25 MG 24 hr tablet Comments:   Reason for Stopping:                 Allergies:         Allergies   Allergen Reactions     Amiodarone Visual Disturbance     Decreased visual acuity             Consultations This Hospital Stay:      Consultation during this admission received from cardiology        Condition and Physical on Discharge:      Discharge condition: Stable   Vitals: Heart Rate: 73, Blood pressure 111/60, pulse 90, temperature 98  F (36.7  C), temperature source Oral, resp. rate 17, weight 47.7 kg (105 lb 2.6 oz), SpO2 96 %.     Constitutional: Awake, alert. No apparent distress   Lungs: Clear to auscultation bilaterally, no crackles or wheezing   Cardiovascular: Regular HR, normal S1 and S2, and no murmur noted   Abdomen: Normal bowel sounds, soft, non-distended, non-tender   Skin: No rashes, no cyanosis.   Other: LE:                  Discharge Time:      Greater than 30 minutes.        Image Results From This Hospital Stay (For Non-EPIC Providers):        Results for orders placed or performed during the hospital encounter of 04/17/17   XR Chest 2 Views    Narrative    CHEST TWO VIEWS  4/17/2017 7:16 PM     HISTORY: Chest pain.    COMPARISON: 1/20/2017.      Impression    IMPRESSION: Cardiomegaly and pacemaker are unchanged. Right pleural  effusion is slightly  decreased. Previous left pleural effusion has  resolved. No interstitial edema.     YOKO REYES MD           Most Recent Lab Results In EPIC (For Non-EPIC Providers):    Most Recent 3 CBC's:  Recent Labs   Lab Test  04/17/17 2030 04/1935 04/07/17   1110   WBC  11.6*  Canceled, Test credited   Unsatisfactory specimen - clotted  NEW ORDER AND SPECIMEN REQUESTED.    8.0   HGB  13.8  Canceled, Test credited   Unsatisfactory specimen - clotted  NEW ORDER AND SPECIMEN REQUESTED.    13.1   MCV  99  Canceled, Test credited   Unsatisfactory specimen - clotted  NEW ORDER AND SPECIMEN REQUESTED.    98   PLT  210  Canceled, Test credited   Unsatisfactory specimen - clotted  NEW ORDER AND SPECIMEN REQUESTED.    184      Most Recent 3 BMP's:  Recent Labs   Lab Test  04/18/17   0335 04/17/17 2030 04/1935   NA  137  138  Canceled, Test credited   Unsatisfactory specimen - clotted  NEW ORDER AND SPECIMEN REQUESTED.     POTASSIUM  4.5  4.4  Canceled, Test credited   Unsatisfactory specimen - clotted  NEW ORDER AND SPECIMEN REQUESTED.     CHLORIDE  104  103  Canceled, Test credited   Unsatisfactory specimen - clotted  NEW ORDER AND SPECIMEN REQUESTED.     CO2  24  24  Canceled, Test credited   Unsatisfactory specimen - clotted  NEW ORDER AND SPECIMEN REQUESTED.     BUN  27  29  Canceled, Test credited   Unsatisfactory specimen - clotted  NEW ORDER AND SPECIMEN REQUESTED.     CR  1.15*  1.23*  Canceled, Test credited   Unsatisfactory specimen - clotted  NEW ORDER AND SPECIMEN REQUESTED.     ANIONGAP  9  11  Canceled, Test credited   Unsatisfactory specimen - clotted  NEW ORDER AND SPECIMEN REQUESTED.     CARLO  9.0  9.0  Canceled, Test credited   Unsatisfactory specimen - clotted  NEW ORDER AND SPECIMEN REQUESTED.     GLC  86  99  Canceled, Test credited   Unsatisfactory specimen - clotted  NEW ORDER AND SPECIMEN REQUESTED.       Most Recent 3 Troponin's:  Recent Labs   Lab Test  04/18/17   1018  04/18/17    0335  04/17/17   2030   TROPI  0.035  0.032  0.027     Most Recent 3 INR's:  Recent Labs   Lab Test  04/07/17   1110  12/08/16   1830  08/06/11   0700   INR  1.38*  1.17*  1.08     Most Recent 2 LFT's:  Recent Labs   Lab Test  04/18/17   0335  02/15/17   1435   AST  36  46*   ALT  35  39   ALKPHOS  101  102   BILITOTAL  0.9  0.9     Most Recent Cholesterol Panel:No lab results found.  Most Recent 6 Bacteria Isolates From Any Culture (See EPIC Reports for Culture Details):  Recent Labs   Lab Test  08/28/14   1730   CULT  10,000 to 50,000 colonies/mL mixed urogenital kaye     Most Recent TSH, T4 and HgbA1c:   Recent Labs   Lab Test  12/09/16   0055   TSH  0.61            Teresa Shaw MD  Internal medicine Hospitalist:   Pager [phone]    4/19/2017

## 2017-04-20 NOTE — PROGRESS NOTES
Called patient and left VM to discuss any post hospital d/c questions she may have, review medication changes, and confirm f/u appts. RN advised patient in  that she has an apt scheduled on 5/1/17 with NATACHA Maday Diaz(11:30 labs and 12:30 with NATACHA). RN advised patient that she was initially scheduled for f/u on 5/17/17, but due to the fact that she was hospitalized we would like to see her sooner in clinic. Both apt times for 5/1/17 and 5/17/17 are held until patient returns call to confirm which one she will be able to attend. Patient advised in  to continue calling in to our telassurance program to record weight. Patient also advised in  to call [facility] with any questions or concerns (number for [facility] provided in VM).

## 2017-04-20 NOTE — PROGRESS NOTES
"TELEMANAGEMENT: Wt 108#, down 3# from last call in and below min wt parameters with no symptoms reported. Reviewed EPIC, pt admitted 4/17-4/19 for syncope/weakness. Thought related to hypotension. Per notes:   \"Syncope with weakness: Unclear etiology. Monitored. Checked for orthostatic. Echo and EP consulted. Lasix was on hold. PPM interrogated and found to be normal. Cardiology consulted. Toprol XL discontinued and change to amlodipine 5 mg daily per cardio. No sign of volume overload\".     Prior to admission, pt taking Lasix 40mg daily, and holding Lasix for wt <112#. Pt had been holding Lasix for quite a while prior to admission, as wt was below 112#. Per DC summary, Lasix stopped all together. Pt scheduled to see Farheen on 5/17. Scheduled pt to see Farheen sooner on 5/1. Called pt, she is feeling better. SOB is improved. Dizziness gone. Reviewed appt with for 5/1, she wrote down information. Pt states she is scheduled to see Dr. Gipson, PMD, next week. Will route update to Farheen. ZULLY Stafford    "

## 2017-04-20 NOTE — PROGRESS NOTES
Called pt, [place] home care nurse Mendy at pt's home. Spoke with her, she just checked BP and is 89/56. Pt denies dizziness right now. Reviewed with Farheen. Pt to hold Amlodipine. Continue Lisinopril at 20mg daily for now. Continue with current plan for holding Lasix for wt <114#, and take 20mg or 1/2 tablet of Lasix (pt has 40mg tablet- verified this with Mendy) for wt 114# or more. Reviewed all of this with Mendy, she wrote it down, read it back and stated understanding. She will see pt again next week Monday or Tuesday, asked for to call with update then. She agreed. Will continue to monitor. ZULLY Stafford

## 2017-04-20 NOTE — PROGRESS NOTES
Unfortunate that she had these episodes as she was doing well in clinic.  Will have to let her bp run higher.  Pls have her hold lasix for <114 pounds, for 114 and higher just 20 mg daily.  Please be sure she is eating well.  No change to parameters at this time.  Maday Diaz PA-C 4/20/2017 1:56 PM

## 2017-04-21 PROBLEM — J96.00 ACUTE RESPIRATORY FAILURE (H): Status: ACTIVE | Noted: 2017-01-01

## 2017-04-21 NOTE — LETTER
4/30/2017.    RE : Lainey Su.    To whom it may concern,    Please note that Lainey has a family member who has been hospitalized at [facility], [place], [place], from 4/21 to ongoing, and that Lainey has been involved in the care of the patient and her presence is of paramount importance.    Lainey unfortunately can't travel due to the unfortunate and unexpected predicament of her family member, and I hope you can accommodate her travels when deemed suitable.    Thank you for your consideration,    Miles NUNEZ.

## 2017-04-21 NOTE — PROGRESS NOTES
[facility] Consult done while pt at [facility] due to weakness and lightheadedness. Medications were adj. [employer] PPM checked and normal function noted. 29% AP and 100% . In afib 51% of the time. She was AV node ablated on 4/7/17. She will f/u as scheduled. She is on eliquis. CHAPIS

## 2017-04-22 NOTE — PHARMACY-VANCOMYCIN DOSING SERVICE
Pharmacy Vancomycin Initial Note  Date of Service 2017  Patient's  1928  88 year old, female    Indication: [facility] Pneumonia    Current estimated CrCl = Estimated Creatinine Clearance: 24.4 mL/min (based on Cr of 1.34).    Creatinine for last 3 days  2017:  7:28 PM Creatinine Canceled, Test credited   Unsatisfactory specimen - hemolyzed   Notification of test cancellation was given to  ER via trackboard at 2020.SL   mg/dL;  8:35 PM Creatinine 1.34 mg/dL    Recent Vancomycin Level(s) for last 3 days  No results found for requested labs within last 72 hours.      Vancomycin IV Administrations (past 72 hours)                   vancomycin (VANCOCIN) 1000 mg in dextrose 5% 200 mL PREMIX (mg) 1,000 mg New Bag 17                Nephrotoxins and other renal medications (Future)    Start     Dose/Rate Route Frequency Ordered Stop    17 2100  vancomycin 1250 mg in 0.9% NaCl 250 mL PREMIX      1,250 mg Intravenous EVERY 48 HOURS 17 2348      17 0900  lisinopril (PRINIVIL/ZESTRIL) tablet 20 mg      20 mg Oral DAILY 17 0200  piperacillin-tazobactam (ZOSYN) 2.25 g vial to attach to  ml bag     Comments:  Pharmacy can adjust dose based on renal function.    2.25 g  over 30 Minutes Intravenous EVERY 6 HOURS 17 2319  furosemide (LASIX) tablet 20 mg      20 mg Oral DAILY PRN 17 2319            Contrast Orders - past 72 hours (72h ago through future)    Start     Dose/Rate Route Frequency Ordered Stop    17 193  iopamidol (ISOVUE-370) solution 53 mL      53 mL Intravenous ONCE 17 19317                Plan:  1.  Start vancomycin  1250 mg IV q48h (2nd dose to be scheduled early to achieve earlier steady state levels).   2.  Goal Trough Level: 15-20 mg/L   3.  Pharmacy will check trough levels as appropriate in 1-3 Days.    4. Serum creatinine levels will be ordered daily for the first  week of therapy and at least twice weekly for subsequent weeks.    5. [place] method utilized to dose vancomycin therapy: Method 1    Fran Douglas

## 2017-04-22 NOTE — PHARMACY-ADMISSION MEDICATION HISTORY
Admission medication history interview status for the 4/21/2017  admission is complete. See EPIC admission navigator for prior to admission medications     Medication history source reliability:Good    Actions taken by pharmacist (provider contacted, etc):Verified all medications with patient's discharge summary on 4/19/2017     Additional medication history information not noted on PTA med list :None    Medication reconciliation/reorder completed by provider prior to medication history? No    Time spent in this activity: 20 minutes    Prior to Admission medications    Medication Sig Last Dose Taking? Auth Provider   Furosemide (LASIX PO) Take 20 mg by mouth daily as needed (If weight great than 114 pounds)  prn med Yes Unknown, Entered By History   amLODIPine (NORVASC) 10 MG tablet Take 1 tablet (10 mg) by mouth daily **ON HOLD AS OF 4/20/17 per [facility]** 4/20/2017 at Unknown time Yes More, Maday Mckenzie PA-C   lisinopril (PRINIVIL/ZESTRIL) 20 MG tablet Take 1 tablet (20 mg) by mouth daily 4/21/2017 at am Yes Nolan Shaw MD   acetaminophen (TYLENOL ARTHRITIS PAIN) 650 MG CR tablet Take 1,300 mg by mouth 2 times daily  4/21/2017 at am x 1 dose Yes Unknown, Entered By History   moxifloxacin (VIGAMOX) 0.5 % ophthalmic solution Place 1 drop Into the left eye daily as needed (eye infections)  prn med Yes Unknown, Entered By History   apixaban ANTICOAGULANT (ELIQUIS) 2.5 MG tablet Take 2.5 mg by mouth 2 times daily  4/21/2017 at am x 1 dose Yes Marissa Warren MD   brimonidine (ALPHAGAN-P) 0.15 % ophthalmic solution Place 1 drop into both eyes 2 times daily  4/21/2017 at am x 1 dose Yes Reported, Patient   pravastatin (PRAVACHOL) 20 MG tablet Take 1 tablet (20 mg) by mouth daily  Patient taking differently: Take 20 mg by mouth every evening  4/20/2017 at pm Yes Praful Monae MD   calcium-vitamin D (CALTRATE) 600-400 MG-UNIT per tablet Take 1 tablet by mouth daily  4/21/2017 at am Yes Unknown,  Entered By History   latanoprost (XALATAN) 0.005 % ophthalmic solution Place 1 drop into both eyes At Bedtime 4/20/2017 at hs Yes Unknown, Entered By History   timolol (TIMOPTIC) 0.5 % ophthalmic solution Place 1 drop into both eyes 2 times daily 4/21/2017 at am x 1 dose Yes Unknown, Entered By History   TRAMADOL HCL PO Take 50 mg by mouth At Bedtime Patient takes scheduled 4/20/2017 at hs Yes Unknown, Entered By History   multivitamin, therapeutic with minerals (MULTI-VITAMIN) TABS Take 1 tablet by mouth daily. 4/21/2017 at am Yes Reported, Patient   order for DME Equipment being ordered: Walker Wheels () and Walker ()  Treatment Diagnosis: difficulty with gait   Sumanth Rincon MD

## 2017-04-22 NOTE — ED PROVIDER NOTES
History     Chief Complaint:    Shortness of breath     HPI   History significantly limited due to acute respiratory distress.    Rebecca Helms is a 88 year old female with a history of hypertension, diastolic CHF, multi valvular disease, pulmonary hypertension, severe aortic stenosis, a fib on Eliquis with pacemaker for sick sinus syndrome, who presents via EMS with shortness of breath. She was recent admitted 4/17-4/19 for syncope. Echocardiogram showed EF of 60-65%. Lasix and Metoprolol were discontinued at discharge.    The patient felt fine earlier today but had sudden onset of shortness of breath while getting out of the elevator at her apartment after dinner. Her friend called EMS within minutes of onset. Her O2 saturation was 70-75% on scene but arianne to the mid 80s on CPAP. BP was 188/100 and blood glucose 121. She was speaking in short sentences and complaining of difficulty breathing. On arrival she denies any chest pain.     Allergies:  Amiodarone      Medications:    Lasix  Norvasc   Lisinopril  Eliquis  Tramadol  Pravastatin    Past Medical History:    CHF  CAD  Severe tricuspid regurgitation  Atrial fibrillation    Carotid stenosis  Chronic renal insufficiency   Osteoporosis   Pulmonary hypertension  Sinus node dysfunction  Syncope     Past Surgical History:    D & C  Tonsillectomy  Bilateral cataracts  Ovarian cystectomy   Implant pacemaker     Family History:    MI - father  Heart surgery - father     Social History:  Marital Status:   Presents to the ED via EMS  Patient lives in an apartment alone.  Tobacco Use: No  Alcohol Use: Yes   PCP: Ren Gipson     Review of Systems   Respiratory: Positive for shortness of breath.    Cardiovascular: Negative for chest pain.   All other systems reviewed and are negative.      Physical Exam     Patient Vitals for the past 24 hrs:   BP Pulse Resp SpO2 Weight   04/21/17 1923 (!) 182/92 95 26 91 % 53.3 kg (117 lb 8 oz)     Patient Vitals for  "the past 24 hrs:   BP Temp Temp src Pulse Heart Rate Resp SpO2 Height Weight   04/21/17 2330 174/82 - - - 82 22 - - -   04/21/17 2300 179/83 - - - 76 22 95 % 1.6 m (5' 3\") -   04/21/17 2245 152/70 - - - 76 16 94 % - -   04/21/17 2230 138/71 - - - 73 18 95 % - -   04/21/17 2215 143/74 - - - 71 18 96 % - -   04/21/17 2200 125/73 - - - 76 16 94 % - -   04/21/17 2151 - - - - 73 21 94 % - -   04/21/17 2150 131/60 - - - - - - - -   04/21/17 2140 139/71 - - - 74 19 96 % - -   04/21/17 2130 122/66 - - - 76 19 92 % - -   04/21/17 2120 117/53 - - - 77 21 93 % - -   04/21/17 2112 - - - - 73 22 93 % - -   04/21/17 2110 121/58 - - - 72 21 (!) 89 % - -   04/21/17 2100 117/69 - - - 75 21 (!) 89 % - -   04/21/17 2055 121/63 - - - 76 23 (!) 88 % - -   04/21/17 2050 115/62 - - - 79 24 90 % - -   04/21/17 2049 - - - - 78 22 91 % - -   04/21/17 2046 122/71 - - - 77 22 91 % - -   04/21/17 2040 136/82 - - - - - - - -   04/21/17 2039 - - - - 78 25 91 % - -   04/21/17 2034 151/88 - - - 82 25 92 % - -   04/21/17 2030 (!) 147/95 - - - 82 27 93 % - -   04/21/17 2020 (!) 177/102 - - - 84 23 93 % - -   04/21/17 2016 - - - - 84 23 94 % - -   04/21/17 2015 (!) 172/97 - - - - - - - -   04/21/17 2010 (!) 189/105 - - - - - - - -   04/21/17 2009 - - - - 83 24 92 % - -   04/21/17 2005 (!) 194/118 - - - 85 23 91 % - -   04/21/17 1958 (!) 218/95 - - - 87 27 (!) 85 % - -   04/21/17 1933 - - - - 84 (!) 35 92 % - -   04/21/17 1931 (!) 190/101 - - - 91 (!) 38 (!) 85 % - -   04/21/17 1927 - - - - 94 28 92 % - -   04/21/17 1923 (!) 182/92 97.7  F (36.5  C) Temporal 95 - 26 91 % - 53.3 kg (117 lb 8 oz)   04/21/17 1922 - - - - 92 28 (!) 77 % - -            Physical Exam    Physical Exam   Constitutional:  Lethargic, frail appearing woman on CPAP.   HENT:   Mouth/Throat:   Oropharynx is clear. Dry mucous membranes.    Eyes:    Conjunctivae normal and EOM are normal. Pupils are 2 mm equal, round, and reactive to light.   Neck:    Normal range of motion. "   Cardiovascular: Normal rate, regular rhythm.Exam reveals no gallop and no friction rub.  Very soft heart sounds with murmur.  Pulmonary/Chest:  Rhonchorous. Diminished. Tachypneic. Acute respiratory distress. Speaks in one word sentences.   Abdominal:   Soft. Bowel sounds are normal. Patient exhibits no mass. There is no tenderness. There is no rebound and no guarding.   Musculoskeletal:  Normal range of motion. Moderate bilateral pedal edema.   Neurological:   Patient is alert and oriented to person. Patient has normal strength. No cranial nerve deficit or sensory deficit. GCS 13  Skin:   Skin is warm and dry. No erythema. Scattered ecchymosis in the upper extremities likely secondary to previous IV sticks and blood draws.   Psychiatric:   Unable to assess       Emergency Department Course   ECG:  @ 1919  Indication: shortness of breath   Vent. Rate 93 bpm. ID interval 196 ms. QRS duration 154 ms. QT/QTc 388/482 ms. P-R-T axis 68 143 21.   Atrial-sensed ventricular paced rhythm.  Abnormal ECG.    Read @ 1920 by Dr. Joya.     Imaging:  CT Chest Pulmonary Embolism w Contrast  Preliminary Result  IMPRESSION:   1. No pulmonary embolism demonstrated.  2. No thoracic aortic aneurysm.   3. Extensive bilateral pneumonia.    XR Chest Port 1 View  Preliminary Result  IMPRESSION: Increasing hazy densities bilaterally, may be infection  and/or edema. Trace pleural fluid on the right appears similar.    Laboratory:  ISTAT gases venous: Ph 7.23 (L), PCO2 62 (H), PO2 49 (H), bicarb 26, O2 sat 76, lactic acid 3.4 (H)   CBC:  WBC 17.6 (H), HGB 16.7 (H),   CMP: glucose 152 (H), BUN 32 (H), creatinine 1.34 (H), GFR 37 (L), Ca 7.9 (L), albumin 2.9 (L), protein total 6.3 (L), otherwise WNL  INR: 1.16 (H)   PTT: 29  BNP: 1990 (H)   Troponin I: 0.075 (H)   Procalcitonin: 0.13   Blood culture x2: pending     Emergency Department Course:  (1913) The patient arrived in the emergency department via EMS in Stabilization Room 1.    Nursing notes and vitals reviewed.  I performed an exam of the patient as documented above.    (1913) RT present. EMS CPAP removed and patient was placed on BiPAP. 2 peripheral IV's established  (1919) EKG was obtained.   (1923) Nitro IV gtt 0.14 mcg/kg/min started.  (1932) Portable chest xray obtained. I left the room for the first time.  (1932) I reviewed the chest xray.   Patient was sent for CT.  (1936) Nitro drip increased to 0.2 mcg/kg/min.   (0653-9310) The patient was rechecked. She had a small amount of pink frothy sputum. Nitro was turned up to 1.5 mcg/kg/min. I spoke with Lainey, her daughter who lives out of state, to update her on her moms status.  (2007) Normal Saline 1000 mL IV   (2010) Zosyn 3.375 g IV  (2034) I discussed the patient with Dr. Rincon of the hospitalist service, who will admit the patient to a [facility] bed for further monitoring, evaluation, and treatment. Patient mentating and speaking much better.  (2117) Vancomycin 1000 mg in dextrose IV      Impression & Plan      CMS Diagnoses:   The patient has signs of Severe Sepsis as evidenced by:    1. 2 SIRS criteria, AND  2. Suspected infection, AND   3. Organ dysfunction: Lactic Acid >2    Time severe sepsis diagnosis confirmed = 1951 as this was the time when Lactate resulted, and the level was >2      3 Hour Severe Sepsis Bundle Completion:  1. Initial Lactic Acid Result:   Recent Labs   Lab Test  04/21/17   1943  12/09/16   0916  12/08/16   1844   LACT  3.4*  2.0  3.5*     2. Blood Cultures before Antibiotics: Yes  3. Broad Spectrum Antibiotics Administered: Yes      Anti-infectives (Future)    Start     Dose/Rate Route Frequency Ordered Stop    04/21/17 2000  piperacillin-tazobactam (ZOSYN) 3.375 g vial to attach to  mL bag      3.375 g  over 1 Hours Intravenous ONCE 04/21/17 1959 04/21/17 2000  vancomycin (VANCOCIN) 1000 mg in dextrose 5% 200 mL PREMIX      1,000 mg Intravenous ONCE 04/21/17 1959          4. 1000 ml of IV  fluids.    the patient was transferred out of the ED prior to the 6 hour alonso.     Medical Decision Making:  Rebecca Helms is a 88 year old female who had sudden onset of shortness of breath after dinner tonight when she was riding the elevator up to her residence. Her daughter talked with her earlier tonight and she was fine. Her friend called 911 due to her acute shortness of breath. She was a very poor historian on EMS arrival as she could not breathe very well. EMS placed her on CPAP and transferred to the ED code red. On arrival here, the patient was transferred to BiPAP. She was very tachypneic. It was noted that she is DNR/DNI. Peripheral IV was accessed, two large bores. She was extremely hypertensive and with the story, flash pulmonary edema was the most likely scenario so a Nitro drip was started for pulmonary hypertension and lowering of the blood pressure. Chest xray was performed immediately as well which showed likely bilateral pulmonary edema with questionable infiltrate on the left side. Patient was sent to CT due to her recent hospitalization and acute shortness of breath. There is no evidence of PE or dissection but it was read as bilateral pneumonia. Clinically I believe that flash pulmonary edema is still the more likely scenario. Perhaps there is a component of pneumonia on top of it as well. I did dose her with Zosyn and Vancomycin for hospital acquired pneumonia due to her recent hospitalization however I did not feel a bolus of IV fluids at 30 cc/kg was appropriate for this patient due to her severe aortic stenosis, severe pulmonary hypertension, and multivalvular disease with what appeared to be bilateral pulmonary edema. She was gently hydrated due to her fragile respiratory state and fluid over load.     On the Nitro drip, her blood pressure did eventually come down to 120 systolic at which point the Nitro drip was shut off. She did maintain her blood pressure. Her heart rate has always  been controlled. The remainder of her blood work shows an elevated BNP, troponin which is also elevated but this is most likely a leak secondary to acute respiratory distress. Procalcitonin was added on after admitting the patient. This did come back at low risk of bacterial infection however at that time sepsis protocol had already been triggered. Blood cultures are pending. Her lactic acid again is elevated in the setting of a leukocytosis and a CT read of pneumonia. I feel she has a component of both CHF, flash pulmonary edema, as well as hospital acquired edema. I will admit to [facility].     Critical Care time:  was 60 minutes for this patient excluding procedures.    Diagnosis:    ICD-10-CM   1. Pneumonia of both lungs due to infectious organism, unspecified part of lung J18.9   2. Severe sepsis (H) A41.9    R65.20   3. Congestive heart failure, unspecified congestive heart failure chronicity, unspecified congestive heart failure type (H) I50.9   4. Acute respiratory distress (H) R06.00       Disposition:  Admit.     Laura Ramírez  4/21/2017    EMERGENCY DEPARTMENT    I, Laura Ramírez, am serving as a scribe on 4/21/2017 at 7:16 PM to personally document services performed by Dr. Joya based on my observations and the provider's statements to me.         Veronica Joya MD  04/22/17 0126

## 2017-04-22 NOTE — PLAN OF CARE
Problem: Individualization  Goal: Patient Preferences  A&OX3, Prn Tylenol and heat pack given for c/o back pain with good relief. Vss, weaned from 100% BiPAP to high flow O2 62% FIO2 at this time.  Up with assist of one to bedside commode and noted dyspnea with exertion. IV lasix 20mg x1 given. con't to monitor.

## 2017-04-22 NOTE — ED NOTES
Pt had 3 rings on her fingers, those were left on. Pt also had on 2 alyson and gold pendents on her ears, a alyson and gold necklace and a gold watch, those were taken off and placed in pt belongings bag with her clothes.

## 2017-04-22 NOTE — CONSULTS
CARDIOLOGY CONSULTATION       DATE OF CONSULTATION:  04/22/2017 - 10 a.m.      REQUESTING PHYSICIAN:  Dr. Amaro.      CHIEF COMPLAINT:  Probable flash pulmonary edema versus pneumonia; sudden shortness of breath and oxygen desaturation.      HISTORY OF PRESENT ILLNESS:  Ms. Rebecca Helms is an 88-year-old female with a significant cardiac history who presents with an acute respiratory decompensation after dinner last night.  She was recently in the hospital for fatigue and an unexplained syncopal episode.  She has a significant cardiac history including multivalvular disease with at least moderate AS, MR and TR.  She also has atrial fibrillation status post AV leslie ablation, sinus node dysfunction, status post permanent pacemaker and known chronic HFpEF.      I reviewed the notes and was able to speak with the patient some, although she is still currently on BiPAP.  She is clear and can communicate well, but the device obviously causes some communication barrier.  She was recently in the hospital a few days ago with a syncopal episode that was ultimately thought to be due to orthostatic hypotension.  She does have a pacemaker and there were apparently no high rate episodes seen.  The device is functioning appropriately and therefore no bradycardia.  She then went home and was feeling well.  I spoke with her daughter on the phone.  Her daughter tells me that she seemed to be doing quite well yesterday during the day.  After dinner, however, the patient suddenly became very short of breath.  Her friend called EMS and she was brought into the ED.      The pertinent findings in the ED included a chest x-ray with bilateral infiltrates that were increased from 04/17.  The patient underwent a CT scan which did not show any PE or aortic dissection.  It did show significant bilateral infiltrates.  This was called pneumonia on the CT. The patient had an ECG which shows no ischemic changes, although that is in the setting of  V-pacing and therefore fairly insensitive.  Her labs were notable for an NT-proBNP that is actually not very elevated and much lower than it was previously.  Her troponin was slightly elevated.  It seems to have been this way chronically but was even higher than before. Her second troponin today has gone up to 0.14 from the initial of 0.07.  Her procalcitonin was checked initially and was not in the elevated range.  It is now just slightly into the range that could be consistent with infection.  Her lactate was elevated on admission but is currently okay.  Creatinine was a bit up at 1.3 initially, but this morning is down a little lower at 1.1.  CBC is notable for very elevated white count at 17.6 initially and now up to 26.      The patient denies any recent fevers.  She says she has had a cough for about a month.  Otherwise, does not note any new symptoms.      REVIEW OF SYSTEMS:  Pertinent systems reviewed and negative except as discussed in the HPI.      PAST MEDICAL HISTORY:  Moderate AS, moderate TR, moderate MR, HFpEF, sinus node dysfunction, permanent atrial fibrillation status post AV leslie ablation, status post pacemaker placement.      SOCIAL HISTORY:  The patient lives in a senior living apartment.      PHYSICAL EXAMINATION:   VITAL SIGNS:  Temperature 96.5, pulse 89, blood pressure 137/89, sats 94% on 75% oxygen by BiPAP.   GENERAL:  Somewhat labored breathing on BiPAP.  No acute distress.   HEART:  Regular rate and rhythm, 3/6 mid peaking systolic murmur at the upper precordium, preserved A2.  Different systolic murmur heard at the apex.   LUNGS:  Coarse; BiPAP sounds present.   EXTREMITIES:  1+ peripheral edema bilaterally.   MENTAL:  Alert and oriented, answers questions appropriately.      ASSESSMENT AND PLAN:   1.  Acute respiratory decompensation, likely secondary to flash pulmonary edema, possible pneumonia component.   2.  Elevated troponin, likely in the setting of cardiac stress from  respiratory decompensation.   3.  Multi-valvular disease with at least moderate aortic stenosis, mitral regurgitation and tricuspid regurgitation.   4.  Atrial fibrillation, status post AV node ablation.   5.  Sinus node dysfunction, status post pacemaker placement.      Ms. Helms had an acute respiratory decompensation.  She was noted to have very high blood pressures in the ED.  This seemed to be quite sudden.  She certainly has the underlying cardiac conditions that would make flash pulmonary edema possible and did have high blood pressures.  Her medications were also changed recently and she may have had a dietary component to this.  I think it is appropriate to treat for the flash pulmonary edema/acute decompensated heart failure.  I agree with the gentle diuresis as initiated by Dr. Amaro.  Intravenous Lasix at 20 mg b.i.d. seems reasonable for now.  It is difficult to know her volume status and how diuresis may affect her hemodynamics in the setting of her multivalvular disease but diuresis certainly seems appropriate in this situation.      The patient's troponin is slightly elevated and has increased.  I think this is likely secondary to the stress of the other conditions.  I do not think that this is primarily an ischemic issue.  Therefore, I do not think an angiogram would be appropriate at this point.  Even if it were, however, I discussed this with the patient and her daughter, and she would not want to move forward with something as invasive as an angiogram.      The patient does have multiple valve problems and these may contribute to her cardiac decompensation.  She is certainly not a surgical candidate.  She also does not seem to have severe aortic stenosis and therefore TAVR would not be helpful.  Percutaneous interventions for the other disease also likely would not be of benefit.      Ultimately, she does have known HFpEF and significant hypertension.  We should focus on improving her volume  status.  We will also need to make sure that we establish good blood pressure control as this may have been a major contributor.  She is on lisinopril.  Metoprolol was recently stopped due to concerns for orthostatic hypotension and of course it is not an excellent blood pressure medication anyway.  She has been started on amlodipine which is fine in the setting of HFpEF.  She was previously on 5 mg.  Dr. Amaro had increased to 10 mg, which I think is a good option.  We should see if that is enough blood pressure control.  If not, we can implement other medications.      The patient does have some lab abnormalities that are concerning for potential infection including a very elevated white blood cell count and the procalcitonin is now in the possible infection range.  I will defer to Dr. Amaro and the primary team as far as antibiotics.  I think it is reasonable to continue for now.  Clinically, the acute decompensation does not seem to go along with that, although a slowly worsening underlying infection may go along with her decompensation over the last week or so.      The patient is on apixaban and it is reasonable to continue that in the setting of her atrial fibrillation.  I have no other recommendations at this point.  I will continue to follow along. Thank you for the consultation.         TAYLOR FERRER MD             D: 2017 10:13   T: 2017 11:03   MT: SHALOM      Name:     ANTHONY KEMP   MRN:      [id number]        Account:       [id number]   :      1928           Consult Date:  2017      Document: [id number]       cc: Toshia Amaro DO

## 2017-04-22 NOTE — H&P
PRIMARY CARE PHYSICIAN:  Ren Gipson MD      CHIEF COMPLAINT:  Shortness of breath, acute, after going out to eat dinner.      HISTORY OF PRESENT ILLNESS:  Rebecca Helms is a frail 88-year-old  female who was admitted to [facility] from 04/18/2017-04/19/2017 with symptoms of weakness, lightheadedness and episode of syncope.  The patient has a complicated medical history including history of severe valvular disease including moderate to severe aortic stenosis, moderate to severe mitral regurgitation and moderate to severe tricuspid regurgitation.  Patient has got a history of sick sinus syndrome resulting in pacemaker placement as a history of paroxysmal atrial fibrillation with intermittent tachycardia on Eliquis, but she had prior AV node ablation.  She has got osteoporosis as well.  During her 2-day stay, the patient had an echo and a pacemaker interrogation.  Her Lasix was apparently discontinued and her Toprol was discontinued and changed to amlodipine according to the discharge summary.  The patient was discharged and apparently was doing okay until this evening.      The patient went out to eat dinner with her friends and ended up eating shrimp, had some sort of soup and rice.  On her way into her apartment, going up the elevators, she became acutely short of breath.  Her friends called EMS.  Her oxygen saturation in the low 70s at the scene but arianne to the mid 80s on CPAP.  She was severely hypertensive, blood pressure 188/100.  She was only able to speak in short sentences.  The patient was brought to the stabilization room where she was seen by Dr. Veronica Joya.  In the Emergency Department, she was noted to be afebrile, blood pressures reach as high as 218/95.  The patient was placed on BiPAP.  Lab work reviewed including normal electrolytes, BUN 32, creatinine 1.34.  BNP is 1900.  Troponin is mildly elevated at 0.075, glucose 152.  INR is 1.16.  She had initial portable chest  x-ray which showed increased hazy densities bilaterally, maybe infection and/or edema.  Trace pleural effusion on the right appears similar.  She further underwent a CT of the chest.  This showed no evidence of pulmonary embolism, no thoracic aortic aneurysm but extensive bilateral pneumonia is what was called.  There is extensive ground-glass infiltrates throughout all lungs.  Consolidative changes seen in both lower lobes, left worse than right and minimal right pleural effusion and patchy consolidated infiltrates seen at the remainder of the lungs.  The heart is not enlarged.  The patient was also placed on nitroglycerin with improvement of her blood pressure.  She is also mentating better after being on BiPAP.  She was treated for possible pneumonia with Zosyn and vancomycin given her recent hospitalization.  The patient is going to be admitted to the intermediate care unit.  She is a DNR/DNI.      PAST MEDICAL HISTORY:   1.  Severe valvular disease including moderate to severe 3+ mitral regurgitation, moderate to severe 3-4+ tricuspid regurgitation, moderate to severe aortic stenosis.   2.  Hypertension.   3.  Dyslipidemia.   4.  Paroxysmal atrial fibrillation with history of AV node ablation.  She is on Eliquis.   5.  Sick sinus syndrome, status post pacemaker placement.   6.  Diastolic heart failure with EF of about 55%-60% in 02/2017.  Most recent echo from 04/2017 showed EF that is preserved at 60% -65% with moderate biatrial enlargement and moderate TR.  Right ventricular systolic pressure consistent with severe pulmonary hypertension, moderate valvular aortic stenosis and moderate mitral regurgitation.     7.  Carotid artery stenosis.      PAST SURGICAL HISTORY:     1.  Tonsillectomy.   2.  D&C.   3.  Cystectomy.   4.  Bilateral eye surgery for cataracts.   5.  Status post pacemaker placement.   6.  AV node ablation earlier in the month.      SOCIAL HISTORY:  [place]  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  There is no tobacco, rare alcohol.      FAMILY HISTORY:  Reviewed and noncontributory.      ALLERGIES:  No known drug allergies.      CURRENT MEDICATIONS:   1.  Tylenol 650 mg twice a day.   2.  Amlodipine 10 mg once a day.   3.  Apixaban 2.5 mg twice daily.   4.  Alphagan 0.15% one drop both eyes twice daily.   5.  Calcium with vitamin D 1 tablet once a day.   6.  Xalatan 0.005% one drop both eyes at bedtime.   7.  Lisinopril 20 mg once a day.   8.  Moxifloxacin 0.5% ophthalmic solution 1 drop left eye daily.   9.  Multivitamin 1 tablet once a day.   10.  Pravachol 20 mg daily.   11.  Timolol 0.5% ophthalmic solution 1 drop both eyes daily.   12.  Tramadol 50 mg at bedtime.   11.  Lasix 20 mg once a day.      REVIEW OF SYSTEMS:  Ten point review of systems reviewed but unable to really get a full response because the patient on BiPAP.  She currently denies any chest pain or any fevers or chills or sweats or abdominal pain.  Denies any headache.      PHYSICAL EXAMINATION:   VITAL SIGNS:  Temperature 97.7, heart rate 95, respirations 19, blood pressure 122/66, sats 92% on room air.   GENERAL:  The patient is a frail 88-year-old  female.  She is on BiPAP.   HEENT:  Pupils equal.  Sclerae anicteric.  Mucous membranes are dry.   NECK:  Veins are full.   LUNGS:  She has got bilateral rhonchi.   CARDIOVASCULAR:  S1, S2.  She is paced.   ABDOMEN:  Soft.   EXTREMITIES:  She has got +1 edema of her right lower extremity, +2 of her left lower extremity.  Feet are cool to touch.  She has got a venous stasis weakness.  She is able to move all 4 extremities.  Cranial nerves appear grossly intact.      LABORATORY DATA:  As dictated in history of present illness.      ASSESSMENT:  Rebecca Helms is an 88-year-old female with history of severe valvular disease, diastolic dysfunction, admitted after going out to eat, suspected acute respiratory failure due to flash pulmonary edema due to dietary indiscretion in the  setting of severe valvular disease being admitted to [facility] for further respiratory stabilization.  The patient's chest x-ray also shows some consolidative changes ground-glass findings as well as patchy infiltrates.  This could be also possible pneumonia and so patient will be treated for pneumonia as well.      PLAN:   1.  Acute hypoxic respiratory failure.  Patient is DNR/DNI.  Patient will be on BiPAP.  She will go to the intermediate care unit.  We are going to treat her with vancomycin and Zosyn.  We will check a procalcitonin level but overall, the patient's acute presentation is more suggestive of acute flash pulmonary edema.  The patient does have leukocytosis but this can certainly also happen from pulmonary edema as well.   2.  Acute exacerbation of congestive heart failure.  The patient's classic presentations is that of flash pulmonary edema.  The patient was on nitro and is now breathing better.  The nitro has been weaned off.  The patient was, I believe, taken off Lasix on her last admission but I see it is back on her medication list, so I am not clear whether she is on it or off of it, but we will start the patient back on Lasix.  We will obtain a formal cardiology consultation as well.  Patient will likely need more dietary instruction about what she can eat and what she cannot.   3.  Paroxysmal atrial fibrillation.  We will continue patient on Eliquis.  She is on a ventricular paced rhythm.   4.  History of hypertension.  Will continue the patient on lisinopril and her Toprol dose was discontinued.  She was on amlodipine.  Currently, her blood pressure is stable.   5.  Dyslipidemia.  We will continue patient on Pravachol.   6.  Stage III chronic kidney disease.  The patient's kidney function is slightly worse at 1.3, then usually 1.1-1.2 which is her baseline.  Her BNP is also not as high as it was previously as well.  This argues more towards possible pneumonia.   7.  Mild troponin elevation.   Likely due to acute respiratory failure.  Will trend her troponins.   8.  DNR/DNI.         JAVIER VIVEROS MD             D: 2017 21:50   T: 2017 22:50   MT: [id number]      Name:     ANTHONY KEMP   MRN:      [id number]        Account:      [id number]   :      1928           Admitted:     [id number]      Document: [id number]       cc: Ren Gipson MD

## 2017-04-22 NOTE — PLAN OF CARE
Problem: Goal Outcome Summary  Goal: Goal Outcome Summary  Outcome: No Change  Pt BP elevated during the night- resolved without intervention. Pt remains on BIPAP at 100% with O2 sats at 94%. Pt denies pain. Plan: continue to monitor and cardiology consult.

## 2017-04-22 NOTE — PLAN OF CARE
Problem: Goal Outcome Summary  Goal: Goal Outcome Summary  Outcome: No Change  Pt Arrived to CCU 23:00. Pt rhythm paced, BP elevated. Tavia too the little baggie of jewelry- including pt watch, earrings and necklace home. Pt still has 3 rings on and bilateral hearing aids. Plan: continue BIPAP and cardiology consult in the morning.

## 2017-04-22 NOTE — PROGRESS NOTES
[facility]    Hospitalist Progress Note    Assessment & Plan   Rebecca Helms is a 88 year old female with complex PMHx including severe valvular disease (moderate MR, moderate TR and mod-severe AS), hypertension, dyslipidemia, paroxsymal afib with hx of AVN ablation on chronic anticoagulation, sick sinus syndrome s/p ppm, chronic diastolic CHF and carotid artery stenosis who was admitted on 4/21/2017 with acute onset shortness of breath suspected secondary to flash pulmonary edema. She was initially requiring BiPAP and was admitted to the [facility].    Note she was recently hospitalized here from 4/17/17 - 4/19/17 for evaluation of syncope. Workup included a repeat echocardiogram with findings as summarized above. Her medications were adjusted (Toprol XL dc'd and started on amlodipine 5mg daily). She had no s/sx of fluid overload during that stay.     Acute Hypoxic Respiratory Failure dt flash pulmonary edema in the setting of severe valvular disease:  Acute Exacerbation of Chronic Diastolic CHF:   Echocardiogram on 4/17 showed EF 60-65%, 2+MR, 2+TR and mod AS as well as elevated RVSP c/w severe pulmonary hypertension.  She developed acute onset after she went out to dinner with friends and became acutely short of breath after returning home. Typically not O2 dependent. O2 sats were as low as 70s when EMS arrived. In ED, CXR showed some hazy densities bilaterally. CT chest was neg for PE, showed extensive ground glass infiltrate throughout the lungs and some consolidative changes in the lower lobes (L worse than R). ProBNP 1900. Trop 0.075. WBC 17.6. Procalcitonin 0.13 (low risk of systemic bacterial infection). Lactate 3.4. Placed on BIPAP in the ED. Also started on Vancomycin and Zosyn given recent hospitalization.  -- acute onset of respiratory symptoms more suggestive of flash pulmonary edema than pneumonia, no reported cough/fever  -- Vanco and Zosyn continued on admission, leukocytosis increased  this morning but could also be reactive, remains afebrile, will repeat procalcitonin this morning, keep low threshold to dc antibiotics  -- was initially placed on nitro gtt but this has since been weaned off  -- will start diuresis with Lasix 20mg IV BID for   -- wean off BiPAP as able  -- cardiology consulted, assistance greatly appreciated    Elevated troponin:  Suspect demand ischemia in the setting of acute hypoxic respiratory failure.   -- serial troponins  -- hold off on repeating echocardiogram as she just had one on 4/17    Paroxysmal Afib with hx of AV Node Ablation  Sick Sinus Syndrome s/p ppm:  Remains paced.   Anticoagulated with Eliquis, this has been continued  Toprol XL dc'd during recent hospital stay, no issues with rate control thus far    Hypertension:  Meds adjusted during recent hospital stay -> Toprol XL dc'd and was started on amlodipine 10mg daily though I see this was on hold per [facility] as of 4/20  -- will cont lisinopril 20mg daily, amlodipine was also continued on admission  -- BPs elevated overnight, improved w/o specific intervetion    Dyslipidemia:  Chronic and stable on statin. Can resume once off BiPAP.    Stage III CKD:  Baseline Cr seems to be 1.1-1.2.   -- monitor renal function while diuresing    Glaucoma:  Cont home eye gtts.    FEN: no IVFs, lytes stable, NPO while on BiPAP  DVT Prophylaxis: anticoagulated on Eliquis  Code Status: DNR/DNI    Disposition: Keep on [facility] status today as she remains on BiPAP. Will likely be hospitalized for several more days. Will eventually need therapy consults.     Toshia Amaro    Interval History   Tolerated BiPAP overnight. Resting comfortably this morning. Easily rouses. Denies complaints at present.     -Data reviewed today: I reviewed all new labs and imaging results over the last 24 hours. I personally reviewed no images or EKG's today.    Physical Exam   Temp: 96.5  F (35.8  C) Temp src: Axillary BP: 137/89 Pulse: 95 Heart  Rate: 89 Resp: 23 SpO2: 95 % O2 Device: BiPAP/CPAP Oxygen Delivery: 15 LPM  Vitals:    04/21/17 1923 04/22/17 0100   Weight: 53.3 kg (117 lb 8 oz) 51.4 kg (113 lb 5.1 oz)     Vital Signs with Ranges  Temp:  [96.5  F (35.8  C)-97.7  F (36.5  C)] 96.5  F (35.8  C)  Pulse:  [95] 95  Heart Rate:  [71-94] 89  Resp:  [16-38] 23  BP: (115-218)/() 137/89  FiO2 (%):  [100 %] 100 %  SpO2:  [77 %-97 %] 95 %  I/O last 3 completed shifts:  In: 418.7 [I.V.:418.7]  Out: 600 [Urine:600]    Constitutional: Resting comfortably, alert and answering questions appropriately, NAD  Respiratory: diffuse crackles throughout all fields, no wheeze/rhonchi  Cardiovascular: HRRR w/++SM throughout precordium  GI: S, NT, ND, +BS  Skin/Integumen: warm/dry  Other:  +bilateral LE edema to mid shins    Medications     nitroglycerin Stopped (04/21/17 2053)     - MEDICATION INSTRUCTIONS -       - MEDICATION INSTRUCTIONS -         piperacillin-tazobactam  3.375 g Intravenous Q6H     sodium chloride (PF)  3 mL Intracatheter Q8H     traMADol (ULTRAM) tablet 50 mg  50 mg Oral At Bedtime     timolol  1 drop Both Eyes BID     pravastatin  20 mg Oral QPM     multivitamin, therapeutic with minerals  1 tablet Oral Daily     lisinopril  20 mg Oral Daily     latanoprost  1 drop Both Eyes At Bedtime     calcium carb 1250 mg (500 mg [place])/vitamin D 200 units  1 tablet Oral Daily     brimonidine  1 drop Both Eyes BID     apixaban ANTICOAGULANT  2.5 mg Oral BID     amLODIPine  10 mg Oral Daily     acetaminophen  1,300 mg Oral BID     vancomycin (VANCOCIN) IV  1,250 mg Intravenous Q48H       Data     Recent Labs  Lab 04/22/17  0526 04/21/17 2035 04/21/17 1928 04/18/17  1018  04/17/17 2030   WBC 26.1*  --  17.6*  --   --  11.6*   HGB 13.6  --  16.7*  --   --  13.8   MCV 98  --  100  --   --  99     --  246  --   --  210   INR  --  1.16* Canceled, Test credited Unsatisfactory specimen - hemolyzed Notification of test cancellation was given toER via  trackboard at 2020.SL  --   --   --     139 Canceled, Test credited Unsatisfactory specimen - hemolyzed Notification of test cancellation was given toER via trackboard at 2020.SL  --   < > 138   POTASSIUM 4.1 4.0 Canceled, Test credited Unsatisfactory specimen - hemolyzed Notification of test cancellation was given toER via trackboard at 2020.SL  --   < > 4.4   CHLORIDE 107 107 Canceled, Test credited Unsatisfactory specimen - hemolyzed Notification of test cancellation was given toER via trackboard at 2020.SL  --   < > 103   CO2 23 23 Canceled, Test credited Unsatisfactory specimen - hemolyzed Notification of test cancellation was given toER via trackboard at 2020.SL  --   < > 24   BUN 37* 32* Canceled, Test credited Unsatisfactory specimen - hemolyzed Notification of test cancellation was given toER via trackboard at 2020.SL  --   < > 29   CR 1.07* 1.34* Canceled, Test credited Unsatisfactory specimen - hemolyzed Notification of test cancellation was given toER via trackboard at 2020.SL  --   < > 1.23*   ANIONGAP 10 9 Canceled, Test credited Unsatisfactory specimen - hemolyzed Notification of test cancellation was given toER via trackboard at 2020.SL  --   < > 11   CARLO 8.7 7.9* Canceled, Test credited Unsatisfactory specimen - hemolyzed Notification of test cancellation was given toER via trackboard at 2020.SL  --   < > 9.0   * 152* Canceled, Test credited Unsatisfactory specimen - hemolyzed Notification of test cancellation was given toER via trackboard at 2020.SL  --   < > 99   ALBUMIN  --  2.9* Canceled, Test credited Unsatisfactory specimen - hemolyzed Notification of test cancellation was given toER via trackboard at 2020.SL  --   < >  --    PROTTOTAL  --  6.3* Canceled, Test credited Unsatisfactory specimen - hemolyzed Notification of test cancellation was given toER via trackboard at 2020.SL  --   < >  --    BILITOTAL  --  0.7 Canceled, Test credited Unsatisfactory specimen - hemolyzed  Notification of test cancellation was given toER via trackboard at 2020.SL  --   < >  --    ALKPHOS  --  121 Canceled, Test credited Unsatisfactory specimen - hemolyzed Notification of test cancellation was given toER via trackboard at 2020.SL  --   < >  --    ALT  --  34 Canceled, Test credited Unsatisfactory specimen - hemolyzed Notification of test cancellation was given toER via trackboard at 2020.SL  --   < >  --    AST  --  45 Canceled, Test credited Unsatisfactory specimen - hemolyzed Notification of test cancellation was given toER via trackboard at 2020.SL  --   < >  --    TROPI  --  0.075* Canceled, Test credited Unsatisfactory specimen - hemolyzed Notification of test cancellation was given toER via trackboard at 2020.SL 0.035  < > 0.027   < > = values in this interval not displayed.    Recent Results (from the past 24 hour(s))   XR Chest Port 1 View    Narrative    CHEST ONE VIEW UPRIGHT 4/21/2017 7:39 PM     HISTORY: Shortness of breath.      COMPARISON: 4/17/2017.      Impression    IMPRESSION: Increasing hazy densities bilaterally, may be infection  and/or edema. Trace pleural fluid on the right appears similar.    MANJIT CONSTANTINO MD   CT Chest Pulmonary Embolism w Contrast    Narrative    CT CHEST AND PULMONARY EMBOLISM ANGIOGRAM  4/21/2017 8:01 PM     HISTORY: Dyspnea.    COMPARISON: None.    TECHNIQUE: Volumetric helical acquisition of CT images of the chest  from the lung apices to the kidneys were acquired after the  administration of 53mL Isovue-370  IV contrast. Radiation dose for  this scan was reduced using automated exposure control, adjustment of  the mA and/or kV according to patient size, or iterative  reconstruction technique.    FINDINGS: There is no pulmonary embolism. Extensive groundglass  infiltrates throughout all lungs. Consolidative changes seen in both  lower lobes left worse than right. Minimal right pleural effusion.  Patchy consolidative infiltrates seen in the remainder of the  lungs.  The heart is not enlarged. There are moderate coronary vascular  calcifications consistent with coronary artery disease. Thoracic aorta  is atherosclerotic without evidence of aneurysm. Contrast bolus is  limiting for evaluation of dissection. No gross dissection  demonstrated. There is no pleural or pericardial effusion.  There is  no pneumothorax. Adrenal glands are normal. Remainder of the  visualized upper abdomen is unremarkable.      Impression    IMPRESSION:   1. No pulmonary embolism demonstrated.  2. No thoracic aortic aneurysm.   3. Extensive bilateral pneumonia.    MANJIT CONSTANTINO MD

## 2017-04-22 NOTE — ED NOTES
Bed: [facility]  Expected date: 4/21/17  Expected time: 7:04 PM  Means of arrival: Ambulance  Comments:  taya 2 88f SOB CPAP

## 2017-04-22 NOTE — CONSULTS
FULL DICTATED CONSULT TO FOLLOW    89 yo F w flash pulmonary edema vs. Pneumonia. Clinically seems more likely to be the former although some labs suggesting infection. Treating for both currently. Agree with current mgmt of Dr. Amaro. No changes recommended at this point.

## 2017-04-22 NOTE — ED NOTES
[facility]  ED Nurse Handoff Report    ED Chief complaint: Shortness of Breath (at home this evening at home, sats mid 70s on RA)      ED Diagnosis:   Final diagnoses:   None       Code Status: DNR / DNI    Allergies:   Allergies   Allergen Reactions     Amiodarone Visual Disturbance     Decreased visual acuity         Activity level - Baseline/Home:  Independent    Activity Level - Current:   Total Care     Needed?: No    Isolation: No  Infection: Not Applicable    Bariatric?: No    Vital Signs:   Vitals:    04/21/17 1958 04/21/17 2005 04/21/17 2009 04/21/17 2010   BP: (!) 218/95 (!) 194/118  (!) 189/105   Pulse:       Resp: 27 23 24    Temp:       TempSrc:       SpO2: (!) 85% 91% 92%    Weight:           Cardiac Rhythm: ,        Pain level:      Is this patient confused?: No    Patient Report: Initial Complaint: sob  Focused Assessment: pt put on bipap 10/5 100%, sats mid 70s RA at home, crackles ant lobes, pt alert and oriented, felt sudden sob at home this eveing, nitro started to help with pulm HTN  Tests Performed: EKG, CXR, chest CT, labs, BC  Abnormal Results: lac acid 3.4  Treatments provided: bipap, nitro gtt, zosyn     Family Comments: daughter Lainey called    OBS brochure/video discussed/provided to patient: N/A    ED Medications:   Medications   nitroglycerin 50 mg in D5W 250 mL (adult std) infusion (1.5 mcg/kg/min × 53.3 kg Intravenous Rate/Dose Change 4/21/17 2013)   piperacillin-tazobactam (ZOSYN) 3.375 g vial to attach to  mL bag (3.375 g Intravenous New Bag 4/21/17 2010)   vancomycin (VANCOCIN) 1000 mg in dextrose 5% 200 mL PREMIX (not administered)   sodium chloride 0.9 % for CT scan flush dose 81 mL (81 mLs Intravenous Given 4/21/17 1953)   iopamidol (ISOVUE-370) solution 53 mL (53 mLs Intravenous Given 4/21/17 1953)   0.9% sodium chloride BOLUS (1,000 mLs Intravenous New Bag 4/21/17 2007)       Drips infusing?:  Yes      ED NURSE PHONE NUMBER: [phone]

## 2017-04-22 NOTE — PROVIDER NOTIFICATION
MD Notification    Notified Person:  MD    Notified Persons Name: Dr. Wilson     Notification Date/Time: 04/22/17    Notification Interaction:  Talked with Physician    Purpose of Notification: Pt 's/80    Orders Received: PRN labatolol    Comments:

## 2017-04-22 NOTE — PROVIDER NOTIFICATION
MD Notification    Notified Person:  MD    Notified Persons Name:  Dr. Radha Amaro      Notification Date/Time:  1730, 4/22    Notification Interaction:  Talked with Physician    Purpose of Notification:  Pt with course crackles, coughing up yesenia blood, moderate amount    Orders Received:  Give lasix and monitor closely     Comments:

## 2017-04-23 NOTE — PLAN OF CARE
Problem: Goal Outcome Summary  Goal: Goal Outcome Summary  Outcome: Improving  Pt on Bipap most of day, now resting on HFNC at Fio2 of 100% and comfortable, ntg gtt started for chest pain, lasix increased, up to bedside commode, adequate u/o, plan to try and wean o2, family updated on status, continue to monitor closely.

## 2017-04-23 NOTE — PROVIDER NOTIFICATION
MD Notification    Notified Person:  MD    Notified Persons Name:  Dr. Mike, cards     Notification Date/Time: 1430, 4/23    Notification Interaction:  Talked with Physician    Purpose of Notification: pt c/o chest discomfort    Orders Received:  ntg gtt to start, will complete and continue to monitor    Comments:

## 2017-04-23 NOTE — PLAN OF CARE
Problem: Acute Respiratory Distress Syndrome (Adult)  Goal: Signs and Symptoms of Listed Potential Problems Will be Absent or Manageable (Acute Respiratory Distress Syndrome)  Signs and symptoms of listed potential problems will be absent or manageable by discharge/transition of care (reference Acute Respiratory Distress Syndrome (Adult) CPG).   Outcome: Improving  Patient tolerating Hi-Flow Nasal oxygenations and saturating between 92-96%.  Patient feels better breathing with this than the BPAP machine.  Patient up to BSC to void hourly--25cc to 200 cc per time.  Lungs with rales in bases. Patient complains of right hip pain.  Patient appears to making improvements, but patient doesn't feel she'll live past [place] this year.  Other VSS.

## 2017-04-23 NOTE — PLAN OF CARE
Problem: Goal Outcome Summary  Goal: Goal Outcome Summary  Outcome: No Change  Pt remains in HFNC now at 100% FiO2, at 30 liters, no c/o pain, had episode of coughing up a moderate amount of yesenia red blood, MD aware, extra dose of lasix given x1, up with 1 and BSC, continue to monitor closely.

## 2017-04-23 NOTE — PROGRESS NOTES
[facility]    Cardiology Progress Note    Date of Service (when I saw the patient): 04/23/2017    Assessment & Plan   Rebecca Helms is a 88 year old female who was admitted on 4/21/2017 with acute respiratory decompensation.    #1 Acute respiratory failure, probably from flash pulmonary edema  #2 Possible pneumonia  #3 Multi-valvular disease with at least moderate aortic stenosis, mitral regurgitation and tricuspid regurgitation  #4 HFpEF with acute decompensation  #5 Atrial fibrillation, status post AV node ablation  #6 Sinus node dysfunction, status post pacemaker placement    -Had yesenia hemoptysis last night per nurses report; will hold apixaban for now  -Respiratory status improved some but now back on BiPAP  -Kidney function stable; will try to increase diuresis; Lasix 40 mg IV BID  -Continuing antibiotics; discussed with Dr. Amaro  -BP well controlled; continue amlodipine and lisinopril    DVT Prophylaxis: Defer to primary service  Code Status: DNR/DNI    Kvng Mike MD      Interval History   Respiratory status improved temporarily yesterday but now back on BiPAP. Seems a little better today overall. Did have hemoptysis last night. Pink frothy sputum today. Hb dropped some. Cr stable. Procalcitonin higher. Troponin lower. Not much diuresis with current Lasix dose.    -Data reviewed today: I reviewed all new labs and imaging results over the last 24 hours.     Physical Exam   Temp: 98.5  F (36.9  C) Temp src: Oral BP: 126/58   Heart Rate: 70 Resp: 30 SpO2: 97 % O2 Device: BiPAP/CPAP Oxygen Delivery: Other (Comments) (30L)  Vitals:    04/21/17 1923 04/22/17 0100 04/23/17 0420   Weight: 53.3 kg (117 lb 8 oz) 51.4 kg (113 lb 5.1 oz) 51.7 kg (114 lb)     Vital Signs with Ranges  Temp:  [97.4  F (36.3  C)-98.5  F (36.9  C)] 98.5  F (36.9  C)  Heart Rate:  [69-78] 70  Resp:  [21-30] 30  BP: (101-147)/(46-59) 126/58  FiO2 (%):  [60 %-100 %] 75 %  SpO2:  [88 %-97 %] 97 %  I/O last 3  completed shifts:  In: 1015 [P.O.:815; I.V.:200]  Out: 1975 [Urine:1975]    Constitutional: No acute distress. On BiPAP.  Eyes: No xanthelasma or conjunctivitis  Respiratory: Very coarse throughout. Possibly worse than yesterday.  Cardiovascular: Regular rate and rhythm. Regular rate and rhythm, 3/6 mid peaking systolic murmur at the upper precordium.  Extremities: Trace peripheral edema.  Neurologic: Moving all extremities. No facial assymmetry.  Psychiatric: Alert and oriented. Answers questions appropriately.     Medications     nitroglycerin Stopped (04/21/17 2053)     - MEDICATION INSTRUCTIONS -       - MEDICATION INSTRUCTIONS -         furosemide  40 mg Intravenous BID     piperacillin-tazobactam  3.375 g Intravenous Q6H     furosemide  20 mg Intravenous Once     sodium chloride (PF)  3 mL Intracatheter Q8H     timolol  1 drop Both Eyes BID     multivitamin, therapeutic with minerals  1 tablet Oral Daily     lisinopril  20 mg Oral Daily     latanoprost  1 drop Both Eyes At Bedtime     brimonidine  1 drop Both Eyes BID     apixaban ANTICOAGULANT  2.5 mg Oral BID     amLODIPine  10 mg Oral Daily     vancomycin (VANCOCIN) IV  1,250 mg Intravenous Q48H       Data     Recent Labs  Lab 04/23/17  0530 04/22/17  1252 04/22/17  0526 04/21/17 2035 04/21/17  1928   WBC 17.6*  --  26.1*  --  17.6*   HGB 11.3*  --  13.6  --  16.7*   MCV 98  --  98  --  100   *  --  197  --  246   INR  --   --   --  1.16* Canceled, Test credited Unsatisfactory specimen - hemolyzed Notification of test cancellation was given toER via trackboard at 2020.SL     --  140 139 Canceled, Test credited Unsatisfactory specimen - hemolyzed Notification of test cancellation was given toER via trackboard at 2020.SL   POTASSIUM 3.5  --  4.1 4.0 Canceled, Test credited Unsatisfactory specimen - hemolyzed Notification of test cancellation was given toER via trackboard at 2020.SL   CHLORIDE 104  --  107 107 Canceled, Test credited  Unsatisfactory specimen - hemolyzed Notification of test cancellation was given toER via trackboard at 2020.SL   CO2 28  --  23 23 Canceled, Test credited Unsatisfactory specimen - hemolyzed Notification of test cancellation was given toER via trackboard at 2020.SL   BUN 31*  --  37* 32* Canceled, Test credited Unsatisfactory specimen - hemolyzed Notification of test cancellation was given toER via trackboard at 2020.SL   CR 1.11*  --  1.07* 1.34* Canceled, Test credited Unsatisfactory specimen - hemolyzed Notification of test cancellation was given toER via trackboard at 2020.SL   ANIONGAP 9  --  10 9 Canceled, Test credited Unsatisfactory specimen - hemolyzed Notification of test cancellation was given toER via trackboard at 2020.SL   CARLO 8.5  --  8.7 7.9* Canceled, Test credited Unsatisfactory specimen - hemolyzed Notification of test cancellation was given toER via trackboard at 2020.SL   *  --  125* 152* Canceled, Test credited Unsatisfactory specimen - hemolyzed Notification of test cancellation was given toER via trackboard at 2020.SL   ALBUMIN  --   --   --  2.9* Canceled, Test credited Unsatisfactory specimen - hemolyzed Notification of test cancellation was given toER via trackboard at 2020.SL   PROTTOTAL  --   --   --  6.3* Canceled, Test credited Unsatisfactory specimen - hemolyzed Notification of test cancellation was given toER via trackboard at 2020.SL   BILITOTAL  --   --   --  0.7 Canceled, Test credited Unsatisfactory specimen - hemolyzed Notification of test cancellation was given toER via trackboard at 2020.SL   ALKPHOS  --   --   --  121 Canceled, Test credited Unsatisfactory specimen - hemolyzed Notification of test cancellation was given toER via trackboard at 2020.SL   ALT  --   --   --  34 Canceled, Test credited Unsatisfactory specimen - hemolyzed Notification of test cancellation was given toER via trackboard at 2020.SL   AST  --   --   --  45 Canceled, Test credited Unsatisfactory  specimen - hemolyzed Notification of test cancellation was given toER via trackboard at 2020.SL   TROPI  --  0.084* 0.142* 0.075* Canceled, Test credited Unsatisfactory specimen - hemolyzed Notification of test cancellation was given toER via trackboard at 2020.SL       No results found for this or any previous visit (from the past 24 hour(s)).

## 2017-04-23 NOTE — PROGRESS NOTES
Patient was on HFNC 30L 100% throughout the night with SpO2 in the low to mid 90's.  Will cont to monitor and titrate down FiO2 as tolerated.  4/23/2017  Nissa Ross RRT

## 2017-04-23 NOTE — PROGRESS NOTES
[facility]    Hospitalist Progress Note    Assessment & Plan   Rebecca Helms is a 88 year old female with complex PMHx including severe valvular disease (moderate MR, moderate TR and mod-severe AS), hypertension, dyslipidemia, paroxsymal afib with hx of AVN ablation on chronic anticoagulation, sick sinus syndrome s/p ppm, chronic diastolic CHF and carotid artery stenosis who was admitted on 4/21/2017 with acute onset shortness of breath suspected secondary to flash pulmonary edema. She was initially requiring BiPAP and was admitted to the [facility] [place].    Note she was recently hospitalized here from 4/17/17 - 4/19/17 for evaluation of syncope. Workup included a repeat echocardiogram with findings as summarized above. Her medications were adjusted (Toprol XL dc'd and started on amlodipine 5mg daily). She had no s/sx of fluid overload during that stay.     Acute Hypoxic Respiratory Failure dt flash pulmonary edema in the setting of severe valvular disease:  Acute Exacerbation of Chronic Diastolic CHF:   Echocardiogram on 4/17 showed EF 60-65%, 2+MR, 2+TR and mod AS as well as elevated RVSP c/w severe pulmonary hypertension.  She developed acute onset after she went out to dinner with friends and became acutely short of breath after returning home. Typically not O2 dependent. O2 sats were as low as 70s when EMS arrived. In ED, CXR showed some hazy densities bilaterally. CT chest was neg for PE, showed extensive ground glass infiltrate throughout the lungs and some consolidative changes in the lower lobes (L worse than R). ProBNP 1900. Trop 0.075. WBC 17.6. Procalcitonin 0.13 (low risk of systemic bacterial infection). Lactate 3.4. Placed on BIPAP in the ED. Also started on Vancomycin and Zosyn given recent hospitalization. Initially placed on nitro gtt but this was quickly weaned while she was in the ED  -- acute onset of respiratory symptoms more suggestive of flash pulmonary edema than pneumonia, no reported  cough/fever prior to admission  -- Vanco and Zosyn continued on admission, leukocytosis persists but is improving, remains afebrile, interestingly procalcitonin continues to trend up, serial lactates remain nl -> cont IV antibiotics for now, monitor serial procalcitonins  -- cont diuresis with IV Lasix but increase to 40mg IV BID (from 20mg IV BID yesterday)  -- on/off BiPAP, alternating with high flow O2 as patient tolerates -> presently on BiPAP 10/5 with 75% FiO2  -- cardiology following, assistance greatly appreciated    Elevated troponin dt demand ischemia:  Suspect demand ischemia in the setting of acute hypoxic respiratory failure.   -- serial troponins remained relatively flat (0.075 -> 0.142 -> 0.084)  -- hold off on repeating echocardiogram as she just had one on 4/17/17    Paroxysmal Afib with hx of AV Node Ablation  Sick Sinus Syndrome s/p ppm:  Remains paced.   Anticoagulated with Eliquis -> hold this mornings dose given reported episode of hemoptysis on 4/22 PM with ongoing pinkish sputum tonight  Toprol XL dc'd during recent hospital stay, no issues with rate control thus far    Hypertension:  Meds adjusted during recent hospital stay -> Toprol XL dc'd and was started on amlodipine 10mg daily though I see this was on hold per [facility] as of 4/20  -- cont lisinopril 20mg daily and amlodipine 10mg daily, BPs remain stable    Dyslipidemia:  Chronic and stable on statin. Can resume once off BiPAP.    Stage III CKD:  Baseline Cr seems to be 1.1-1.2.   -- monitor renal function while diuresing, Cr remains stable thus far    Glaucoma:  Cont home eye gtts.    Anemia with episode of hemoptysis:  -- had episode of hemoptysis on 4/22 PM  -- hgb trending down this stay: 13.6 on 4/22 -> 11.3 on 4/23   -- still with some ongoing pinkish sputum, ?dt pulmonary edema vs resolving hemoptysis -> hold Eliquis this morning    FEN: no IVFs, lytes stable, NPO while on BiPAP  DVT Prophylaxis: anticoagulated on  Eliquis  Code Status: DNR/DNI    Disposition: Keep on IMC status today as she remains on BiPAP/high flow O2. Will likely be hospitalized for several more days. Will eventually need therapy consults. May also want to consider a palliative care consult as this represents her 5th hospitalization this calendar year.    Toshia Amaro    Interval History   Overnigth events noted - had episode of yesenia hemoptysis last evening. Given an extra dose of Lasix. Had been taken off BiPAP and placed on high flow O2. Given ongoing high needs she has been placed back on BiPAP this morning. No recurrent hemoptysis but sputum does appear pinkish this morning (?if dt hemoptysis vs pulmonary edema). Seen this morning, resting quietly on BiPAP. Opens eyes to name. Does not appear distressed    -Data reviewed today: I reviewed all new labs and imaging results over the last 24 hours. I personally reviewed no images or EKG's today.    Physical Exam   Temp: 98.5  F (36.9  C) Temp src: Oral BP: 126/58   Heart Rate: 70 Resp: 30 SpO2: 97 % O2 Device: BiPAP/CPAP Oxygen Delivery: Other (Comments) (30L)  Vitals:    04/21/17 1923 04/22/17 0100 04/23/17 0420   Weight: 53.3 kg (117 lb 8 oz) 51.4 kg (113 lb 5.1 oz) 51.7 kg (114 lb)     Vital Signs with Ranges  Temp:  [97.4  F (36.3  C)-98.5  F (36.9  C)] 98.5  F (36.9  C)  Heart Rate:  [69-78] 70  Resp:  [21-30] 30  BP: (101-139)/(46-59) 126/58  FiO2 (%):  [60 %-100 %] 75 %  SpO2:  [88 %-97 %] 97 %  I/O last 3 completed shifts:  In: 1015 [P.O.:815; I.V.:200]  Out: 1975 [Urine:1975]    Constitutional: Resting comfortably, alert, NAD  Respiratory: +scattered coarse breath sounds with bibasilar crackles more pronounced at the L base, no wheeze/rhonchi  Cardiovascular: HRRR w/++SM throughout precordium  GI: S, NT, ND, +BS  Skin/Integumen: warm/dry  Other:  trace bilateral LE edema to mid shins    Medications     nitroglycerin Stopped (04/21/17 2053)     - MEDICATION INSTRUCTIONS -       -  MEDICATION INSTRUCTIONS -         furosemide  40 mg Intravenous BID     piperacillin-tazobactam  3.375 g Intravenous Q6H     furosemide  20 mg Intravenous Once     sodium chloride (PF)  3 mL Intracatheter Q8H     timolol  1 drop Both Eyes BID     multivitamin, therapeutic with minerals  1 tablet Oral Daily     lisinopril  20 mg Oral Daily     latanoprost  1 drop Both Eyes At Bedtime     brimonidine  1 drop Both Eyes BID     apixaban ANTICOAGULANT  2.5 mg Oral BID     amLODIPine  10 mg Oral Daily     vancomycin (VANCOCIN) IV  1,250 mg Intravenous Q48H       Data     Recent Labs  Lab 04/23/17  0530 04/22/17  1252 04/22/17  0526 04/21/17 2035 04/21/17  1928   WBC 17.6*  --  26.1*  --  17.6*   HGB 11.3*  --  13.6  --  16.7*   MCV 98  --  98  --  100   *  --  197  --  246   INR  --   --   --  1.16* Canceled, Test credited Unsatisfactory specimen - hemolyzed Notification of test cancellation was given toER via trackboard at 2020.SL     --  140 139 Canceled, Test credited Unsatisfactory specimen - hemolyzed Notification of test cancellation was given toER via trackboard at 2020.SL   POTASSIUM 3.5  --  4.1 4.0 Canceled, Test credited Unsatisfactory specimen - hemolyzed Notification of test cancellation was given toER via trackboard at 2020.SL   CHLORIDE 104  --  107 107 Canceled, Test credited Unsatisfactory specimen - hemolyzed Notification of test cancellation was given toER via trackboard at 2020.SL   CO2 28  --  23 23 Canceled, Test credited Unsatisfactory specimen - hemolyzed Notification of test cancellation was given toER via trackboard at 2020.SL   BUN 31*  --  37* 32* Canceled, Test credited Unsatisfactory specimen - hemolyzed Notification of test cancellation was given toER via trackboard at 2020.SL   CR 1.11*  --  1.07* 1.34* Canceled, Test credited Unsatisfactory specimen - hemolyzed Notification of test cancellation was given toER via trackboard at 2020.SL   ANIONGAP 9  --  10 9 Canceled, Test  credited Unsatisfactory specimen - hemolyzed Notification of test cancellation was given toER via trackboard at 2020.SL   CARLO 8.5  --  8.7 7.9* Canceled, Test credited Unsatisfactory specimen - hemolyzed Notification of test cancellation was given toER via trackboard at 2020.SL   *  --  125* 152* Canceled, Test credited Unsatisfactory specimen - hemolyzed Notification of test cancellation was given toER via trackboard at 2020.SL   ALBUMIN  --   --   --  2.9* Canceled, Test credited Unsatisfactory specimen - hemolyzed Notification of test cancellation was given toER via trackboard at 2020.SL   PROTTOTAL  --   --   --  6.3* Canceled, Test credited Unsatisfactory specimen - hemolyzed Notification of test cancellation was given toER via trackboard at 2020.SL   BILITOTAL  --   --   --  0.7 Canceled, Test credited Unsatisfactory specimen - hemolyzed Notification of test cancellation was given toER via trackboard at 2020.SL   ALKPHOS  --   --   --  121 Canceled, Test credited Unsatisfactory specimen - hemolyzed Notification of test cancellation was given toER via trackboard at 2020.SL   ALT  --   --   --  34 Canceled, Test credited Unsatisfactory specimen - hemolyzed Notification of test cancellation was given toER via trackboard at 2020.SL   AST  --   --   --  45 Canceled, Test credited Unsatisfactory specimen - hemolyzed Notification of test cancellation was given toER via trackboard at 2020.SL   TROPI  --  0.084* 0.142* 0.075* Canceled, Test credited Unsatisfactory specimen - hemolyzed Notification of test cancellation was given toER via trackboard at 2020.SL       No results found for this or any previous visit (from the past 24 hour(s)).

## 2017-04-24 NOTE — PROGRESS NOTES
Assessment and Plan:     1. Acute on chronic dyspnea. Findings most consistent with sudden-onset pulmonary edema, less likely pneumonia.   2. Acute on chronic HFpEF.  3. Less likely pneumonia, although she has an intermediate level procalcitonin and marked leukocytosis. She also has hemoptysis, with greater than silver dollar size, bright red blood spots of blood with coughing. A chemical pneumonitis is also possible due to aspiration of this blood. Eliquis has been stopped. Hemoptysis continues.  4. Aortic stenosis. Probably moderate in severity. Mean gradient only 14 mmHg, CHUCK 1.2, with low stroke volume, moderate LVH.  5. Moderately severe MR. Etiology unclear  6. Moderately severe TR.  7. Severe pulmonary hypertension, PA systolic pressure likely in the 60's-70's.  8. 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 Initially seemed helpful to have consistent HR control with episodes of afib, but obviously did not prevent this epidsode.  9. HTN with labile BP's. Recent admission for near syncope, with lowered doses of BP meds. BP still low now at times.  10. Elevated troponin. Likely due to acute HF episode. CAD has not been exluded.    I had a yesenia discussion with her today about the continued difficulties managing her many problems and poor prognosis. She seems to understand the situation and does not want to have surgery for her heart. She states that she has had a good life and understands that her time may be short. She is agreeable to talk to palliative care and wants to talk with a  from [facility].     I suggested that we might be able to find a better treatment approach if we have some additional information from a right and left heart catheterization. I described the procedure to her and she is agreeable if it is not too uncomfortable and if we talk to her daughter about it. I tried to call her daughter this am but she was not available. I will try again later.     For now, I think we can  "go ahead with the palliative care consult. I will take her off IV NTG, restart low dose metoprolol, and consider adding some Imdur. Lisinopril seems to be well tolerated and she has normal renal function. Gentle diuresis. Her weight does not seem to be elevated from baseline.    Empiric ATBx seem reasonable. Hold Eliquis/heparin due to hemoptysis.     Luis White MD  Cardiology.          Interval History:   Less dyspneic. On high-flow nasal canula oxygen. Still coughing with hemoptysis--bright red spots of blood about 3cm in diameter.          Medications:       furosemide  40 mg Intravenous BID     piperacillin-tazobactam  3.375 g Intravenous Q6H     sodium chloride (PF)  3 mL Intracatheter Q8H     timolol  1 drop Both Eyes BID     multivitamin, therapeutic with minerals  1 tablet Oral Daily     lisinopril  20 mg Oral Daily     latanoprost  1 drop Both Eyes At Bedtime     brimonidine  1 drop Both Eyes BID     apixaban ANTICOAGULANT  2.5 mg Oral BID     amLODIPine  10 mg Oral Daily     vancomycin (VANCOCIN) IV  1,250 mg Intravenous Q48H            Physical Exam:   Blood pressure 127/63, pulse 95, temperature 98.3  F (36.8  C), temperature source Axillary, resp. rate 22, height 1.6 m (5' 3\"), weight 51.7 kg (113 lb 14.4 oz), SpO2 96 %.    Vitals:    04/22/17 0100 04/23/17 0420 04/24/17 0500   Weight: 51.4 kg (113 lb 5.1 oz) 51.7 kg (114 lb) 51.7 kg (113 lb 14.4 oz)         Intake/Output Summary (Last 24 hours) at 04/24/17 0948  Last data filed at 04/24/17 0600   Gross per 24 hour   Intake           435.66 ml   Output             1400 ml   Net          -964.34 ml   .    Exam:  GENERAL APPEARANCE ADULT: Alert, in no acute distress  RESP: rhonchi mild, no respiratory distress  CV: regular rate with systolic murmur  ABDOMEN: normal bowel sounds, soft, nontender, no hepatosplenomegaly or other masses  EXTREMITIES: No edema         Data:   LABS (Last four results)  CMP  Recent Labs  Lab 04/24/17  0628 04/23/17  0530 " 04/22/17  0526 04/21/17 2035 04/21/17 1928 04/18/17  0335    141 140 139 Canceled, Test credited Unsatisfactory specimen - hemolyzed Notification of test cancellation was given toER via trackboard at 2020.   POTASSIUM 3.3* 3.5 4.1 4.0 Canceled, Test credited Unsatisfactory specimen - hemolyzed Notification of test cancellation was given toER via trackboard at 2020.SL 4.5   CHLORIDE 100 104 107 107 Canceled, Test credited Unsatisfactory specimen - hemolyzed Notification of test cancellation was given toER via trackboard at 2020.   CO2 27 28 23 23 Canceled, Test credited Unsatisfactory specimen - hemolyzed Notification of test cancellation was given toER via trackboard at 2020.SL 24   ANIONGAP 10 9 10 9 Canceled, Test credited Unsatisfactory specimen - hemolyzed Notification of test cancellation was given toER via trackboard at 2020. 9   GLC 97 111* 125* 152* Canceled, Test credited Unsatisfactory specimen - hemolyzed Notification of test cancellation was given toER via trackboard at 2020.SL 86   BUN 30 31* 37* 32* Canceled, Test credited Unsatisfactory specimen - hemolyzed Notification of test cancellation was given toER via trackboard at 2020.SL 27   CR 1.14* 1.11* 1.07* 1.34* Canceled, Test credited Unsatisfactory specimen - hemolyzed Notification of test cancellation was given toER via trackboard at 2020.SL 1.15*   GFRESTIMATED 45* 46* 48* 37* Canceled, Test credited Unsatisfactory specimen - hemolyzed Notification of test cancellation was given toER via trackboard at 2020.SL 44*   GFRESTBLACK 54* 56* 58* 45* Canceled, Test credited Unsatisfactory specimen - hemolyzed Notification of test cancellation was given toER via trackboard at 2020.SL 54*   CARLO 8.8 8.5 8.7 7.9* Canceled, Test credited Unsatisfactory specimen - hemolyzed Notification of test cancellation was given toER via trackboard at 2020.SL 9.0   PROTTOTAL  --   --   --  6.3* Canceled, Test credited Unsatisfactory specimen -  hemolyzed Notification of test cancellation was given toER via trackboard at 2020.SL 6.6*   ALBUMIN  --   --   --  2.9* Canceled, Test credited Unsatisfactory specimen - hemolyzed Notification of test cancellation was given toER via trackboard at 2020.SL 3.5   BILITOTAL  --   --   --  0.7 Canceled, Test credited Unsatisfactory specimen - hemolyzed Notification of test cancellation was given toER via trackboard at 2020.SL 0.9   ALKPHOS  --   --   --  121 Canceled, Test credited Unsatisfactory specimen - hemolyzed Notification of test cancellation was given toER via trackboard at 2020.   AST  --   --   --  45 Canceled, Test credited Unsatisfactory specimen - hemolyzed Notification of test cancellation was given toER via trackboard at 2020.SL 36   ALT  --   --   --  34 Canceled, Test credited Unsatisfactory specimen - hemolyzed Notification of test cancellation was given toER via trackboard at 2020.SL 35     CBC  Recent Labs  Lab 04/24/17  0628 04/23/17  0530 04/22/17  0526 04/21/17 1928   WBC 20.8* 17.6* 26.1* 17.6*   RBC 3.60* 3.52* 4.07 4.95   HGB 11.8 11.3* 13.6 16.7*   HCT 35.0 34.4* 39.7 49.5*   MCV 97 98 98 100   MCH 32.8 32.1 33.4* 33.7*   MCHC 33.7 32.8 34.3 33.7   RDW 16.4* 16.8* 16.3* 16.8*   * 148* 197 246     INR  Recent Labs  Lab 04/21/17 2035 04/21/17 1928   INR 1.16* Canceled, Test credited Unsatisfactory specimen - hemolyzed Notification of test cancellation was given toER via trackboard at 2020.SL     TROPONINS Lab Results   Component Value Date    TROPI 0.084 (H) 04/22/2017    TROPI 0.142 (HH) 04/22/2017    TROPI 0.075 (H) 04/21/2017    TROPI  04/21/2017     Canceled, Test credited   Unsatisfactory specimen - hemolyzed   Notification of test cancellation was given to  ER via trackboard at 2020.SL      TROPI 0.035 04/18/2017                                                                                                               YOKO HARRELL MD

## 2017-04-24 NOTE — PROGRESS NOTES
[facility]    Hospitalist Progress Note    Assessment & Plan   Rebecca Helms is a 88 year old female with complex PMHx including severe valvular disease (moderate MR, moderate TR and mod-severe AS), hypertension, dyslipidemia, paroxsymal afib with hx of AVN ablation on chronic anticoagulation, sick sinus syndrome s/p ppm, chronic diastolic CHF and carotid artery stenosis who was admitted on 4/21/2017 with acute onset shortness of breath suspected secondary to flash pulmonary edema. She was initially requiring BiPAP and [place] [facility] [place]    Note she was recently hospitalized here from 4/17/17 - 4/19/17 for evaluation of syncope. Workup included a repeat echocardiogram with findings as summarized above. Her medications were adjusted (Toprol XL dc'd and started on amlodipine 5mg daily). She had no s/sx of fluid overload during that stay.     Acute Hypoxic Respiratory Failure dt flash pulmonary edema in the setting of severe valvular disease:  Acute Exacerbation of Chronic Diastolic CHF:   Echocardiogram on 4/17 showed EF 60-65%, 2+MR, 2+TR and mod AS as well as elevated RVSP c/w severe pulmonary hypertension.  She developed acute onset after she went out to dinner with friends and became acutely short of breath after returning home. Typically not O2 dependent. O2 sats were as low as 70s when EMS arrived. In ED, CXR showed some hazy densities bilaterally. CT chest was neg for PE, showed extensive ground glass infiltrate throughout the lungs and some consolidative changes in the lower lobes (L worse than R). ProBNP 1900. Trop 0.075. WBC 17.6. Procalcitonin 0.13 (low risk of systemic bacterial infection). Lactate 3.4. Placed on BIPAP in the ED. Also started on Vancomycin and Zosyn given recent hospitalization. Initially placed on nitro gtt but this was quickly weaned while she was in the ED  -- acute onset of respiratory symptoms more suggestive of flash pulmonary edema than pneumonia, no reported  cough/fever prior to admission  -- cont diuresis with Lasix 40mg IV BID  -- Vanco and Zosyn continued on admission; leukocytosis has been variable (17-> 26 ->17->20), remains afebrile, interestingly procalcitonin continues to trend up, serial lactates have remain nl -> cont IV antibiotics for now, monitor serial procalcitonins  -- weaned off BiPAP and has remained on high flow NC overnight (30L 100% FiO2) -> wean as able  -- cardiology following, assistance greatly appreciated    Elevated troponin dt demand ischemia:  Suspect demand ischemia in the setting of acute hypoxic respiratory failure.   -- serial troponins remained relatively flat (0.075 -> 0.142 -> 0.084)  -- hold off on repeating echocardiogram as she just had one on 4/17/17  -- had episode of chest pain on 4/23 PM and was placed on nitro gtt -> wean off as able    Paroxysmal Afib with hx of AV Node Ablation  Sick Sinus Syndrome s/p ppm:  Remains paced.   Anticoagulated with Eliquis -> held 4/23 dt reported hemoptysis, okay to resume today  Toprol XL dc'd during recent hospital stay, no issues with rate control thus far    Hypertension:  Meds adjusted during recent hospital stay -> Toprol XL dc'd and was started on amlodipine 10mg daily though I see this was on hold per [facility] as of 4/20  -- cont lisinopril 20mg daily and amlodipine 10mg daily, BPs remain stable    Dyslipidemia:  Chronic and stable on statin. Can resume once off BiPAP.    Stage III CKD:  Baseline Cr seems to be 1.1-1.2.   -- monitor renal function while diuresing, Cr remains stable thus far    Glaucoma:  Cont home eye gtts.    Anemia with episode of hemoptysis:  -- had episode of hemoptysis on 4/22 PM - 4/23 AM  -- hgb initially trended down: 13.6 on 4/22 -> 11.3 on 4/23 but has since remained stable around 11  -- ?due in part to pulmonary edema  -- Eliquis held 4/23, hemoptysis improved and hgb stable today so should be okay to resume anticoagulation today    FEN: no IVFs, lytes  stable, cardiac/low Na diet  DVT Prophylaxis: anticoagulated on Eliquis  Code Status: DNR/DNI    Disposition: Keep on IMC status today as she remains on BiPAP/high flow O2. Will likely be hospitalized for several more days. Will eventually need therapy consults. This represents her 5th hospitalization this calendar year discussed palliative care consult to discern goals of care - she is agreeable to this. Notes her daughter Lainey LEIVA) lives out of state.    Toshia Amaro    Interval History   Overnight events noted - developed chest pain last evening, cardiology contacted and was placed back on a nitro gtt. No issues with recurrent hemoptysis though Eliquis was held yesterday. Seen this morning, appears to be resting comfortably. Denies chest pain at present (though still on nitro gtt). Breathing okay on high flow. Says she has still been occasionally coughing up bloody sputum. Discussed her multiple hospital stays this year and whether she would be agreeable to a palliative care consult to discuss goals of care - she was okay with this.     -Data reviewed today: I reviewed all new labs and imaging results over the last 24 hours. I personally reviewed no images or EKG's today.    Physical Exam   Temp: 98.3  F (36.8  C) Temp src: Axillary BP: 127/63   Heart Rate: 70 Resp: 22 SpO2: 96 % O2 Device: High Flow Nasal Cannula (HFNC)    Vitals:    04/22/17 0100 04/23/17 0420 04/24/17 0500   Weight: 51.4 kg (113 lb 5.1 oz) 51.7 kg (114 lb) 51.7 kg (113 lb 14.4 oz)     Vital Signs with Ranges  Temp:  [97.4  F (36.3  C)-98.3  F (36.8  C)] 98.3  F (36.8  C)  Heart Rate:  [70-73] 70  Resp:  [10-29] 22  BP: ()/(46-79) 127/63  FiO2 (%):  [75 %-100 %] 100 %  SpO2:  [84 %-98 %] 96 %  I/O last 3 completed shifts:  In: 915.66 [P.O.:680; I.V.:235.66]  Out: 1650 [Urine:1650]    Constitutional: Resting comfortably, alert and conversing appropriately, NAD  Respiratory: +bibasilar crackles more pronounced at the L base,  no wheeze/rhonchi, no accessory M use  Cardiovascular: HRRR w/++SM throughout precordium  GI: S, NT, ND, +BS  Skin/Integumen: warm/dry  Other: No LE edema    Medications     nitroglycerin 0.07 mcg/kg/min (04/24/17 0300)     - MEDICATION INSTRUCTIONS -       - MEDICATION INSTRUCTIONS -         furosemide  40 mg Intravenous BID     piperacillin-tazobactam  3.375 g Intravenous Q6H     furosemide  20 mg Intravenous Once     sodium chloride (PF)  3 mL Intracatheter Q8H     timolol  1 drop Both Eyes BID     multivitamin, therapeutic with minerals  1 tablet Oral Daily     lisinopril  20 mg Oral Daily     latanoprost  1 drop Both Eyes At Bedtime     brimonidine  1 drop Both Eyes BID     apixaban ANTICOAGULANT  2.5 mg Oral BID     amLODIPine  10 mg Oral Daily     vancomycin (VANCOCIN) IV  1,250 mg Intravenous Q48H       Data     Recent Labs  Lab 04/24/17  0628 04/23/17  0530 04/22/17  1252 04/22/17  0526 04/21/17  2035 04/21/17  1928   WBC 20.8* 17.6*  --  26.1*  --  17.6*   HGB 11.8 11.3*  --  13.6  --  16.7*   MCV 97 98  --  98  --  100   * 148*  --  197  --  246   INR  --   --   --   --  1.16* Canceled, Test credited Unsatisfactory specimen - hemolyzed Notification of test cancellation was given toER via trackboard at 2020.SL    141  --  140 139 Canceled, Test credited Unsatisfactory specimen - hemolyzed Notification of test cancellation was given toER via trackboard at 2020.SL   POTASSIUM 3.3* 3.5  --  4.1 4.0 Canceled, Test credited Unsatisfactory specimen - hemolyzed Notification of test cancellation was given toER via trackboard at 2020.SL   CHLORIDE 100 104  --  107 107 Canceled, Test credited Unsatisfactory specimen - hemolyzed Notification of test cancellation was given toER via trackboard at 2020.SL   CO2 27 28  --  23 23 Canceled, Test credited Unsatisfactory specimen - hemolyzed Notification of test cancellation was given toER via trackboard at 2020.SL   BUN 30 31*  --  37* 32* Canceled, Test  credited Unsatisfactory specimen - hemolyzed Notification of test cancellation was given toER via trackboard at 2020.SL   CR 1.14* 1.11*  --  1.07* 1.34* Canceled, Test credited Unsatisfactory specimen - hemolyzed Notification of test cancellation was given toER via trackboard at 2020.SL   ANIONGAP 10 9  --  10 9 Canceled, Test credited Unsatisfactory specimen - hemolyzed Notification of test cancellation was given toER via trackboard at 2020.SL   CARLO 8.8 8.5  --  8.7 7.9* Canceled, Test credited Unsatisfactory specimen - hemolyzed Notification of test cancellation was given toER via trackboard at 2020.SL   GLC 97 111*  --  125* 152* Canceled, Test credited Unsatisfactory specimen - hemolyzed Notification of test cancellation was given toER via trackboard at 2020.SL   ALBUMIN  --   --   --   --  2.9* Canceled, Test credited Unsatisfactory specimen - hemolyzed Notification of test cancellation was given toER via trackboard at 2020.SL   PROTTOTAL  --   --   --   --  6.3* Canceled, Test credited Unsatisfactory specimen - hemolyzed Notification of test cancellation was given toER via trackboard at 2020.SL   BILITOTAL  --   --   --   --  0.7 Canceled, Test credited Unsatisfactory specimen - hemolyzed Notification of test cancellation was given toER via trackboard at 2020.SL   ALKPHOS  --   --   --   --  121 Canceled, Test credited Unsatisfactory specimen - hemolyzed Notification of test cancellation was given toER via trackboard at 2020.SL   ALT  --   --   --   --  34 Canceled, Test credited Unsatisfactory specimen - hemolyzed Notification of test cancellation was given toER via trackboard at 2020.SL   AST  --   --   --   --  45 Canceled, Test credited Unsatisfactory specimen - hemolyzed Notification of test cancellation was given toER via trackboard at 2020.SL   TROPI  --   --  0.084* 0.142* 0.075* Canceled, Test credited Unsatisfactory specimen - hemolyzed Notification of test cancellation was given toER via trackboard  at 2020.SL       No results found for this or any previous visit (from the past 24 hour(s)).

## 2017-04-24 NOTE — CONSULTS
[facility]    Palliative Care Consultation     Rebecca Helms  MRN# [id number]  Date of Admission:  2017  Date of Service (when I saw the patient): 17  Reason for consult: Consulted by Dr. Amaro for Goals of care    Assessment & Plan   Rebecca Helms is a 88 year old female with PMH significant for severe valvular disease, HTN, HLD, paroxysmal a.fib with hx AVN ablation on chronic anticoagulation, sick sinus syndrome, chronic diastolic HF, and CAD who presents with acute onset SOB. She is being treated for PNA and pulmonary edema, Cardiology is following. She initially required bipap, but is now stable on high flow O2. She has had 4 hospitalizations in the recent months. We are consulted for goals of care.     Symptoms/Recommendations   1. Dyspnea. Appears stable at this time, speaking in full sentences for close to an hour, did not appear uncomfortable.   -Continue high flow O2, diuresis, abx    -Could consider low dose opioid if sx worsen   -Pt is willing to proceed with heart catheterization     Support/Coping  -Pt's   2 years ago, he was very ill and spent much of his remaining time in the hospital (for the last year)   -She has 2 adult children, Lainey lives in [place]  -Her son lives in [facility]  -She is very Mandaen, Mu-ism, and values her community at [place]. Her  has been contacted by     Decisional Support, Goals of Care, Counseling & Coordination  Decisional Capacity Intact?  -Yes   Health Care Directive on File?  -Yes, recommend completing a POLST form   Code Status/Resuscitation Preferences?  -DNR/DNI    Discussion  Introduced the scope of our practice to Rebecca. Discussed our potential roles for symptom management, support/coping, and decisional support (aka goals of care).     Rebecca understands that her health is not good. She has been hospitalized several times of late. Each time she gets sick, it is harder to bounce back. She has not  needed to go to rehab after her recent hospitalizations, but has had home care services. She states she is open to TCU after this hospitalization, if needed. She values her independence and loves her senior apartment at [address] [place] [address]. She lost he  2 years ago and life has not been the same since.     Rebecca talked with Cardiology today with the plan to proceed with the heart cath. She hopes it will help her feel better and help her live a bit longer, but would not be surprised if she dies within the year. I asked her if she is worried about anything. She wants to ensure her  arrangements are completed; her daughter Lainey is taking care of this.     She tells me she would ideally like to get better from this hospitalization, but the next time she gets sick, she thinks it will be time for hospice and comfort. We confirmed her DNR/DNI status. I suggest filling out a POLST form to further designate her wishes, particularly when she leaves the hospital. She wants me to discuss this with Lainey before completing this.     Rebecca's vince is very important to her. She shared a lot about her Synagogue and the Synagogue community.     Rebecca asked if I would talk to Lainey about our conversation. I called Lainey and introduced myself and our service. She did have the opportunity to talk to Dr. White about the heart cath. She is in agreement if that is what Rebecca wants. She is worried that Rebecca may be a little unrealistic about her health and goals. I shared my conversation with Rebecca to Lainey, which she found helpful. Lainey worries about Rebecca living independently and thinks it would be best for her to have some rehabilitation before attempting to return home. Lainey is in support of completing a POLST. She also knows we will stay involved as additional support, particularly should Rebecca begin to decline while inpatient.     Our team will attempt to return in the next 1-2 days to complete a POLST with Rebecca.     Case  reviewed with unit TEMITOPE Kirby.     Thank you for involving us in the care of this patient and family. We will continue to follow at this time. Please do not hesitate to contact me with questions or concerns or the on-call provider for our team if evening or weekend.    Ariadne NICOLE, [facility]  Palliative Medicine   Pager [phone]    Attestation:  Total time on the floor involved in the patient's care: 75 minutes  Total time spent in counseling/care coordination: >50%    Chief Complaint   SOB    History is obtained from the patient, staff, family and extensive chart review.     Past Medical History    I have reviewed this patient's medical history and updated it with pertinent information if needed.   Past Medical History:   Diagnosis Date     Aortic stenosis     moderate to severe     Back pain      Carotid stenosis     left side     Chronic diastolic CHF (congestive heart failure) (H)      Chronic renal insufficiency      Dyslipidemia      Glaucoma      HTN (hypertension)      Osteoporosis      Paroxysmal atrial fibrillation (H) 2014    sp AVN ablation 2017     Pulmonary hypertension (H)     moderate to severe     Sinus node dysfunction (H)     sp DDD PM     Syncope        Past Surgical History   I have reviewed this patient's surgical history and updated it with pertinent information if needed.  Past Surgical History:   Procedure Laterality Date     DILATION AND CURETTAGE, HYSTEROSCOPY DIAGNOSTIC, COMBINED  2013    Procedure: COMBINED DILATION AND CURETTAGE, HYSTEROSCOPY DIAGNOSTIC;   DILATION AND CURETTAGE, HYSTEROSCOPY;  Surgeon: Ozzie Lo MD;  Location: [facility]     ENT SURGERY      TONSILLECTOMY/BILAT EAR SURGERY     EYE SURGERY      BILAT CATARACT-IOL     GYN SURGERY      OVARIAN CYSTECTOMY     IMPLANT PACEMAKER      dual chamber       Social History   Living situation: Lives in a [facility], [address] [place] [address]:   2 years ago after being sick and in the hospital  basically for his last year. Daughter Lainey lives in [place] and very supportive, calls every day. Son lives in [facility], I do not believe they are very close     Self-identified support system: Friends at her building, Lainey Mar     Employment/education: Worked as a      Activities/interests: Yarsanism, choir     Use of community resources: None     Anabaptist affiliation: [employer]     Involvement in vince community: [facility]     Impact of illness on patient: She has very serious, end stage cardiac issues. She does not feel she will live through the year     Family History   I have reviewed this patient's family history and updated it with pertinent information if needed.   Family History   Problem Relation Age of Onset     Unknown/Adopted Mother      Myocardial Infarction Father      Heart Surgery Father        Allergies   Allergies   Allergen Reactions     Amiodarone Visual Disturbance     Decreased visual acuity         Medications   Current Facility-Administered Medications Ordered in Epic   Medication Dose Route Frequency Last Rate Last Dose     furosemide (LASIX) injection 20 mg  20 mg Intravenous BID         metoprolol (LOPRESSOR) tablet 25 mg  25 mg Oral BID   25 mg at 04/24/17 1201     [START ON 4/25/2017] amLODIPine (NORVASC) tablet 5 mg  5 mg Oral Daily         potassium chloride SA (K-DUR/KLOR-CON M) CR tablet 20 mEq  20 mEq Oral Once         piperacillin-tazobactam (ZOSYN) 3.375 g vial to attach to  mL bag  3.375 g Intravenous Q6H   3.375 g at 04/24/17 1054     labetalol (NORMODYNE/TRANDATE) injection 10 mg  10 mg Intravenous Q2H PRN         naloxone (NARCAN) injection 0.1-0.4 mg  0.1-0.4 mg Intravenous Q2 Min PRN         lidocaine 1 % 1 mL  1 mL Other Q1H PRN         lidocaine (LMX4) cream   Topical Q1H PRN         sodium chloride (PF) 0.9% PF flush 3 mL  3 mL Intracatheter Q1H PRN         sodium chloride (PF) 0.9% PF flush 3 mL  3 mL Intracatheter Q8H    3 mL at 04/22/17 1638     nitroglycerin (NITROSTAT) sublingual tablet 0.4 mg  0.4 mg Sublingual Q5 Min PRN         Patient is already receiving anticoagulation with heparin, enoxaparin (LOVENOX), warfarin (COUMADIN)  or other anticoagulant medication   Does not apply Continuous PRN         acetaminophen (TYLENOL) tablet 650 mg  650 mg Oral Q4H PRN   650 mg at 04/24/17 1006     ondansetron (ZOFRAN-ODT) ODT tab 4 mg  4 mg Oral Q6H PRN        Or     ondansetron (ZOFRAN) injection 4 mg  4 mg Intravenous Q6H PRN         No lozenges or gum should be given while patient on BIPAP   Does not apply Continuous PRN         hypromellose-dextran (ARTIFICAL TEARS) ophthalmic solution 1 drop  1 drop Both Eyes Q1H PRN         HOLD: All Oral Medications   Does not apply HOLD         timolol (TIMOPTIC) 0.5 % ophthalmic solution 1 drop  1 drop Both Eyes BID   1 drop at 04/24/17 1009     multivitamin, therapeutic with minerals (THERA-VIT-M) tablet 1 tablet  1 tablet Oral Daily   1 tablet at 04/24/17 0955     moxifloxacin (VIGAMOX) 0.5 % ophthalmic solution 1 drop  1 drop Left Eye Daily PRN   1 drop at 04/23/17 2042     lisinopril (PRINIVIL/ZESTRIL) tablet 20 mg  20 mg Oral Daily   20 mg at 04/24/17 0955     latanoprost (XALATAN) 0.005 % ophthalmic solution 1 drop  1 drop Both Eyes At Bedtime   1 drop at 04/23/17 2146     brimonidine (ALPHAGAN) 0.2 % ophthalmic solution 1 drop  1 drop Both Eyes BID   1 drop at 04/24/17 1008     vancomycin 1250 mg in 0.9% NaCl 250 mL PREMIX  1,250 mg Intravenous Q48H   1,250 mg at 04/22/17 2142     No current Epic-ordered outpatient prescriptions on file.       Review of Systems   The comprehensive review of systems is negative other than noted here and in the assessment/plan.    Palliative Symptom Review (0=no symptom/no concern, 1=mild, 2=moderate, 3=severe):  Pain: 0-1  Fatigue: 0-1  Nausea: 0  Constipation: 0  Diarrhea: 0  Depressive Symptoms: 0  Anxiety: 0  Drowsiness: 0  Poor Appetite: 0  Shortness  of Breath: 1    Physical Exam   Temp: 97.8  F (36.6  C) Temp src: Oral BP: 108/77   Heart Rate: 70 Resp: 21 SpO2: 91 % O2 Device: High Flow Nasal Cannula (HFNC)    Vitals:    04/22/17 0100 04/23/17 0420 04/24/17 0500   Weight: 51.4 kg (113 lb 5.1 oz) 51.7 kg (114 lb) 51.7 kg (113 lb 14.4 oz)     CONSTITUTIONAL: Chronically ill elderly woman seen sitting up in the chair in NAD, A&Ox3. Calm and cooperative.  HEENT: NCAT  NECK: Supple  RESPIRATORY: NL respiratory effort on high flow O2  NEUROLOGIC: Appropriately responsive during interview  PSYCH: Affect engaged     Data   Results for orders placed or performed during the hospital encounter of 04/21/17 (from the past 24 hour(s))   Basic metabolic panel   Result Value Ref Range    Sodium 137 133 - 144 mmol/L    Potassium 3.3 (L) 3.4 - 5.3 mmol/L    Chloride 100 94 - 109 mmol/L    Carbon Dioxide 27 20 - 32 mmol/L    Anion Gap 10 3 - 14 mmol/L    Glucose 97 70 - 99 mg/dL    Urea Nitrogen 30 7 - 30 mg/dL    Creatinine 1.14 (H) 0.52 - 1.04 mg/dL    GFR Estimate 45 (L) >60 mL/min/1.7m2    GFR Estimate If Black 54 (L) >60 mL/min/1.7m2    Calcium 8.8 8.5 - 10.1 mg/dL   CBC with platelets   Result Value Ref Range    WBC 20.8 (H) 4.0 - 11.0 10e9/L    RBC Count 3.60 (L) 3.8 - 5.2 10e12/L    Hemoglobin 11.8 11.7 - 15.7 g/dL    Hematocrit 35.0 35.0 - 47.0 %    MCV 97 78 - 100 fl    MCH 32.8 26.5 - 33.0 pg    MCHC 33.7 31.5 - 36.5 g/dL    RDW 16.4 (H) 10.0 - 15.0 %    Platelet Count 148 (L) 150 - 450 10e9/L   Procalcitonin   Result Value Ref Range    Procalcitonin 1.09 ng/ml   Lactic acid level STAT   Result Value Ref Range    Lactic Acid 1.5 0.7 - 2.1 mmol/L

## 2017-04-24 NOTE — CONSULTS
[facility]  Antimicrobial Stewardship Team (AST) Note   Antimicrobial Stewardship Program Clinical Note - a joint venture between [facility] and [facility] to optimize antibiotic management.      Allergies: Amiodarone    Brief Summary: 88 year old female with complex PMHx including severe valvular disease (moderate MR, moderate TR and mod-severe AS), hypertension, dyslipidemia, paroxsymal afib with hx of AVN ablation on chronic anticoagulation, sick sinus syndrome s/p ppm, chronic diastolic CHF and carotid artery stenosis who was admitted on 4/21/2017 with acute onset shortness of breath suspected secondary to flash pulmonary edema. She was initially requiring BiPAP and [place] [facility] [place]     Note she was recently hospitalized here from 4/17/17 - 4/19/17 for evaluation of syncope. Workup included a repeat echocardiogram with findings as summarized above. Her medications were adjusted (Toprol XL dc'd and started on amlodipine 5mg daily). She had no s/sx of fluid overload during that stay    Acute Hypoxic Respiratory Failure dt flash pulmonary edema in the setting of severe valvular disease:  Acute Exacerbation of Chronic Diastolic CHF:   Echocardiogram on 4/17 showed EF 60-65%, 2+MR, 2+TR and mod AS as well as elevated RVSP c/w severe pulmonary hypertension.  She developed acute onset after she went out to dinner with friends and became acutely short of breath after returning home. Typically not O2 dependent. O2 sats were as low as 70s when EMS arrived. In ED, CXR showed some hazy densities bilaterally. CT chest was neg for PE, showed extensive ground glass infiltrate throughout the lungs and some consolidative changes in the lower lobes (L worse than R). ProBNP 1900. Trop 0.075. WBC 17.6. Procalcitonin 0.13 (low risk of systemic bacterial infection). Lactate 3.4. Placed on BIPAP in the ED. Also started on Vancomycin and Zosyn given recent  hospitalization. Initially placed on nitro gtt but this was quickly weaned while she was in the ED  -- acute onset of respiratory symptoms more suggestive of flash pulmonary edema than pneumonia, no reported cough/fever prior to admission  -- cont diuresis with Lasix 40mg IV BID  -- Vanco and Zosyn continued on admission; leukocytosis has been variable (17-> 26 ->17->20), remains afebrile, interestingly procalcitonin continues to trend up, serial lactates have remain nl -> cont IV antibiotics for now, monitor serial procalcitonins  -- weaned off BiPAP and has remained on high flow NC overnight (30L 100% FiO2) -> wean as able  -- cardiology following, assistance greatly appreciated    Assessment:  Recommend discontinue vancomycin as no resistant gram positive organisms isolated.        Current Anti-infective Orders:  Vanco IV (4/21- ) + Zosyn (4/21- )      Clinical Features/Vital Signs  Vital signs:  Tmax - afebrile      Lab Results  WBC 4/21 = 17.6, 4/22 = 26.1, 4/23 = 17.6, 4/24 = 20.8  Procal 4/21 = 0.13, 4/22 = 0.28, 4/23 = 0.48, 4/24 = 1.09    Culture Results -  4/21 Blood x 2 : NGTD    Imaging - past 3 days:  Xr Chest Port 1 View    Result Date: 4/21/2017  CHEST ONE VIEW UPRIGHT 4/21/2017 7:39 PM HISTORY: Shortness of breath.  COMPARISON: 4/17/2017.     IMPRESSION: Increasing hazy densities bilaterally, may be infection and/or edema. Trace pleural fluid on the right appears similar. MANJIT CONSTANTINO MD    Ct Chest Pulmonary Embolism W Contrast    Result Date: 4/21/2017  CT CHEST AND PULMONARY EMBOLISM ANGIOGRAM  4/21/2017 8:01 PM HISTORY: Dyspnea. COMPARISON: None. TECHNIQUE: Volumetric helical acquisition of CT images of the chest from the lung apices to the kidneys were acquired after the administration of 53mL Isovue-370  IV contrast. Radiation dose for this scan was reduced using automated exposure control, adjustment of the mA and/or kV according to patient size, or iterative reconstruction technique.  FINDINGS: There is no pulmonary embolism. Extensive groundglass infiltrates throughout all lungs. Consolidative changes seen in both lower lobes left worse than right. Minimal right pleural effusion. Patchy consolidative infiltrates seen in the remainder of the lungs. The heart is not enlarged. There are moderate coronary vascular calcifications consistent with coronary artery disease. Thoracic aorta is atherosclerotic without evidence of aneurysm. Contrast bolus is limiting for evaluation of dissection. No gross dissection demonstrated. There is no pleural or pericardial effusion.  There is no pneumothorax. Adrenal glands are normal. Remainder of the visualized upper abdomen is unremarkable.     IMPRESSION: 1. No pulmonary embolism demonstrated. 2. No thoracic aortic aneurysm. 3. Extensive bilateral pneumonia. MANJIT CONSTANTINO MD          Recommendations/Interventions:  D/C vancomycin    Discussed with ID Staff -

## 2017-04-24 NOTE — PLAN OF CARE
Problem: Acute Respiratory Distress Syndrome (Adult)  Goal: Signs and Symptoms of Listed Potential Problems Will be Absent or Manageable (Acute Respiratory Distress Syndrome)  Signs and symptoms of listed potential problems will be absent or manageable by discharge/transition of care (reference Acute Respiratory Distress Syndrome (Adult) CPG).   Outcome: No Change  Uneventful shift with patient tolerating Hi-Flow nasal cannula at 100% and saturating between 92 - 96%.  BP's low at one point during night and NTG drip turned off.  NTG drip restarted at maintenance dose when BP's returned manageable level.  Patient does not complain of upper chest pain.  Lungs are decreased.  Weight is down some, but not greater than 1 pound.  Continues to have occasional bloody sputum, but very minimal.

## 2017-04-24 NOTE — PLAN OF CARE
Problem: Individualization  Goal: Patient Preferences  A&OX4, prn Tylenol given for c/o low back pain with good effect. Had no c/o chest pain and Nitro gtt d/c'd by cardiology. Up with assist to 1 to BSC and chair. Potassium 3.3 and dr. Amaro was called and x1 dose of potasium to be given by evening RN. Seen by pallitative care team. Plan for left heart cath when respiratory status is stable.

## 2017-04-24 NOTE — PROGRESS NOTES
SPIRITUAL HEALTH SERVICES Progress Note  [facility]    Initial visit per staff request.  Pt asked staff to call her Rastafarian to request a pastoral visit.  SH called her Rastafarian (Nanci Hamm: [phone]) and was told that a  will visit pt either today or tomorrow.   SH reported this to pt (who was visiting with her niece).  Pt said a pastoral visit is important because it may be the last that occurs prior to her death.    Pt shared that she is likely to enroll in Hospice; and then began talking about her  (who  several years ago) and their Rastafarian.  Pt's stories included that she and her  were very involved in the music ministry (choirs) of [place] and still love the rich and excellent music tradition there.  Pt clearly values and trusts the pastoral/spiritual care provided by her Rastafarian; and cites no other emotional/spiritual concerns.                                                                                                                                             Fran Colmenares M.Div., [facility]  Staff   Pager [phone]

## 2017-04-25 NOTE — PROGRESS NOTES
Pt on HFNC 30L 70% throughout the night with SpO2 in the low to mid 90's.LS fine crackles.Will cont to monitor.  4/25/2017  Natalie Hamm

## 2017-04-25 NOTE — PLAN OF CARE
Problem: Goal Outcome Summary  Goal: Goal Outcome Summary  Outcome: Improving  Vitals stable, 96% on highflow at 60%.  Up with assist of one, denies pain.  Possible right and left heart cath tomorrow for TAVR workup.  Continue to monitor.

## 2017-04-25 NOTE — PROGRESS NOTES
[facility]  Hospitalist Progress Note  Sumanth Rincon MD  04/25/2017    Assessment & Plan    Rebecca Helms is a 88 year old female with complex PMHx including severe valvular disease (moderate MR, moderate TR and mod-severe AS), hypertension, dyslipidemia, paroxsymal afib with hx of AVN ablation on chronic anticoagulation, sick sinus syndrome s/p ppm, chronic diastolic CHF and carotid artery stenosis who was admitted on 4/21/2017 with acute onset shortness of breath suspected secondary to flash pulmonary edema. She was initially requiring BiPAP and [place] [facility] [place]     Note she was recently hospitalized here from 4/17/17 - 4/19/17 for evaluation of syncope. Workup included a repeat echocardiogram with findings as summarized above. Her medications were adjusted (Toprol XL dc'd and started on amlodipine 5mg daily). She had no s/sx of fluid overload during that stay.      Acute Hypoxic Respiratory Failure dt flash pulmonary edema in the setting of severe valvular disease:  Acute Exacerbation of Chronic Diastolic CHF:   Echocardiogram on 4/17 showed EF 60-65%, 2+MR, 2+TR and mod AS as well as elevated RVSP c/w severe pulmonary hypertension.  She developed acute onset after she went out to dinner with friends and became acutely short of breath after returning home. Typically not O2 dependent. O2 sats were as low as 70s when EMS arrived. In ED, CXR showed some hazy densities bilaterally. CT chest was neg for PE, showed extensive ground glass infiltrate throughout the lungs and some consolidative changes in the lower lobes (L worse than R). ProBNP 1900. Trop 0.075. WBC 17.6. Procalcitonin 0.13 (low risk of systemic bacterial infection). Lactate 3.4. Placed on BIPAP in the ED. Also started on Vancomycin and Zosyn given recent hospitalization. Initially placed on nitro gtt but this was quickly weaned while she was in the ED  -- acute onset of respiratory symptoms more suggestive of flash  pulmonary edema than pneumonia, no reported cough/fever prior to admission  -- cont diuresis with Lasix 40mg IV BID, net -3.5L  -- Vanco and Zosyn continued on admission; leukocytosis has been variable (17-> 26 ->17->20), remains afebrile, interestingly procalcitonin continues to trend up, serial lactates have remain nl -> cont IV antibiotics for now, monitor serial procalcitonins  -- weaned off BiPAP and has remained on high flow    -- cardiology following, assistance greatly appreciated  -- plan for LHC/RHC     Elevated troponin dt demand ischemia:  Suspect demand ischemia in the setting of acute hypoxic respiratory failure.   -- serial troponins remained relatively flat (0.075 -> 0.142 -> 0.084)  -- hold off on repeating echocardiogram as she just had one on 4/17/17  -- plan for ischemic workup when resp status better.     Paroxysmal Afib with hx of AV Node Ablation  Sick Sinus Syndrome s/p ppm:  Remains paced.   Anticoagulated with Eliquis -> held 4/23 dt reported hemoptysis,  Toprol XL dc'd during recent hospital stay, no issues with rate control thus far     Hypertension:  Meds adjusted during recent hospital stay -> Toprol XL dc'd and was started on amlodipine 10mg daily though I see this was on hold per [facility] as of 4/20  -- cont lisinopril 20mg daily and amlodipine 10mg daily, BPs remain stable     Dyslipidemia:  Chronic and stable on statin. Can resume once off BiPAP.     Stage III CKD:  Baseline Cr seems to be 1.1-1.2.   -- monitor renal function while diuresing, Cr remains stable thus far     Glaucoma:  Cont home eye gtts.     Anemia with episode of hemoptysis:  -- had episode of hemoptysis on 4/22 PM - 4/23 AM  -- hgb initially trended down: 13.6 on 4/22 -> 11.3 on 4/23 but has since remained stable around 11  -- ?due in part to pulmonary edema  -- Eliquis held 4/23, hemoptysis improved and hgb stable today so should be okay to resume anticoagulation after heart cath studies.     FEN: no IVFs,  "lytes stable, cardiac/low Na diet    DVT Prophylaxis: anticoagulated on Eliquis    Code Status: DNR/DNI     Disposition:  > 2 days    Interval History   - chart reviewed  - patient known to me at time of admission  - noted good diuresis  - she is on high flow at 8L      -Data reviewed today: I reviewed all new labs and imaging over the last 24 hours. I personally reviewed no images or EKG's today.    Physical Exam   Heart Rate: 75, Blood pressure (!) 122/98, pulse 96, temperature 97.9  F (36.6  C), temperature source Oral, resp. rate 14, height 1.6 m (5' 3\"), weight 51.7 kg (113 lb 14.4 oz), SpO2 93 %.  Vitals:    04/22/17 0100 04/23/17 0420 04/24/17 0500   Weight: 51.4 kg (113 lb 5.1 oz) 51.7 kg (114 lb) 51.7 kg (113 lb 14.4 oz)     Vital Signs with Ranges  Temp:  [97.6  F (36.4  C)-98.6  F (37  C)] 97.9  F (36.6  C)  Pulse:  [96] 96  Heart Rate:  [69-75] 75  Resp:  [14-33] 14  BP: ()/(53-98) 122/98  FiO2 (%):  [50 %-80 %] 80 %  SpO2:  [88 %-97 %] 93 %  I/O's Last 24 hours  I/O last 3 completed shifts:  In: 480 [P.O.:480]  Out: 950 [Urine:950]    Constitutional: Awake, alert, cooperative, no apparent distress  Respiratory: ronchi  Bilaterally   Cardiovascular: Regular rate and rhythm, normal S1 and S2, + murmur noted  GI: Normal bowel sounds, soft, non-distended, non-tender  Skin/Integumen: No rashes, no cyanosis, no edema  Other:      Medications   All medications were reviewed.    - MEDICATION INSTRUCTIONS -       - MEDICATION INSTRUCTIONS -         furosemide  20 mg Intravenous BID     metoprolol  25 mg Oral BID     amLODIPine  5 mg Oral Daily     piperacillin-tazobactam  3.375 g Intravenous Q6H     sodium chloride (PF)  3 mL Intracatheter Q8H     timolol  1 drop Both Eyes BID     multivitamin, therapeutic with minerals  1 tablet Oral Daily     lisinopril  20 mg Oral Daily     latanoprost  1 drop Both Eyes At Bedtime     brimonidine  1 drop Both Eyes BID     vancomycin (VANCOCIN) IV  1,250 mg Intravenous " Q48H        Data     Recent Labs  Lab 04/25/17  1254 04/25/17  0525 04/24/17  0628 04/23/17  0530 04/22/17  1252 04/22/17  0526 04/21/17 2035 04/21/17 1928   WBC  --  15.6* 20.8* 17.6*  --  26.1*  --  17.6*   HGB  --  12.0 11.8 11.3*  --  13.6  --  16.7*   MCV  --  97 97 98  --  98  --  100   PLT  --  174 148* 148*  --  197  --  246   INR  --   --   --   --   --   --  1.16* Canceled, Test credited Unsatisfactory specimen - hemolyzed Notification of test cancellation was given toER via trackboard at 2020.SL   NA  --  136 137 141  --  140 139 Canceled, Test credited Unsatisfactory specimen - hemolyzed Notification of test cancellation was given toER via trackboard at 2020.SL   POTASSIUM 4.0 3.1* 3.3* 3.5  --  4.1 4.0 Canceled, Test credited Unsatisfactory specimen - hemolyzed Notification of test cancellation was given toER via trackboard at 2020.SL   CHLORIDE  --  100 100 104  --  107 107 Canceled, Test credited Unsatisfactory specimen - hemolyzed Notification of test cancellation was given toER via trackboard at 2020.SL   CO2  --  26 27 28  --  23 23 Canceled, Test credited Unsatisfactory specimen - hemolyzed Notification of test cancellation was given toER via trackboard at 2020.SL   BUN  --  32* 30 31*  --  37* 32* Canceled, Test credited Unsatisfactory specimen - hemolyzed Notification of test cancellation was given toER via trackboard at 2020.SL   CR  --  1.05* 1.14* 1.11*  --  1.07* 1.34* Canceled, Test credited Unsatisfactory specimen - hemolyzed Notification of test cancellation was given toER via trackboard at 2020.SL   ANIONGAP  --  10 10 9  --  10 9 Canceled, Test credited Unsatisfactory specimen - hemolyzed Notification of test cancellation was given toER via trackboard at 2020.SL   CARLO  --  8.9 8.8 8.5  --  8.7 7.9* Canceled, Test credited Unsatisfactory specimen - hemolyzed Notification of test cancellation was given toER via trackboard at 2020.SL   GLC  --  101* 97 111*  --  125* 152* Canceled, Test  credited Unsatisfactory specimen - hemolyzed Notification of test cancellation was given toER via trackboard at 2020.SL   ALBUMIN  --   --   --   --   --   --  2.9* Canceled, Test credited Unsatisfactory specimen - hemolyzed Notification of test cancellation was given toER via trackboard at 2020.SL   PROTTOTAL  --   --   --   --   --   --  6.3* Canceled, Test credited Unsatisfactory specimen - hemolyzed Notification of test cancellation was given toER via trackboard at 2020.SL   BILITOTAL  --   --   --   --   --   --  0.7 Canceled, Test credited Unsatisfactory specimen - hemolyzed Notification of test cancellation was given toER via trackboard at 2020.SL   ALKPHOS  --   --   --   --   --   --  121 Canceled, Test credited Unsatisfactory specimen - hemolyzed Notification of test cancellation was given toER via trackboard at 2020.SL   ALT  --   --   --   --   --   --  34 Canceled, Test credited Unsatisfactory specimen - hemolyzed Notification of test cancellation was given toER via trackboard at 2020.SL   AST  --   --   --   --   --   --  45 Canceled, Test credited Unsatisfactory specimen - hemolyzed Notification of test cancellation was given toER via trackboard at 2020.SL   TROPI  --   --   --   --  0.084* 0.142* 0.075* Canceled, Test credited Unsatisfactory specimen - hemolyzed Notification of test cancellation was given toER via trackboard at 2020.SL       No results found for this or any previous visit (from the past 24 hour(s)).    Sumanth Rincon MD  Pager [phone]

## 2017-04-25 NOTE — PLAN OF CARE
Problem: Goal Outcome Summary  Goal: Goal Outcome Summary  Outcome: Improving  From 100% to 50% Hi flow this shift. Up in chair x6 hrs. Continue diuresing and ABX

## 2017-04-25 NOTE — PLAN OF CARE
Problem: Goal Outcome Summary  Goal: Goal Outcome Summary  Outcome: No Change  BP stable overnight, tolerating high flow O2 between 50-80% and saturation fluctuates between 88-94%. Sats do drop when sleeping at times d/t mouth breathing on highflo. Up to chair 3 hours. Discussing possible RH and LH cath. Tele paced rhythm. Continue to monitor.

## 2017-04-25 NOTE — PROVIDER NOTIFICATION
Brief update:    Ongoing hypokalemia in the setting of ongoing diuresis    Potassium replacement protocol ordered.    Raul Crum MD  6:29 AM

## 2017-04-25 NOTE — PROGRESS NOTES
Assessment and Plan:     1. Acute on chronic dyspnea. Findings most consistent with sudden-onset pulmonary edema, less likely pneumonia.   2. Acute on chronic HFpEF. Diuresis continues with good results. Stable renal function. Slow improvement.  3. Less likely pneumonia, although she has an intermediate level procalcitonin and marked leukocytosis. She also has hemoptysis, with greater than silver dollar size, bright red blood spots of blood with coughing. A chemical pneumonitis is also possible due to aspiration of this blood. Eliquis has been stopped. Hemoptysis continues but amount seems to be less. WBC improved, procalcitonin down today.   4. Aortic stenosis. Probably moderate in severity. Mean gradient only 14 mmHg, CHUCK 1.2, with low stroke volume, moderate LVH.  5. Moderately severe MR. Etiology unclear  6. Moderately severe TR.  7. Severe pulmonary hypertension, PA systolic pressure likely in the 60's-70's.  8. 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 Initially seemed helpful to have consistent HR control with episodes of afib, but obviously did not prevent this epidsode.  9. HTN with labile BP's. Recent admission for near syncope, with lowered doses of BP meds. BP still low now at times.  10. Elevated troponin. Likely due to acute HF episode. CAD has not been exluded.    Repeat chest xray today to reassess pulmonary status.     Hold off on heart cath today. She is still a bit tenuous with her breathing and oxygen levels. She wants to wait until tomorrow which seems reasonable.     BP/HR look good now, continue gentle diuresis.     On ATBx for pneumonia. Continue to hold Eliquis/heparin due to hemoptysis.     Luis White MD  Cardiology.          Interval History:   Still dyspneic with any movement. Feels comfortable at rest. Less neck/throat pain. Still with occasional cough but she feels less chest congestion. A few spots of hemoptysis but smaller than yesterday.          Medications:       furosemide   "20 mg Intravenous BID     metoprolol  25 mg Oral BID     amLODIPine  5 mg Oral Daily     piperacillin-tazobactam  3.375 g Intravenous Q6H     sodium chloride (PF)  3 mL Intracatheter Q8H     timolol  1 drop Both Eyes BID     multivitamin, therapeutic with minerals  1 tablet Oral Daily     lisinopril  20 mg Oral Daily     latanoprost  1 drop Both Eyes At Bedtime     brimonidine  1 drop Both Eyes BID     vancomycin (VANCOCIN) IV  1,250 mg Intravenous Q48H            Physical Exam:   Blood pressure (!) 124/93, pulse 96, temperature 98.3  F (36.8  C), temperature source Oral, resp. rate 16, height 1.6 m (5' 3\"), weight 51.7 kg (113 lb 14.4 oz), SpO2 93 %.    Vitals:    04/22/17 0100 04/23/17 0420 04/24/17 0500   Weight: 51.4 kg (113 lb 5.1 oz) 51.7 kg (114 lb) 51.7 kg (113 lb 14.4 oz)         Intake/Output Summary (Last 24 hours) at 04/24/17 0948  Last data filed at 04/24/17 0600   Gross per 24 hour   Intake           435.66 ml   Output             1400 ml   Net          -964.34 ml   .    Exam:  GENERAL APPEARANCE ADULT: Alert, in no acute distress  RESP: rhonchi mild, no respiratory distress  CV: regular rate with systolic murmur  ABDOMEN: normal bowel sounds, soft, nontender, no hepatosplenomegaly or other masses  EXTREMITIES: No edema         Data:   LABS (Last four results)  CMP    Recent Labs  Lab 04/25/17  0525 04/24/17  0628 04/23/17  0530 04/22/17  0526 04/21/17 2035 04/21/17 1928    137 141 140 139 Canceled, Test credited Unsatisfactory specimen - hemolyzed Notification of test cancellation was given toER via trackboard at 2020.SL   POTASSIUM 3.1* 3.3* 3.5 4.1 4.0 Canceled, Test credited Unsatisfactory specimen - hemolyzed Notification of test cancellation was given toER via trackboard at 2020.SL   CHLORIDE 100 100 104 107 107 Canceled, Test credited Unsatisfactory specimen - hemolyzed Notification of test cancellation was given toER via trackboard at 2020.SL   CO2 26 27 28 23 23 Canceled, Test credited " Unsatisfactory specimen - hemolyzed Notification of test cancellation was given toER via trackboard at 2020.SL   ANIONGAP 10 10 9 10 9 Canceled, Test credited Unsatisfactory specimen - hemolyzed Notification of test cancellation was given toER via trackboard at 2020.SL   * 97 111* 125* 152* Canceled, Test credited Unsatisfactory specimen - hemolyzed Notification of test cancellation was given toER via trackboard at 2020.SL   BUN 32* 30 31* 37* 32* Canceled, Test credited Unsatisfactory specimen - hemolyzed Notification of test cancellation was given toER via trackboard at 2020.SL   CR 1.05* 1.14* 1.11* 1.07* 1.34* Canceled, Test credited Unsatisfactory specimen - hemolyzed Notification of test cancellation was given toER via trackboard at 2020.SL   GFRESTIMATED 49* 45* 46* 48* 37* Canceled, Test credited Unsatisfactory specimen - hemolyzed Notification of test cancellation was given toER via trackboard at 2020.SL   GFRESTBLACK 60* 54* 56* 58* 45* Canceled, Test credited Unsatisfactory specimen - hemolyzed Notification of test cancellation was given toER via trackboard at 2020.SL   CARLO 8.9 8.8 8.5 8.7 7.9* Canceled, Test credited Unsatisfactory specimen - hemolyzed Notification of test cancellation was given toER via trackboard at 2020.SL   PROTTOTAL  --   --   --   --  6.3* Canceled, Test credited Unsatisfactory specimen - hemolyzed Notification of test cancellation was given toER via trackboard at 2020.SL   ALBUMIN  --   --   --   --  2.9* Canceled, Test credited Unsatisfactory specimen - hemolyzed Notification of test cancellation was given toER via trackboard at 2020.SL   BILITOTAL  --   --   --   --  0.7 Canceled, Test credited Unsatisfactory specimen - hemolyzed Notification of test cancellation was given toER via trackboard at 2020.SL   ALKPHOS  --   --   --   --  121 Canceled, Test credited Unsatisfactory specimen - hemolyzed Notification of test cancellation was given toER via trackboard at 2020.SL    AST  --   --   --   --  45 Canceled, Test credited Unsatisfactory specimen - hemolyzed Notification of test cancellation was given toER via trackboard at 2020.SL   ALT  --   --   --   --  34 Canceled, Test credited Unsatisfactory specimen - hemolyzed Notification of test cancellation was given toER via trackboard at 2020.SL     CBC    Recent Labs  Lab 04/25/17  0525 04/24/17  0628 04/23/17  0530 04/22/17  0526   WBC 15.6* 20.8* 17.6* 26.1*   RBC 3.64* 3.60* 3.52* 4.07   HGB 12.0 11.8 11.3* 13.6   HCT 35.3 35.0 34.4* 39.7   MCV 97 97 98 98   MCH 33.0 32.8 32.1 33.4*   MCHC 34.0 33.7 32.8 34.3   RDW 16.2* 16.4* 16.8* 16.3*    148* 148* 197     INR    Recent Labs  Lab 04/21/17 2035 04/21/17  1928   INR 1.16* Canceled, Test credited Unsatisfactory specimen - hemolyzed Notification of test cancellation was given toER via trackboard at 2020.SL     TROPONINS   Lab Results   Component Value Date    TROPI 0.084 (H) 04/22/2017    TROPI 0.142 (HH) 04/22/2017    TROPI 0.075 (H) 04/21/2017    TROPI  04/21/2017     Canceled, Test credited   Unsatisfactory specimen - hemolyzed   Notification of test cancellation was given to  ER via trackboard at 2020.      TROPI 0.035 04/18/2017                                                                                                               YOKO HARRELL MD

## 2017-04-26 NOTE — PROGRESS NOTES
Care Transition Initial Assessment -   Reason For Consult: discharge planning  Met with: Family, daughter Lainey  Active Problems:    Acute respiratory failure (H)         DATA  Lives With: alone  Living Arrangements: apartment  Description of Support System: Supportive, Involved  Who is your support system?: Children (although dtr lives out of state)     Identified issues/concerns regarding health management:       Per social service protocol for discharge planning.  Patient was admitted on 4-21-17 with acute respiratory failure.  The tentative date of discharge is yet to be determined.  Reviewed chart and spoke with patient's daughter regarding discharge plans.  Per patient's daughter report, patient lives alone in a senior apartment at [address] [place] [address].  Patient is open to [facility] for nursing.  Patient's daughter is concerned that patient has had multiple hospital admissions and does not have enough support at home.  She is interested in having patient go to tcu on discharge.  The first choice [facility] and the second choice is [facility].  Referrals sent, via discharge on the double to check bed availability.  Of note, patient's daughter lives out of state.  Patient has a niece who lives in town.      ASSESSMENT  Cognitive Status:  Unable to assess as spoke with patient's daughter over the phone.  Concerns to be addressed: discharge planning.     PLAN  Financial costs for the patient includes N/A.  Patient given options and choices for discharge tcu choices.  Patient/family is agreeable to the plan?  Yes  Patient Goals and Preferences: tcu on discharge.  Patient anticipates discharging to:  TCU on discharge.    Will confirm a bed, continue to follow, and assist with a safe discharge plan.      JENNIFER Salomon, [facility]  [phone]

## 2017-04-26 NOTE — PLAN OF CARE
Problem: Goal Outcome Summary  Goal: Goal Outcome Summary  Outcome: No Change  VSS. Tylenol given for low back pain. Pt. Remains on high flow O2 60%, 40 L with stable O2 sats.  Continue to monitor and observe closely.

## 2017-04-26 NOTE — PLAN OF CARE
Problem: Goal Outcome Summary  Goal: Goal Outcome Summary  Outcome: Improving  Pt on High-flow 60% throughout shift, had intermittent dips to mid 80's when transferring to commode and sleeping but rebounded quickly.  VSS.  Tele : V-paced.  Lungs diminished throughout w/ crackles in bases.  Tylenol and hot packs effective for back pain.  Up to BSC w/ 1 assist, small loose BM overnight.

## 2017-04-26 NOTE — PROGRESS NOTES
Assessment and Plan:     1. Acute on chronic dyspnea. Findings most consistent with sudden-onset pulmonary edema, less likely pneumonia.   2. Acute on chronic HFpEF. Diuresis continues with good results, down 4 L since admission with minimal change in daily weight. Stable renal function. Still requiring high-flow O2 with episodes of desaturation with activity.   3. Possible pneumonia: significant areas of consolidation in upper, posterior lung zones on CT, intermediate level procalcitonin and marked leukocytosis. She also has hemoptysis, with greater than silver dollar size, bright red blood spots of blood with coughing. A chemical pneumonitis is possible due to aspiration of this blood. Eliquis has been stopped. Hemoptysis continues to diminish. WBC improved steadily, down from 21 to 12 today. Procalcitonin was declining after admission. She continues on empiric antibiotics for institutionally acquired pneumonia.  4. Aortic stenosis. Probably moderate in severity. Mean gradient only 14 mmHg, CHUCK 1.2, with low stroke volume, moderate LVH.  5. Moderately severe MR. Etiology unclear  6. Moderately severe TR.  7. Severe pulmonary hypertension, PA systolic pressure likely in the 60's-70's.  8. 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 Initially seemed helpful to have consistent HR control with episodes of afib, but obviously did not prevent this epidsode.  9. HTN with labile BP's. Recent admission for near syncope, with lowered doses of BP meds. BP consistent and controlled now. .  10. Elevated troponin. Likely due to acute HF episode. CAD has not been exluded.    Chest xray not done yesterday, ordered for today. I am concerned that she is not responding well to treatment and continues to have significant oxygen demands. This may be a consequence of significant lung injury due to viral or bacterial pneumonia or chemical pneumonitis. Or maybe the HF or pulmonary hypertension are playing a more significant role. I  "would like to do R and L heart cath to sort out the HF vs. pneumonia issue, but she would not tolerate that well yet.     Hold off on heart cath today. She is still a bit tenuous with her breathing and oxygen levels.     BP/HR look good now, continue gentle diuresis.     Continue to hold Eliquis/heparin due to hemoptysis.     Will discuss with Dr. Rincon. She might benefit from a pulmonology evaluation.     Luis White MD  Cardiology.          Interval History:   Still dyspneic with any movement. Feels comfortable at rest. Less hemoptysis than yesterday.          Medications:       furosemide  20 mg Intravenous BID     metoprolol  25 mg Oral BID     amLODIPine  5 mg Oral Daily     piperacillin-tazobactam  3.375 g Intravenous Q6H     sodium chloride (PF)  3 mL Intracatheter Q8H     timolol  1 drop Both Eyes BID     multivitamin, therapeutic with minerals  1 tablet Oral Daily     lisinopril  20 mg Oral Daily     latanoprost  1 drop Both Eyes At Bedtime     brimonidine  1 drop Both Eyes BID     vancomycin (VANCOCIN) IV  1,250 mg Intravenous Q48H            Physical Exam:   Blood pressure 139/73, pulse 96, temperature 98.6  F (37  C), temperature source Axillary, resp. rate 20, height 1.6 m (5' 3\"), weight 52.2 kg (115 lb 1.3 oz), SpO2 90 %.    Vitals:    04/23/17 0420 04/24/17 0500 04/26/17 0637   Weight: 51.7 kg (114 lb) 51.7 kg (113 lb 14.4 oz) 52.2 kg (115 lb 1.3 oz)         Intake/Output Summary (Last 24 hours) at 04/24/17 0948  Last data filed at 04/24/17 0600   Gross per 24 hour   Intake           435.66 ml   Output             1400 ml   Net          -964.34 ml   .    Exam:  GENERAL APPEARANCE ADULT: Alert, in no acute distress  RESP: rhonchi mild, no respiratory distress  CV: regular rate with systolic murmur  ABDOMEN: normal bowel sounds, soft, nontender, no hepatosplenomegaly or other masses  EXTREMITIES: No edema         Data:   LABS (Last four results)  CMP    Recent Labs  Lab 04/26/17  0534 04/25/17  1254 " 04/25/17  0525 04/24/17  0628 04/23/17  0530  04/21/17 2035 04/21/17 1928     --  136 137 141  < > 139 Canceled, Test credited Unsatisfactory specimen - hemolyzed Notification of test cancellation was given toER via trackboard at 2020.SL   POTASSIUM 3.8 4.0 3.1* 3.3* 3.5  < > 4.0 Canceled, Test credited Unsatisfactory specimen - hemolyzed Notification of test cancellation was given toER via trackboard at 2020.SL   CHLORIDE 103  --  100 100 104  < > 107 Canceled, Test credited Unsatisfactory specimen - hemolyzed Notification of test cancellation was given toER via trackboard at 2020.SL   CO2 27  --  26 27 28  < > 23 Canceled, Test credited Unsatisfactory specimen - hemolyzed Notification of test cancellation was given toER via trackboard at 2020.SL   ANIONGAP 8  --  10 10 9  < > 9 Canceled, Test credited Unsatisfactory specimen - hemolyzed Notification of test cancellation was given toER via trackboard at 2020.SL   GLC 88  --  101* 97 111*  < > 152* Canceled, Test credited Unsatisfactory specimen - hemolyzed Notification of test cancellation was given toER via trackboard at 2020.SL   BUN 31*  --  32* 30 31*  < > 32* Canceled, Test credited Unsatisfactory specimen - hemolyzed Notification of test cancellation was given toER via trackboard at 2020.SL   CR 1.06*  --  1.05* 1.14* 1.11*  < > 1.34* Canceled, Test credited Unsatisfactory specimen - hemolyzed Notification of test cancellation was given toER via trackboard at 2020.SL   GFRESTIMATED 49*  --  49* 45* 46*  < > 37* Canceled, Test credited Unsatisfactory specimen - hemolyzed Notification of test cancellation was given toER via trackboard at 2020.SL   GFRESTBLACK 59*  --  60* 54* 56*  < > 45* Canceled, Test credited Unsatisfactory specimen - hemolyzed Notification of test cancellation was given toER via trackboard at 2020.SL   CARLO 9.2  --  8.9 8.8 8.5  < > 7.9* Canceled, Test credited Unsatisfactory specimen - hemolyzed Notification of test cancellation was  given toER via trackboard at 2020.SL   MAG  --  2.2  --   --   --   --   --   --    PROTTOTAL  --   --   --   --   --   --  6.3* Canceled, Test credited Unsatisfactory specimen - hemolyzed Notification of test cancellation was given toER via trackboard at 2020.SL   ALBUMIN  --   --   --   --   --   --  2.9* Canceled, Test credited Unsatisfactory specimen - hemolyzed Notification of test cancellation was given toER via trackboard at 2020.SL   BILITOTAL  --   --   --   --   --   --  0.7 Canceled, Test credited Unsatisfactory specimen - hemolyzed Notification of test cancellation was given toER via trackboard at 2020.SL   ALKPHOS  --   --   --   --   --   --  121 Canceled, Test credited Unsatisfactory specimen - hemolyzed Notification of test cancellation was given toER via trackboard at 2020.SL   AST  --   --   --   --   --   --  45 Canceled, Test credited Unsatisfactory specimen - hemolyzed Notification of test cancellation was given toER via trackboard at 2020.SL   ALT  --   --   --   --   --   --  34 Canceled, Test credited Unsatisfactory specimen - hemolyzed Notification of test cancellation was given toER via trackboard at 2020.SL   < > = values in this interval not displayed.  CBC    Recent Labs  Lab 04/26/17  0534 04/25/17  0525 04/24/17  0628 04/23/17  0530   WBC 12.6* 15.6* 20.8* 17.6*   RBC 3.59* 3.64* 3.60* 3.52*   HGB 11.8 12.0 11.8 11.3*   HCT 35.1 35.3 35.0 34.4*   MCV 98 97 97 98   MCH 32.9 33.0 32.8 32.1   MCHC 33.6 34.0 33.7 32.8   RDW 16.0* 16.2* 16.4* 16.8*    174 148* 148*     INR    Recent Labs  Lab 04/21/17 2035 04/21/17 1928   INR 1.16* Canceled, Test credited Unsatisfactory specimen - hemolyzed Notification of test cancellation was given toER via trackboard at 2020.SL     TROPONINS   Lab Results   Component Value Date    TROPI 0.084 (H) 04/22/2017    TROPI 0.142 (HH) 04/22/2017    TROPI 0.075 (H) 04/21/2017    TROPI  04/21/2017     Canceled, Test credited   Unsatisfactory specimen -  hemolyzed   Notification of test cancellation was given to  ER via trackboard at 2020.SL      TROPI 0.035 04/18/2017                                                                                                               YOKO HARRELL MD

## 2017-04-26 NOTE — PROGRESS NOTES
[facility]  Hospitalist Progress Note  Sumanth Rincon MD  04/26/2017    Assessment & Plan    Rebecca Helms is a 88 year old female with complex PMHx including severe valvular disease (moderate MR, moderate TR and mod-severe AS), hypertension, dyslipidemia, paroxsymal afib with hx of AVN ablation on chronic anticoagulation, sick sinus syndrome s/p ppm, chronic diastolic CHF and carotid artery stenosis who was admitted on 4/21/2017 with acute onset shortness of breath suspected secondary to flash pulmonary edema. She was initially requiring BiPAP and was [place] the [facility] [place]     Note she was recently hospitalized here from 4/17/17 - 4/19/17 for evaluation of syncope. Workup included a repeat echocardiogram with findings as summarized above. Her medications were adjusted (Toprol XL dc'd and started on amlodipine 5mg daily). She had no s/sx of fluid overload during that stay.      Acute Hypoxic Respiratory Failure, likely multifactorial with flash pulmonary edema and consolidative pneumonia and hemoptysis  in the setting of severe valvular disease   Acute Exacerbation of Chronic Diastolic CHF:   Echocardiogram on 4/17 showed EF 60-65%, 2+MR, 2+TR and mod AS as well as elevated RVSP c/w severe pulmonary hypertension.  She developed acute onset after she went out to dinner with friends and became acutely short of breath after returning home. Typically not O2 dependent. O2 sats were as low as 70s when EMS arrived. In ED, CXR showed some hazy densities bilaterally. CT chest was neg for PE, showed extensive ground glass infiltrate throughout the lungs and some consolidative changes in the lower lobes (L worse than R). ProBNP 1900. Trop 0.075. WBC 17.6. Procalcitonin 0.13 (low risk of systemic bacterial infection). Lactate 3.4. Placed on BIPAP in the ED. Also started on Vancomycin and Zosyn given recent hospitalization. Initially placed on nitro gtt but this was quickly weaned while she was in the ED  --  acute onset of respiratory symptoms more suggestive of flash pulmonary edema than pneumonia, no reported cough/fever prior to admission  -- cont diuresis with Lasix 40mg IV BID, net -3.5L  -- Vanco and Zosyn continued on admission; leukocytosis has been variable   -- remains afebrile,   procalcitonin trended up and then now down.   -- despite 4+ Liters of diuresis, cxr is essentially unchanged.   -- she maintaints on high flow at 40L at 60% oxygen. lactates have remain nl   -- cont IV antibiotics for now   -- weaned off BiPAP and has remained on high flow    -- will consult pulmonary   -- plan for LHC/RHC     Elevated troponin dt demand ischemia:  Suspect demand ischemia in the setting of acute hypoxic respiratory failure.   -- serial troponins remained relatively flat (0.075 -> 0.142 -> 0.084)  -- hold off on repeating echocardiogram as she just had one on 4/17/17  -- plan for ischemic workup when resp status better.     Paroxysmal Afib with hx of AV Node Ablation  Sick Sinus Syndrome s/p ppm:  Remains paced.   Anticoagulated with Eliquis -> held 4/23 dt reported hemoptysis,  Toprol XL dc'd during recent hospital stay, no issues with rate control thus far     Hypertension:  Meds adjusted during recent hospital stay -> Toprol XL dc'd and was started on amlodipine 10mg daily though I see this was on hold per [facility] as of 4/20  -- cont lisinopril 20mg daily and amlodipine 10mg daily, BPs remain stable     Dyslipidemia:  Chronic and stable on statin. Can resume once off BiPAP.     Stage III CKD:  Baseline Cr seems to be 1.1-1.2.   -- monitor renal function while diuresing, Cr remains stable thus far     Glaucoma:  Cont home eye gtts.     Anemia with episodes of hemoptysis:  -- had episode of hemoptysis on 4/22 PM - 4/23 AM  -- hgb initially trended down: 13.6 on 4/22 -> 11.3 on 4/23 but has since remained stable   -- ?due in part to pulmonary edema  -- Eliquis held 4/23, hemoptysis improved and hgb stable today so  "should be okay to resume anticoagulation after heart cath studies.     FEN: no IVFs, lytes stable, cardiac/low Na diet    DVT Prophylaxis:   - Eliquis on hold due to hemoptysis and pending LHC/RHC    Code Status: DNR/DNI     Disposition:  > 2 days    Interval History   - chart reviewed  - patient known to me at time of admission  - noted good diuresis  - she is on high flow at 8L      -Data reviewed today: I reviewed all new labs and imaging over the last 24 hours. I personally reviewed no images or EKG's today.    Physical Exam   Heart Rate: 70, Blood pressure 104/70, pulse 96, temperature 98.6  F (37  C), temperature source Axillary, resp. rate 30, height 1.6 m (5' 3\"), weight 52.2 kg (115 lb 1.3 oz), SpO2 92 %.  Vitals:    04/23/17 0420 04/24/17 0500 04/26/17 0637   Weight: 51.7 kg (114 lb) 51.7 kg (113 lb 14.4 oz) 52.2 kg (115 lb 1.3 oz)     Vital Signs with Ranges  Temp:  [97.6  F (36.4  C)-98.6  F (37  C)] 98.6  F (37  C)  Heart Rate:  [70-75] 70  Resp:  [14-30] 30  BP: (104-144)/(58-98) 104/70  FiO2 (%):  [60 %] 60 %  SpO2:  [89 %-96 %] 92 %  I/O's Last 24 hours  I/O last 3 completed shifts:  In: 503 [P.O.:300; I.V.:203]  Out: 1750 [Urine:1750]    Constitutional: Awake, alert, cooperative, no apparent distress  Respiratory: Diminished left lung base  Cardiovascular: Regular rate and rhythm, normal S1 and S2, + murmur noted  GI: Normal bowel sounds, soft, non-distended, non-tender  Skin/Integumen: No rashes, no cyanosis, no edema  Other:      Medications   All medications were reviewed.    - MEDICATION INSTRUCTIONS -       - MEDICATION INSTRUCTIONS -         furosemide  20 mg Intravenous BID     metoprolol  25 mg Oral BID     amLODIPine  5 mg Oral Daily     piperacillin-tazobactam  3.375 g Intravenous Q6H     sodium chloride (PF)  3 mL Intracatheter Q8H     timolol  1 drop Both Eyes BID     multivitamin, therapeutic with minerals  1 tablet Oral Daily     lisinopril  20 mg Oral Daily     latanoprost  1 drop " Both Eyes At Bedtime     brimonidine  1 drop Both Eyes BID     vancomycin (VANCOCIN) IV  1,250 mg Intravenous Q48H        Data     Recent Labs  Lab 04/26/17  0534 04/25/17  1254 04/25/17  0525 04/24/17  0628  04/22/17  1252 04/22/17  0526 04/21/17 2035 04/21/17  1928   WBC 12.6*  --  15.6* 20.8*  < >  --  26.1*  --  17.6*   HGB 11.8  --  12.0 11.8  < >  --  13.6  --  16.7*   MCV 98  --  97 97  < >  --  98  --  100     --  174 148*  < >  --  197  --  246   INR  --   --   --   --   --   --   --  1.16* Canceled, Test credited Unsatisfactory specimen - hemolyzed Notification of test cancellation was given toER via trackboard at 2020.SL     --  136 137  < >  --  140 139 Canceled, Test credited Unsatisfactory specimen - hemolyzed Notification of test cancellation was given toER via trackboard at 2020.SL   POTASSIUM 3.8 4.0 3.1* 3.3*  < >  --  4.1 4.0 Canceled, Test credited Unsatisfactory specimen - hemolyzed Notification of test cancellation was given toER via trackboard at 2020.SL   CHLORIDE 103  --  100 100  < >  --  107 107 Canceled, Test credited Unsatisfactory specimen - hemolyzed Notification of test cancellation was given toER via trackboard at 2020.SL   CO2 27  --  26 27  < >  --  23 23 Canceled, Test credited Unsatisfactory specimen - hemolyzed Notification of test cancellation was given toER via trackboard at 2020.SL   BUN 31*  --  32* 30  < >  --  37* 32* Canceled, Test credited Unsatisfactory specimen - hemolyzed Notification of test cancellation was given toER via trackboard at 2020.SL   CR 1.06*  --  1.05* 1.14*  < >  --  1.07* 1.34* Canceled, Test credited Unsatisfactory specimen - hemolyzed Notification of test cancellation was given toER via trackboard at 2020.SL   ANIONGAP 8  --  10 10  < >  --  10 9 Canceled, Test credited Unsatisfactory specimen - hemolyzed Notification of test cancellation was given toER via trackboard at 2020.SL   CARLO 9.2  --  8.9 8.8  < >  --  8.7 7.9* Canceled, Test  credited Unsatisfactory specimen - hemolyzed Notification of test cancellation was given toER via trackboard at 2020.SL   GLC 88  --  101* 97  < >  --  125* 152* Canceled, Test credited Unsatisfactory specimen - hemolyzed Notification of test cancellation was given toER via trackboard at 2020.SL   ALBUMIN  --   --   --   --   --   --   --  2.9* Canceled, Test credited Unsatisfactory specimen - hemolyzed Notification of test cancellation was given toER via trackboard at 2020.SL   PROTTOTAL  --   --   --   --   --   --   --  6.3* Canceled, Test credited Unsatisfactory specimen - hemolyzed Notification of test cancellation was given toER via trackboard at 2020.SL   BILITOTAL  --   --   --   --   --   --   --  0.7 Canceled, Test credited Unsatisfactory specimen - hemolyzed Notification of test cancellation was given toER via trackboard at 2020.SL   ALKPHOS  --   --   --   --   --   --   --  121 Canceled, Test credited Unsatisfactory specimen - hemolyzed Notification of test cancellation was given toER via trackboard at 2020.SL   ALT  --   --   --   --   --   --   --  34 Canceled, Test credited Unsatisfactory specimen - hemolyzed Notification of test cancellation was given toER via trackboard at 2020.SL   AST  --   --   --   --   --   --   --  45 Canceled, Test credited Unsatisfactory specimen - hemolyzed Notification of test cancellation was given toER via trackboard at 2020.SL   TROPI  --   --   --   --   --  0.084* 0.142* 0.075* Canceled, Test credited Unsatisfactory specimen - hemolyzed Notification of test cancellation was given toER via trackboard at 2020.SL   < > = values in this interval not displayed.    No results found for this or any previous visit (from the past 24 hour(s)).    Sumanth Rincon MD  Pager [phone]

## 2017-04-26 NOTE — PROGRESS NOTES
Pt on HFNC 40L 60% throughout the night with SpO2 in the low to mid 90's.LS fine crackles.Will cont to monitor.  4/26/2017  Natalie Hamm

## 2017-04-27 NOTE — PROGRESS NOTES
[facility]    Palliative Care Progress Note    Rebecca Helms  MRN# [id number]  Date of Admission:  4/21/2017  Date of Service (when I saw the patient): 04/27/2017    Assessment & Plan   Rebecca Helms is a 88 year old female with PMH significant for severe valvular disease, HTN, HLD, paroxysmal a.fib with hx AVN ablation on chronic anticoagulation, sick sinus syndrome, chronic diastolic HF, and CAD who presents with acute onset SOB. She is being treated for PNA and pulmonary edema, Cardiology is following and Pulmonary was consulted. She initially required bipap, but is now stable on high flow O2. She has had 4 hospitalizations in the recent months. We are consulted for goals of care.     Symptoms/Recommendations  1. Dyspnea. Acute respiratory failure, unclear if this is cardiac related or 2/2 PNA.   -Continue high flow O2, diuresis, abx, duonebs  -Could consider low dose opioid if sx worsen   -Pt is willing to proceed with heart catheterization     Support/Coping  -Spoke with daughter Lainey by phone today     Decisional Support, Goals of Care, Counseling & Coordination  Decisional Capacity Intact?  -Yes   Health Care Directive on File?  -Yes, considering POLST form   Code Status/Resuscitation Preferences?  -DNR/DNI    Discussion  Decision to pursue heart catheterization remains on hold given tenuous respiratory status. Spoke with Dr. Rincon and bedside RN Flor today. Mat Retana is visiting this weekend to provide support, will be good to touch base with her. Dr. Rincon hopes a few more days of therapy will help declare Rebecca's clinical situation; to consider heart cath to eval for TAVR vs focusing on comfort.     I called mat Retana today. She is coming to visit tomorrow evening, but will need to leave Sunday because she is a . She is agreeable to doing a phone conference on Monday at 10AM. She hopes to see how her mother is doing and provide support. She also hopes to hear more  from the cardiologist. Our conversation mainly entailed discussion around her tenuous breathing status. If it improves, we may consider the heart cath as Rebecca's previous wishes were to try to fix and reverse what can be done. But should her clinical status stay the same or even worsen, the procedure may not be possible, and then focusing on comfort at that point then makes sense. Lainey's conversations with Rebecca of late have been more realistic about her declining health. Lainey's perception is that Rebecca is talking more about hospice and understanding that time is likely limited.     Attempted to visit Rebecca, but she was sleeping. She appears to be sleeping comfortably and breathing without difficulty. I did not wake her up for an exam or discussion.     Case reviewed with nursing staff and Dr. Rincon. Plan for phone care conference with pt and dtr Lainey on Monday at 10AM.     Thank you for involving us in the care of this patient and family. We will continue to follow. Please do not hesitate to contact me with questions or concerns or the on-call provider for our team if evening or weekend.    Ariadne NICOLE, [facility]  Palliative Medicine   Pager [phone]    Attestation:  Total time on the floor involved in the patient's care: 45 minutes  Total time spent in counseling/care coordination: >50%    Interval History   Respiratory status remains tenuous, still on high flow O2. Evaluated by Pulmonary. Remains unclear if resp issues are 2/2 heart or lungs. Heart cath remains on hold in setting of tenuous breathing status. She broke her hearing aid overnight, which has led to challenging communication today.     Medications   Current Facility-Administered Medications Ordered in Epic   Medication Dose Route Frequency Last Rate Last Dose     spironolactone (ALDACTONE) tablet 25 mg  25 mg Oral Daily   25 mg at 04/27/17 1110     potassium chloride SA (K-DUR/KLOR-CON M) CR tablet 20-40 mEq  20-40 mEq Oral Q2H PRN   20 mEq  at 04/27/17 0920     potassium chloride (KLOR-CON) Packet 20-40 mEq  20-40 mEq Oral or Feeding Tube Q2H PRN   40 mEq at 04/27/17 0641     potassium chloride 10 mEq in 100 mL intermittent infusion  10 mEq Intravenous Q1H PRN         potassium chloride 10 mEq in 100 mL intermittent infusion with 10 mg lidocaine  10 mEq Intravenous Q1H PRN         potassium chloride 20 mEq in 50 mL intermittent infusion  20 mEq Intravenous Q2H PRN         magnesium sulfate 4 g in 100 mL sterile water (premade)  4 g Intravenous Q4H PRN         furosemide (LASIX) injection 20 mg  20 mg Intravenous BID   20 mg at 04/27/17 0819     metoprolol (LOPRESSOR) tablet 25 mg  25 mg Oral BID   25 mg at 04/27/17 0818     amLODIPine (NORVASC) tablet 5 mg  5 mg Oral Daily   5 mg at 04/27/17 0819     piperacillin-tazobactam (ZOSYN) 3.375 g vial to attach to  mL bag  3.375 g Intravenous Q6H 100 mL/hr at 04/27/17 0433 3.375 g at 04/27/17 1018     labetalol (NORMODYNE/TRANDATE) injection 10 mg  10 mg Intravenous Q2H PRN         naloxone (NARCAN) injection 0.1-0.4 mg  0.1-0.4 mg Intravenous Q2 Min PRN         lidocaine 1 % 1 mL  1 mL Other Q1H PRN         lidocaine (LMX4) cream   Topical Q1H PRN         sodium chloride (PF) 0.9% PF flush 3 mL  3 mL Intracatheter Q1H PRN   3 mL at 04/26/17 1632     sodium chloride (PF) 0.9% PF flush 3 mL  3 mL Intracatheter Q8H   3 mL at 04/27/17 1427     nitroglycerin (NITROSTAT) sublingual tablet 0.4 mg  0.4 mg Sublingual Q5 Min PRN         Patient is already receiving anticoagulation with heparin, enoxaparin (LOVENOX), warfarin (COUMADIN)  or other anticoagulant medication   Does not apply Continuous PRN         acetaminophen (TYLENOL) tablet 650 mg  650 mg Oral Q4H PRN   650 mg at 04/27/17 0920     ondansetron (ZOFRAN-ODT) ODT tab 4 mg  4 mg Oral Q6H PRN        Or     ondansetron (ZOFRAN) injection 4 mg  4 mg Intravenous Q6H PRN         No lozenges or gum should be given while patient on BIPAP   Does not apply  Continuous PRN         hypromellose-dextran (ARTIFICAL TEARS) ophthalmic solution 1 drop  1 drop Both Eyes Q1H PRN         HOLD: All Oral Medications   Does not apply HOLD         timolol (TIMOPTIC) 0.5 % ophthalmic solution 1 drop  1 drop Both Eyes BID   1 drop at 04/27/17 0838     multivitamin, therapeutic with minerals (THERA-VIT-M) tablet 1 tablet  1 tablet Oral Daily   1 tablet at 04/27/17 0819     moxifloxacin (VIGAMOX) 0.5 % ophthalmic solution 1 drop  1 drop Left Eye Daily PRN   1 drop at 04/24/17 2133     lisinopril (PRINIVIL/ZESTRIL) tablet 20 mg  20 mg Oral Daily   20 mg at 04/27/17 0819     latanoprost (XALATAN) 0.005 % ophthalmic solution 1 drop  1 drop Both Eyes At Bedtime   1 drop at 04/26/17 2048     brimonidine (ALPHAGAN) 0.2 % ophthalmic solution 1 drop  1 drop Both Eyes BID   1 drop at 04/27/17 0839     No current [facility]-ordered outpatient prescriptions on file.       Physical Exam   Temp: 97.4  F (36.3  C) Temp src: Oral BP: 92/80   Heart Rate: 70 Resp: 22 SpO2: 99 % O2 Device: High Flow Nasal Cannula (HFNC) Oxygen Delivery: Other (Comments) (40 Liter)  Vitals:    04/24/17 0500 04/26/17 0637 04/27/17 0453   Weight: 51.7 kg (113 lb 14.4 oz) 52.2 kg (115 lb 1.3 oz) 52.2 kg (115 lb 1.3 oz)     CONSTITUTIONAL: Chronically ill elderly woman seen sleeping comfortably in bed in NAD, not awoken for today's visit   HEENT: NCAT  RESPIRATORY: NL respiratory effort on high flow O2    Data   Results for orders placed or performed during the hospital encounter of 04/21/17 (from the past 24 hour(s))   Basic metabolic panel   Result Value Ref Range    Sodium 140 133 - 144 mmol/L    Potassium 3.2 (L) 3.4 - 5.3 mmol/L    Chloride 102 94 - 109 mmol/L    Carbon Dioxide 29 20 - 32 mmol/L    Anion Gap 9 3 - 14 mmol/L    Glucose 81 70 - 99 mg/dL    Urea Nitrogen 25 7 - 30 mg/dL    Creatinine 0.92 0.52 - 1.04 mg/dL    GFR Estimate 58 (L) >60 mL/min/1.7m2    GFR Estimate If Black 70 >60 mL/min/1.7m2    Calcium 8.8 8.5 -  10.1 mg/dL   CBC with platelets   Result Value Ref Range    WBC 11.1 (H) 4.0 - 11.0 10e9/L    RBC Count 3.50 (L) 3.8 - 5.2 10e12/L    Hemoglobin 11.6 (L) 11.7 - 15.7 g/dL    Hematocrit 34.5 (L) 35.0 - 47.0 %    MCV 99 78 - 100 fl    MCH 33.1 (H) 26.5 - 33.0 pg    MCHC 33.6 31.5 - 36.5 g/dL    RDW 15.8 (H) 10.0 - 15.0 %    Platelet Count 194 150 - 450 10e9/L   Potassium   Result Value Ref Range    Potassium 3.5 3.4 - 5.3 mmol/L

## 2017-04-27 NOTE — PLAN OF CARE
Problem: Goal Outcome Summary  Goal: Goal Outcome Summary  Outcome: Improving  Pt is A&O, forgetful at times, [place]. HAs in place. Monitor shows V paced rhythm. Pt's O2 sats stable in the 90s this shift of high flow O2. LS diminished with fine crackles and wheezes. 2+ edema present in ankles. 1+ generalized edema present. Areas of slow to darell redness noted under O2 strap on both cheeks and behind ears. WOCN consult placed. Mepilex applied to cheeks and gauze placed under O2 strap behind ears. Pt is up with Ax1 to BSC. Up in chair for dinner. No d/c plans. Will have heart cath when stable.

## 2017-04-27 NOTE — PLAN OF CARE
Problem: Goal Outcome Summary  Goal: Goal Outcome Summary  Outcome: No Change  Pt continues to require hi flow o2, 60% 40L flow. Pt with dyspnea when out of bed to BSC, recovers after 10 minutes or so with sats recovering to mid 90's.  Responding well to diureses, up to commode x 6 this shift.  Pt with fine crackles bilat, +cough, no hemoptysis noted this shift.  Family aware of hearing aid malfunction due to pt's chewing on aid.  No plan present to replace at this time. Pt responds well to loud voices, pantomime to get information across.  Daughter expected to be here from out of town Friday evening.

## 2017-04-27 NOTE — PROGRESS NOTES
Patient is on HFNC 40L 60% with SpO2 in the mid 90's.  Will cont to follow and decrease FiO2 as tolerated.  4/27/2017   Nissa Ross RRT

## 2017-04-27 NOTE — CONSULTS
Pulmonary Medicine Consultation        Date of Admission: 4/21/2017  Primary Attending:  Sumanth Rincon MD  Consulting Physician: Dez Mortensen MD      History:    Rebecca Helms is a  88 year old  Female with PMH significant for severe valvular disease, HTN, HLD, paroxysmal a.fib with hx AVN ablation on chronic anticoagulation, sick sinus syndrome, chronic diastolic HF, and CAD who presents with acute onset SOB. She is being treated for PNA and pulmonary edema, Cardiology is following. She initially required bipap, but is now stable on high flow O2.   Patient has got a history of sick sinus syndrome resulting in pacemaker placement as a history of paroxysmal atrial fibrillation with intermittent tachycardia on Eliquis, but she had prior AV node ablation. She has got osteoporosis as well.   Admitted on 4- with acute dyspnea and hypoxia In the Emergency Department, she was noted to be afebrile, blood pressures reach as high as 218/95. The patient was placed on BiPAP. Lab work reviewed including normal electrolytes, BUN 32, creatinine 1.34. BNP is 1900. Troponin is mildly elevated at 0.075, glucose 152. INR is 1.16.  CT Chest revealed No pulmonary embolism demonstrated. Extensive bilateral pneumonia. Has been treated with Abx and diuresis with only slow improvement. Severe pulmonary hypertension, PA systolic pressure likely in the 60's-70's. PAFib, now s/p AVN ablation and PPM on 4/7/17. Initially seemed helpful to have consistent HR control with episodes of afib, but obviously did not prevent this epidsode.We  Are consulted for persistent hypoxemia    Review of system:   ROS is negative except for items mentioned above and in HPI.           Prior medical history:  Past Medical History:   Diagnosis Date     Aortic stenosis     moderate to severe     Back pain      Carotid stenosis     left side     Chronic diastolic CHF (congestive heart failure) (H)      Chronic renal insufficiency      Dyslipidemia       Glaucoma      HTN (hypertension)      Osteoporosis      Paroxysmal atrial fibrillation (H) 09/12/2014    sp AVN ablation 4/2017     Pulmonary hypertension (H)     moderate to severe     Sinus node dysfunction (H)     sp DDD PM     Syncope        Past Surgical History:   Procedure Laterality Date     DILATION AND CURETTAGE, HYSTEROSCOPY DIAGNOSTIC, COMBINED  1/16/2013    Procedure: COMBINED DILATION AND CURETTAGE, HYSTEROSCOPY DIAGNOSTIC;   DILATION AND CURETTAGE, HYSTEROSCOPY;  Surgeon: Ozzie Lo MD;  Location: [facility]     ENT SURGERY      TONSILLECTOMY/BILAT EAR SURGERY     EYE SURGERY      BILAT CATARACT-IOL     GYN SURGERY      OVARIAN CYSTECTOMY     IMPLANT PACEMAKER      dual chamber       Patient Active Problem List   Diagnosis     [email or web address]     Lumbar radiculopathy     Back strain     Syncope and collapse     CAD (coronary artery disease)     Sinus node dysfunction (H)     Carotid stenosis     Severe tricuspid regurgitation     Pulmonary hypertension (H)     Atrial fibrillation (H)     Pacemaker     Spinal stenosis     HTN (hypertension)     Dyslipidemia     Chronic diastolic CHF (congestive heart failure) (H)     Carotid bruit     Glaucoma     Pain of left lower extremity     Syncope     Acute respiratory failure (H)       Social History     Social History     Social History     Marital status:      Spouse name: N/A     Number of children: N/A     Years of education: N/A     Occupational History     Not on file.     Social History Main Topics     Smoking status: Never Smoker     Smokeless tobacco: Not on file     Alcohol use Yes      Comment: OCC     Drug use: No     Sexual activity: Not on file     Other Topics Concern     Caffeine Concern No     2-3 cups decaf coffee per day     Sleep Concern No     Weight Concern Yes     weight loss     Special Diet No     Exercise Yes     exercises at home     Social History Narrative         Family History  Family History   Problem Relation Age of  Onset     Unknown/Adopted Mother      Myocardial Infarction Father      Heart Surgery Father            Medications  No current outpatient prescriptions on file.     Current Facility-Administered Medications Ordered in Epic   Medication Dose Route Frequency Last Rate Last Dose     spironolactone (ALDACTONE) tablet 25 mg  25 mg Oral Daily         furosemide (LASIX) injection 40 mg  40 mg Intravenous Once         potassium chloride SA (K-DUR/KLOR-CON M) CR tablet 20-40 mEq  20-40 mEq Oral Q2H PRN   20 mEq at 04/27/17 0920     potassium chloride (KLOR-CON) Packet 20-40 mEq  20-40 mEq Oral or Feeding Tube Q2H PRN   40 mEq at 04/27/17 0641     potassium chloride 10 mEq in 100 mL intermittent infusion  10 mEq Intravenous Q1H PRN         potassium chloride 10 mEq in 100 mL intermittent infusion with 10 mg lidocaine  10 mEq Intravenous Q1H PRN         potassium chloride 20 mEq in 50 mL intermittent infusion  20 mEq Intravenous Q2H PRN         magnesium sulfate 4 g in 100 mL sterile water (premade)  4 g Intravenous Q4H PRN         furosemide (LASIX) injection 20 mg  20 mg Intravenous BID   20 mg at 04/27/17 0819     metoprolol (LOPRESSOR) tablet 25 mg  25 mg Oral BID   25 mg at 04/27/17 0818     amLODIPine (NORVASC) tablet 5 mg  5 mg Oral Daily   5 mg at 04/27/17 0819     piperacillin-tazobactam (ZOSYN) 3.375 g vial to attach to  mL bag  3.375 g Intravenous Q6H 100 mL/hr at 04/27/17 0433 3.375 g at 04/27/17 1018     labetalol (NORMODYNE/TRANDATE) injection 10 mg  10 mg Intravenous Q2H PRN         naloxone (NARCAN) injection 0.1-0.4 mg  0.1-0.4 mg Intravenous Q2 Min PRN         lidocaine 1 % 1 mL  1 mL Other Q1H PRN         lidocaine (LMX4) cream   Topical Q1H PRN         sodium chloride (PF) 0.9% PF flush 3 mL  3 mL Intracatheter Q1H PRN   3 mL at 04/26/17 1632     sodium chloride (PF) 0.9% PF flush 3 mL  3 mL Intracatheter Q8H   3 mL at 04/27/17 0433     nitroglycerin (NITROSTAT) sublingual tablet 0.4 mg  0.4 mg  Sublingual Q5 Min PRN         Patient is already receiving anticoagulation with heparin, enoxaparin (LOVENOX), warfarin (COUMADIN)  or other anticoagulant medication   Does not apply Continuous PRN         acetaminophen (TYLENOL) tablet 650 mg  650 mg Oral Q4H PRN   650 mg at 04/27/17 0920     ondansetron (ZOFRAN-ODT) ODT tab 4 mg  4 mg Oral Q6H PRN        Or     ondansetron (ZOFRAN) injection 4 mg  4 mg Intravenous Q6H PRN         No lozenges or gum should be given while patient on BIPAP   Does not apply Continuous PRN         hypromellose-dextran (ARTIFICAL TEARS) ophthalmic solution 1 drop  1 drop Both Eyes Q1H PRN         HOLD: All Oral Medications   Does not apply HOLD         timolol (TIMOPTIC) 0.5 % ophthalmic solution 1 drop  1 drop Both Eyes BID   1 drop at 04/27/17 0838     multivitamin, therapeutic with minerals (THERA-VIT-M) tablet 1 tablet  1 tablet Oral Daily   1 tablet at 04/27/17 0819     moxifloxacin (VIGAMOX) 0.5 % ophthalmic solution 1 drop  1 drop Left Eye Daily PRN   1 drop at 04/24/17 2133     lisinopril (PRINIVIL/ZESTRIL) tablet 20 mg  20 mg Oral Daily   20 mg at 04/27/17 0819     latanoprost (XALATAN) 0.005 % ophthalmic solution 1 drop  1 drop Both Eyes At Bedtime   1 drop at 04/26/17 2048     brimonidine (ALPHAGAN) 0.2 % ophthalmic solution 1 drop  1 drop Both Eyes BID   1 drop at 04/27/17 0839     vancomycin 1250 mg in 0.9% NaCl 250 mL PREMIX  1,250 mg Intravenous Q48H 167 mL/hr at 04/26/17 2054 1,250 mg at 04/26/17 2054     No current Epic-ordered outpatient prescriptions on file.       Allergies   Allergen Reactions     Amiodarone Visual Disturbance     Decreased visual acuity                 Physical Examination:   Vitals:    04/27/17 0600 04/27/17 0725 04/27/17 0800 04/27/17 1000   BP: 134/64  145/76 107/59   BP Location:       Pulse:       Resp: 18  28 15   Temp:  97.6  F (36.4  C)     TempSrc:  Oral     SpO2: 97%   93%   Weight:       Height:         Body mass index is 20.39  kg/(m^2).  Temp (24hrs), Av.8  F (36.6  C), Min:97.5  F (36.4  C), Max:98.3  F (36.8  C)        Constitutional:  Appears comfortable.  HENT:  mucous membranes moist.  Eyes: PERRLA, no icterus, no pallor.   Neck: No lymphadenopathy or thyromegaly, trachea midline, no carotid bruits.  Cardiovascular: Regular rate and rythym, no murmurs, rubs or gallops, +peripheral edema.  Respiratory/Chest: bilateral coarse breath sounds and crakcles.  Gastrointestinal: Abdomen was soft, non-tender, non-distended, no masses felt, no hepatosplenomegaly.  Musculoskeletal: No clubbing or cyanosis, full range of motion in all extremities.  Neurological: No focal motor or sensory deficits. DTR's are 2+ and symmetric.  Skin: No skin rash, hives, petechiae, or breakdown.        CMP  Recent Labs  Lab 17  0524 17  0534 17  1254 17  0525 17  0628  175 17  1928    138  --  136 137  < > 139 Canceled, Test credited Unsatisfactory specimen - hemolyzed Notification of test cancellation was given toER via trackboard at 2020.SL   POTASSIUM 3.2* 3.8 4.0 3.1* 3.3*  < > 4.0 Canceled, Test credited Unsatisfactory specimen - hemolyzed Notification of test cancellation was given toER via trackboard at 2020.SL   CHLORIDE 102 103  --  100 100  < > 107 Canceled, Test credited Unsatisfactory specimen - hemolyzed Notification of test cancellation was given toER via trackboard at 2020.SL   CO2 29 27  --  26 27  < > 23 Canceled, Test credited Unsatisfactory specimen - hemolyzed Notification of test cancellation was given toER via trackboard at 2020.SL   ANIONGAP 9 8  --  10 10  < > 9 Canceled, Test credited Unsatisfactory specimen - hemolyzed Notification of test cancellation was given toER via trackboard at 2020.SL   GLC 81 88  --  101* 97  < > 152* Canceled, Test credited Unsatisfactory specimen - hemolyzed Notification of test cancellation was given toER via trackboard at .SL   BUN 25 31*  --  32* 30   < > 32* Canceled, Test credited Unsatisfactory specimen - hemolyzed Notification of test cancellation was given toER via trackboard at 2020.SL   CR 0.92 1.06*  --  1.05* 1.14*  < > 1.34* Canceled, Test credited Unsatisfactory specimen - hemolyzed Notification of test cancellation was given toER via trackboard at 2020.SL   GFRESTIMATED 58* 49*  --  49* 45*  < > 37* Canceled, Test credited Unsatisfactory specimen - hemolyzed Notification of test cancellation was given toER via trackboard at 2020.SL   GFRESTBLACK 70 59*  --  60* 54*  < > 45* Canceled, Test credited Unsatisfactory specimen - hemolyzed Notification of test cancellation was given toER via trackboard at 2020.SL   CARLO 8.8 9.2  --  8.9 8.8  < > 7.9* Canceled, Test credited Unsatisfactory specimen - hemolyzed Notification of test cancellation was given toER via trackboard at 2020.SL   MAG  --   --  2.2  --   --   --   --   --    PROTTOTAL  --   --   --   --   --   --  6.3* Canceled, Test credited Unsatisfactory specimen - hemolyzed Notification of test cancellation was given toER via trackboard at 2020.SL   ALBUMIN  --   --   --   --   --   --  2.9* Canceled, Test credited Unsatisfactory specimen - hemolyzed Notification of test cancellation was given toER via trackboard at 2020.SL   BILITOTAL  --   --   --   --   --   --  0.7 Canceled, Test credited Unsatisfactory specimen - hemolyzed Notification of test cancellation was given toER via trackboard at 2020.SL   ALKPHOS  --   --   --   --   --   --  121 Canceled, Test credited Unsatisfactory specimen - hemolyzed Notification of test cancellation was given toER via trackboard at 2020.SL   AST  --   --   --   --   --   --  45 Canceled, Test credited Unsatisfactory specimen - hemolyzed Notification of test cancellation was given toER via trackboard at 2020.SL   ALT  --   --   --   --   --   --  34 Canceled, Test credited Unsatisfactory specimen - hemolyzed Notification of test cancellation was given toER via  trackboard at 2020.SL   < > = values in this interval not displayed.  CBC  Recent Labs  Lab 04/27/17  0524 04/26/17  0534 04/25/17  0525 04/24/17  0628   WBC 11.1* 12.6* 15.6* 20.8*   RBC 3.50* 3.59* 3.64* 3.60*   HGB 11.6* 11.8 12.0 11.8   HCT 34.5* 35.1 35.3 35.0   MCV 99 98 97 97   MCH 33.1* 32.9 33.0 32.8   MCHC 33.6 33.6 34.0 33.7   RDW 15.8* 16.0* 16.2* 16.4*    182 174 148*     INR  Recent Labs  Lab 04/21/17 2035 04/21/17 1928   INR 1.16* Canceled, Test credited Unsatisfactory specimen - hemolyzed Notification of test cancellation was given toER via trackboard at 2020.SL     Arterial Blood Gas  Recent Labs  Lab 04/22/17  0220   PH 7.44   PCO2 34*   PO2 85   HCO3 23   O2PER 100       Recent Labs  Lab 04/21/17 2000 04/21/17 1928   CULT No growth No growth       Diagnostic Studies:  Chest Radiology:     CT CHEST AND PULMONARY EMBOLISM ANGIOGRAM 4/21/2017 8:01 PM      HISTORY: Dyspnea.       FINDINGS: There is no pulmonary embolism. Extensive groundglass  infiltrates throughout all lungs. Consolidative changes seen in both  lower lobes left worse than right. Minimal right pleural effusion.  Patchy consolidative infiltrates seen in the remainder of the lungs.  The heart is not enlarged. There are moderate coronary vascular  calcifications consistent with coronary artery disease. Thoracic aorta  is atherosclerotic without evidence of aneurysm. Contrast bolus is  limiting for evaluation of dissection. No gross dissection  demonstrated. There is no pleural or pericardial effusion. There is  no pneumothorax. Adrenal glands are normal. Remainder of the  visualized upper abdomen is unremarkable.         IMPRESSION:   1. No pulmonary embolism demonstrated.  2. No thoracic aortic aneurysm.   3. Extensive bilateral pneumonia.           Assessment:     88 year old  Female with PMH significant for severe valvular disease, HTN, HLD, paroxysmal a.fib with hx AVN ablation on chronic anticoagulation, sick sinus  syndrome, chronic diastolic HF, and CAD who presents with acute onset SOB. She is being treated for PNA and pulmonary edema, Cardiology is following. She initially required bipap, but is now stable on high flow O2.   Patient has got a history of sick sinus syndrome resulting in pacemaker placement as a history of paroxysmal atrial fibrillation with intermittent tachycardia on Eliquis, but she had prior AV node ablation. She has got osteoporosis as well.   Admitted on 4- with acute dyspnea and hypoxia In the Emergency Department, she was noted to be afebrile, blood pressures reach as high as 218/95. The patient was placed on BiPAP. Lab work reviewed including normal electrolytes, BUN 32, creatinine 1.34. BNP is 1900. Troponin is mildly elevated at 0.075, glucose 152. INR is 1.16.  CT Chest revealed No pulmonary embolism demonstrated. Extensive bilateral pneumonia.  Has been treated with Abx and diuresis with only slow improvement. We  Are consulted for persistent hypoxemia      Pulmonary Diagnoses: Abnl CT/CXR R91.8, CHF I50.9, Hpoxemia R09.02, Pnemonia unspec J18.9, Resp fail acute J96.00, Resp fail chronic J96.10 and SOB R06.02    Recommendations        Continue Duonebs every four hours as needed   HFNC Supplemental oxygen target pulse oxymetry >89%  Agree with palliative care evaluation   Continue antibiotics, ideally a bronchoscopy would be done to further assess cause of infiltrates but unable to do safely considering  High o2 requirements  Diuresis as toleated  BIPAP as needed  Diuresis as tolerated  AFIB and CHF management per cardiology  Physical activity as tolerated  Pulmonary hemorrhage may have similar CT findings and with patient being on anticoagulation (and hemoptysis ) this can be a possibility.  Pursuing this diagnosis will require very jaimie doses of corticosteroids (about 1 gram per day in 4 doses for at least 72 h) but without a BAL I do not recommend it  Will follow   Call if  questions        Dez Lynn M.D.  Pulmonary, Critical Care and Sleep Medicine  [facility]   Pager: [phone]  Office:[phone]

## 2017-04-27 NOTE — PROGRESS NOTES
Assessment and Plan:     1. Acute on chronic dyspnea.  Sudden-onset pulmonary edema seemed most likely, but incomplete response to diuresis and findings on CT chest and CXR suggest an additional component of pneumonia.   2. Acute on chronic HFpEF. Diuresis continues with minimal change in daily weight. Stable renal function. Still requiring high-flow O2 with episodes of desaturation with activity.   3. Possible pneumonia: significant areas of consolidation in upper, posterior lung zones on CT, intermediate level procalcitonin and marked leukocytosis. She also had hemoptysis, with greater than silver dollar size, bright red blood spots of blood with coughing. A chemical pneumonitis is possible due to aspiration of this blood. Eliquis has been stopped. Hemoptysis resolved. WBC improved steadily. Procalcitonin was declining after admission. She continues on empiric antibiotics for institutionally acquired pneumonia.  4. Aortic stenosis. Probably moderate in severity. Mean gradient only 14 mmHg, CHUCK 1.2, with low stroke volume, moderate LVH. EF normal.  5. Moderately severe MR. Etiology unclear  6. Moderately severe TR.  7. Severe pulmonary hypertension, PA systolic pressure likely in the 60's-70's.  8. PAFib, now s/p AVN ablation and PPM on 4/7/17. Initially seemed helpful to have consistent HR control with episodes of afib, but obviously did not prevent this epidsode.  9. HTN with labile BP's. Recent admission for near syncope, with lowered doses of BP meds. BP consistent and controlled now. .  10. Elevated troponin. Likely due to acute HF episode. CAD has not been exluded.    Chest xray yesterday did not show significant improvement since admission. Still with areas of infiltrate, effusion and consolidation bilaterally. I am concerned that she is not responding well to treatment and continues to have significant oxygen demands. This may be a consequence of significant lung injury due to viral or bacterial  "pneumonia or chemical pneumonitis. Or maybe the HF or pulmonary hypertension are playing a more significant role. I would like to do R and L heart cath to sort out the HF vs. pneumonia issue, but she would not tolerate that well yet. I will try increasing diuresis today. Add spironolactone for hypokalemia and augmentation of diuresis and increase IV lasix today.    Hold off on heart cath today. She is still a bit tenuous with her breathing and oxygen levels.     BP/HR look good now.     Continue to hold Eliquis/heparin due to hemoptysis.     Pulmonology consultation ordered.     Luis White MD  Cardiology.          Interval History:   Still dyspneic with any movement. Feels comfortable at rest. More energetic over the last 2 days. Some improvement in appetite. Hemoptysis resolved.           Medications:       spironolactone  25 mg Oral Daily     furosemide  20 mg Intravenous BID     metoprolol  25 mg Oral BID     amLODIPine  5 mg Oral Daily     piperacillin-tazobactam  3.375 g Intravenous Q6H     sodium chloride (PF)  3 mL Intracatheter Q8H     timolol  1 drop Both Eyes BID     multivitamin, therapeutic with minerals  1 tablet Oral Daily     lisinopril  20 mg Oral Daily     latanoprost  1 drop Both Eyes At Bedtime     brimonidine  1 drop Both Eyes BID     vancomycin (VANCOCIN) IV  1,250 mg Intravenous Q48H            Physical Exam:   Blood pressure 145/76, pulse 96, temperature 97.6  F (36.4  C), temperature source Oral, resp. rate 28, height 1.6 m (5' 3\"), weight 52.2 kg (115 lb 1.3 oz), SpO2 97 %.    Vitals:    04/24/17 0500 04/26/17 0637 04/27/17 0453   Weight: 51.7 kg (113 lb 14.4 oz) 52.2 kg (115 lb 1.3 oz) 52.2 kg (115 lb 1.3 oz)         Intake/Output Summary (Last 24 hours) at 04/24/17 0948  Last data filed at 04/24/17 0600   Gross per 24 hour   Intake           435.66 ml   Output             1400 ml   Net          -964.34 ml   .    Exam:  GENERAL APPEARANCE ADULT: Alert, in no acute distress  RESP: rhonchi " mild, no respiratory distress  CV: regular rate with systolic murmur  ABDOMEN: normal bowel sounds, soft, nontender, no hepatosplenomegaly or other masses  EXTREMITIES: No edema         Data:   LABS (Last four results)  CMP    Recent Labs  Lab 04/27/17  0524 04/26/17  0534 04/25/17  1254 04/25/17  0525 04/24/17  0628  04/21/17 2035 04/21/17  1928    138  --  136 137  < > 139 Canceled, Test credited Unsatisfactory specimen - hemolyzed Notification of test cancellation was given toER via trackboard at 2020.SL   POTASSIUM 3.2* 3.8 4.0 3.1* 3.3*  < > 4.0 Canceled, Test credited Unsatisfactory specimen - hemolyzed Notification of test cancellation was given toER via trackboard at 2020.SL   CHLORIDE 102 103  --  100 100  < > 107 Canceled, Test credited Unsatisfactory specimen - hemolyzed Notification of test cancellation was given toER via trackboard at 2020.SL   CO2 29 27  --  26 27  < > 23 Canceled, Test credited Unsatisfactory specimen - hemolyzed Notification of test cancellation was given toER via trackboard at 2020.SL   ANIONGAP 9 8  --  10 10  < > 9 Canceled, Test credited Unsatisfactory specimen - hemolyzed Notification of test cancellation was given toER via trackboard at 2020.SL   GLC 81 88  --  101* 97  < > 152* Canceled, Test credited Unsatisfactory specimen - hemolyzed Notification of test cancellation was given toER via trackboard at 2020.SL   BUN 25 31*  --  32* 30  < > 32* Canceled, Test credited Unsatisfactory specimen - hemolyzed Notification of test cancellation was given toER via trackboard at 2020.SL   CR 0.92 1.06*  --  1.05* 1.14*  < > 1.34* Canceled, Test credited Unsatisfactory specimen - hemolyzed Notification of test cancellation was given toER via trackboard at 2020.SL   GFRESTIMATED 58* 49*  --  49* 45*  < > 37* Canceled, Test credited Unsatisfactory specimen - hemolyzed Notification of test cancellation was given toER via trackboard at 2020.SL   GFRESTBLACK 70 59*  --  60* 54*  < > 45*  Canceled, Test credited Unsatisfactory specimen - hemolyzed Notification of test cancellation was given toER via trackboard at 2020.SL   CARLO 8.8 9.2  --  8.9 8.8  < > 7.9* Canceled, Test credited Unsatisfactory specimen - hemolyzed Notification of test cancellation was given toER via trackboard at 2020.SL   MAG  --   --  2.2  --   --   --   --   --    PROTTOTAL  --   --   --   --   --   --  6.3* Canceled, Test credited Unsatisfactory specimen - hemolyzed Notification of test cancellation was given toER via trackboard at 2020.SL   ALBUMIN  --   --   --   --   --   --  2.9* Canceled, Test credited Unsatisfactory specimen - hemolyzed Notification of test cancellation was given toER via trackboard at 2020.SL   BILITOTAL  --   --   --   --   --   --  0.7 Canceled, Test credited Unsatisfactory specimen - hemolyzed Notification of test cancellation was given toER via trackboard at 2020.SL   ALKPHOS  --   --   --   --   --   --  121 Canceled, Test credited Unsatisfactory specimen - hemolyzed Notification of test cancellation was given toER via trackboard at 2020.SL   AST  --   --   --   --   --   --  45 Canceled, Test credited Unsatisfactory specimen - hemolyzed Notification of test cancellation was given toER via trackboard at 2020.SL   ALT  --   --   --   --   --   --  34 Canceled, Test credited Unsatisfactory specimen - hemolyzed Notification of test cancellation was given toER via trackboard at 2020.SL   < > = values in this interval not displayed.  CBC    Recent Labs  Lab 04/27/17  0524 04/26/17  0534 04/25/17  0525 04/24/17  0628   WBC 11.1* 12.6* 15.6* 20.8*   RBC 3.50* 3.59* 3.64* 3.60*   HGB 11.6* 11.8 12.0 11.8   HCT 34.5* 35.1 35.3 35.0   MCV 99 98 97 97   MCH 33.1* 32.9 33.0 32.8   MCHC 33.6 33.6 34.0 33.7   RDW 15.8* 16.0* 16.2* 16.4*    182 174 148*     INR    Recent Labs  Lab 04/21/17 2035 04/21/17 1928   INR 1.16* Canceled, Test credited Unsatisfactory specimen - hemolyzed Notification of test  cancellation was given toER via trackboard at 2020.     TROPONINS   Lab Results   Component Value Date    TROPI 0.084 (H) 04/22/2017    TROPI 0.142 (HH) 04/22/2017    TROPI 0.075 (H) 04/21/2017    TROPI  04/21/2017     Canceled, Test credited   Unsatisfactory specimen - hemolyzed   Notification of test cancellation was given to  ER via trackboard at 2020.      TROPI 0.035 04/18/2017                                                                                                               YOKO HARRELL MD

## 2017-04-27 NOTE — PROGRESS NOTES
[facility]  Hospitalist Progress Note  Sumanth Rincon MD  04/27/2017    Assessment & Plan    Rebecca Helms is a 88 year old female with complex PMHx including severe valvular disease (moderate MR, moderate TR and mod-severe AS), hypertension, dyslipidemia, paroxsymal afib with hx of AVN ablation on chronic anticoagulation, sick sinus syndrome s/p ppm, chronic diastolic CHF and carotid artery stenosis who was admitted on 4/21/2017 with acute onset shortness of breath suspected secondary to flash pulmonary edema. She was initially requiring BiPAP and was admitted to the [facility].     Note she was recently hospitalized here from 4/17/17 - 4/19/17 for evaluation of syncope. Workup included a repeat echocardiogram with findings as summarized above. Her medications were adjusted (Toprol XL dc'd and started on amlodipine 5mg daily). She had no s/sx of fluid overload during that stay.      Acute Hypoxic Respiratory Failure, likely multifactorial with flash pulmonary edema and consolidative pneumonia and hemoptysis  in the setting of severe valvular disease   Acute Exacerbation of Chronic Diastolic CHF:   Echocardiogram on 4/17 showed EF 60-65%, 2+MR, 2+TR and mod AS as well as elevated RVSP c/w severe pulmonary hypertension.  She developed acute onset after she went out to dinner with friends and became acutely short of breath after returning home. Typically not O2 dependent. O2 sats were as low as 70s when EMS arrived. In ED, CXR showed some hazy densities bilaterally. CT chest was neg for PE, showed extensive ground glass infiltrate throughout the lungs and some consolidative changes in the lower lobes (L worse than R). ProBNP 1900. Trop 0.075. WBC 17.6. Procalcitonin 0.13 (low risk of systemic bacterial infection). Lactate 3.4. Placed on BIPAP in the ED. Also started on Vancomycin and Zosyn given recent hospitalization. Initially placed on nitro gtt but this was quickly weaned while she was in the ED  --  acute onset of respiratory symptoms more suggestive of flash pulmonary edema than pneumonia, no reported cough/fever prior to admission  -- cont diuresis with IV lasix, more aggressive diuresis as per cardiology  -- continue Zosyn for now, recheck procalcitonin and plan to discontinue soon. -- discontinue vancomycin today 4/27   -- remains afebrile,   procalcitonin trended up and then now down.   -- despite 4+ Liters of diuresis, cxr is essentially unchanged.   -- she maintaints on high flow at 40L at 60% oxygen. lactates have remain nl    -- weaned off down high flow with more aggressive diuresis the next few days.  -- daughter coming on 4/28 late evening.  -- plan for maximal medical therapy over the weekend and if no improvement, then more towards hospice  -- if she improves over the weekend then C/RH     Elevated troponin dt demand ischemia:  Suspect demand ischemia in the setting of acute hypoxic respiratory failure.   -- serial troponins remained relatively flat (0.075 -> 0.142 -> 0.084)  -- hold off on repeating echocardiogram as she just had one on 4/17/17  -- plan for ischemic workup when resp status better.     Paroxysmal Afib with hx of AV Node Ablation  Sick Sinus Syndrome s/p ppm:  Remains paced.   Anticoagulated with Eliquis -> held 4/23 dt reported hemoptysis,  Toprol XL dc'd during recent hospital stay, no issues with rate control thus far     Hypertension:  Meds adjusted during recent hospital stay -> Toprol XL dc'd and was started on amlodipine 10mg daily though I see this was on hold per [facility] as of 4/20  -- cont lisinopril 20mg daily and amlodipine 10mg daily, BPs remain stable     Dyslipidemia:  Chronic and stable on statin. Can resume once off BiPAP.     Stage III CKD:  -- Baseline Cr seems to be 1.1-1.2, down to 0.92  -- she should be able to tolerate more aggressive diuresis over the weekend.     Glaucoma:  Cont home eye gtts.     Hemoptysis:  -- had episode of hemoptysis on 4/22 PM  "- 4/23 AM  -- hgb initially trended down: 13.6 on 4/22 -> 11.3 on 4/23 but has since remained stable   -- ?due in part to pulmonary edema  -- Eliquis held 4/23, hemoptysis improved and hgb stable today so should be okay to resume anticoagulation after heart cath studies.  -- trail of high dose steroids possibility noted for pulmonary hemorrhage.    -- this maybe a last ditch effort before moving for comfort cares.  Can reassess on Monday 5/1     FEN: no IVFs, lytes stable, cardiac/low Na diet    DVT Prophylaxis:   - Eliquis on hold due to hemoptysis and pending LHC/RHC    Code Status: DNR/DNI     Disposition:  > 2 days    Interval History   - she accidentally chewed on her hearing aids  - being up to commode frequently with diuresis  - noted modest diuresis  - she is still on high flow at 40L  - pulmonary notes reviewed      -Data reviewed today: I reviewed all new labs and imaging over the last 24 hours. I personally reviewed no images or EKG's today.    Physical Exam   Heart Rate: 70, Blood pressure 115/65, pulse 96, temperature 97.4  F (36.3  C), temperature source Oral, resp. rate 17, height 1.6 m (5' 3\"), weight 52.2 kg (115 lb 1.3 oz), SpO2 99 %.  Vitals:    04/24/17 0500 04/26/17 0637 04/27/17 0453   Weight: 51.7 kg (113 lb 14.4 oz) 52.2 kg (115 lb 1.3 oz) 52.2 kg (115 lb 1.3 oz)     Vital Signs with Ranges  Temp:  [97.4  F (36.3  C)-97.8  F (36.6  C)] 97.4  F (36.3  C)  Heart Rate:  [69-71] 70  Resp:  [13-28] 17  BP: (107-145)/(55-76) 115/65  FiO2 (%):  [60 %] 60 %  SpO2:  [91 %-100 %] 99 %  I/O's Last 24 hours  I/O last 3 completed shifts:  In: 950 [P.O.:850; I.V.:100]  Out: 1125 [Urine:1125]    Constitutional: Awake, on high flow, [place]  Respiratory: Left base fine crackles  Cardiovascular: Regular rate and rhythm, normal S1 and S2, + murmur noted  GI: Normal bowel sounds, soft, non-distended, non-tender  Skin/Integumen: No rashes, no cyanosis, no edema  Other:      Medications   All medications were " reviewed.    - MEDICATION INSTRUCTIONS -       - MEDICATION INSTRUCTIONS -         spironolactone  25 mg Oral Daily     furosemide  20 mg Intravenous BID     metoprolol  25 mg Oral BID     amLODIPine  5 mg Oral Daily     piperacillin-tazobactam  3.375 g Intravenous Q6H     sodium chloride (PF)  3 mL Intracatheter Q8H     timolol  1 drop Both Eyes BID     multivitamin, therapeutic with minerals  1 tablet Oral Daily     lisinopril  20 mg Oral Daily     latanoprost  1 drop Both Eyes At Bedtime     brimonidine  1 drop Both Eyes BID     vancomycin (VANCOCIN) IV  1,250 mg Intravenous Q48H        Data     Recent Labs  Lab 04/27/17  0524 04/26/17  0534 04/25/17  1254 04/25/17  0525  04/22/17  1252 04/22/17  0526 04/21/17 2035 04/21/17 1928   WBC 11.1* 12.6*  --  15.6*  < >  --  26.1*  --  17.6*   HGB 11.6* 11.8  --  12.0  < >  --  13.6  --  16.7*   MCV 99 98  --  97  < >  --  98  --  100    182  --  174  < >  --  197  --  246   INR  --   --   --   --   --   --   --  1.16* Canceled, Test credited Unsatisfactory specimen - hemolyzed Notification of test cancellation was given toER via trackboard at 2020.SL    138  --  136  < >  --  140 139 Canceled, Test credited Unsatisfactory specimen - hemolyzed Notification of test cancellation was given toER via trackboard at 2020.SL   POTASSIUM 3.2* 3.8 4.0 3.1*  < >  --  4.1 4.0 Canceled, Test credited Unsatisfactory specimen - hemolyzed Notification of test cancellation was given toER via trackboard at 2020.SL   CHLORIDE 102 103  --  100  < >  --  107 107 Canceled, Test credited Unsatisfactory specimen - hemolyzed Notification of test cancellation was given toER via trackboard at 2020.SL   CO2 29 27  --  26  < >  --  23 23 Canceled, Test credited Unsatisfactory specimen - hemolyzed Notification of test cancellation was given toER via trackboard at 2020.SL   BUN 25 31*  --  32*  < >  --  37* 32* Canceled, Test credited Unsatisfactory specimen - hemolyzed Notification of  test cancellation was given toER via trackboard at 2020.SL   CR 0.92 1.06*  --  1.05*  < >  --  1.07* 1.34* Canceled, Test credited Unsatisfactory specimen - hemolyzed Notification of test cancellation was given toER via trackboard at 2020.SL   ANIONGAP 9 8  --  10  < >  --  10 9 Canceled, Test credited Unsatisfactory specimen - hemolyzed Notification of test cancellation was given toER via trackboard at 2020.SL   CARLO 8.8 9.2  --  8.9  < >  --  8.7 7.9* Canceled, Test credited Unsatisfactory specimen - hemolyzed Notification of test cancellation was given toER via trackboard at 2020.SL   GLC 81 88  --  101*  < >  --  125* 152* Canceled, Test credited Unsatisfactory specimen - hemolyzed Notification of test cancellation was given toER via trackboard at 2020.SL   ALBUMIN  --   --   --   --   --   --   --  2.9* Canceled, Test credited Unsatisfactory specimen - hemolyzed Notification of test cancellation was given toER via trackboard at 2020.SL   PROTTOTAL  --   --   --   --   --   --   --  6.3* Canceled, Test credited Unsatisfactory specimen - hemolyzed Notification of test cancellation was given toER via trackboard at 2020.SL   BILITOTAL  --   --   --   --   --   --   --  0.7 Canceled, Test credited Unsatisfactory specimen - hemolyzed Notification of test cancellation was given toER via trackboard at 2020.SL   ALKPHOS  --   --   --   --   --   --   --  121 Canceled, Test credited Unsatisfactory specimen - hemolyzed Notification of test cancellation was given toER via trackboard at 2020.SL   ALT  --   --   --   --   --   --   --  34 Canceled, Test credited Unsatisfactory specimen - hemolyzed Notification of test cancellation was given toER via trackboard at 2020.SL   AST  --   --   --   --   --   --   --  45 Canceled, Test credited Unsatisfactory specimen - hemolyzed Notification of test cancellation was given toER via trackboard at 2020.SL   TROPI  --   --   --   --   --  0.084* 0.142* 0.075* Canceled, Test credited  Unsatisfactory specimen - hemolyzed Notification of test cancellation was given toER via trackboard at 2020.SL   < > = values in this interval not displayed.    No results found for this or any previous visit (from the past 24 hour(s)).    Sumanth Rincon MD  Pager [phone]

## 2017-04-27 NOTE — PLAN OF CARE
"Problem: Goal Outcome Summary  Goal: Goal Outcome Summary  Outcome: No Change  VSS, O2 sats stable on High-flow 60%, 40L.  Cheeks and ears padded r/t skin breakdown from High-flow straps, WOC today.  K 3.2 this morning, 40mEq given at 0640.  Denies pain.  Up w/ 1 assist to [facility], gets [facility].  Continue plan of care.    *Pt called RN into room around 0630 this morning and stated \"I thought my hearing aid was a piece of candy and I bit down on it.\"  RN checked pt's mouth and did not see any pieces of hearing aid, pt stated she did not swallow any of hearing aid.  Pt spit hearing aid pieces out in the cup it was in and the battery is visible as well as the hearing pieces.  Pt gets her hearing aid from [facility].  RN called [facility] and they do not open until 0700, RN will attempt to call then.     *RN called daughter/JEN Retana at 0750 and left a message explaining the hearing aid incident, left [facility] nursing station phone number as well.  "

## 2017-04-28 NOTE — PROGRESS NOTES
[facility]  Hospitalist Progress Note  Sumanth Rincon MD  04/28/2017    Assessment & Plan    Rebecca Helms is a 88 year old female with complex PMHx including severe valvular disease (moderate MR, moderate TR and mod-severe AS), hypertension, dyslipidemia, paroxsymal afib with hx of AVN ablation on chronic anticoagulation, sick sinus syndrome s/p ppm, chronic diastolic CHF and carotid artery stenosis who was admitted on 4/21/2017 with acute onset shortness of breath suspected secondary to flash pulmonary edema. She was initially requiring BiPAP and [place] [facility]     Note she was recently hospitalized here from 4/17/17 - 4/19/17 for evaluation of syncope. Workup included a repeat echocardiogram with findings as summarized above. Her medications were adjusted (Toprol XL dc'd and started on amlodipine 5mg daily). She had no s/sx of fluid overload during that stay.      Acute Hypoxic Respiratory Failure, most likely pulmonary hemmorhage ane less likely other co-morbidities including flash pulmonary edema,  consolidative pneumonia in the setting of severe valvular disease   Echocardiogram on 4/17 showed EF 60-65%, 2+MR, 2+TR and mod AS as well as elevated RVSP c/w severe pulmonary hypertension.  She developed acute onset after she went out to dinner with friends and became acutely short of breath after returning home. Typically not O2 dependent. O2 sats were as low as 70s when EMS arrived. In ED, CXR showed some hazy densities bilaterally. CT chest was neg for PE, showed extensive ground glass infiltrate throughout the lungs and some consolidative changes in the lower lobes (L worse than R). ProBNP 1900. Trop 0.075. WBC 17.6. Procalcitonin 0.13 (low risk of systemic bacterial infection). Lactate 3.4. Placed on BIPAP in the ED.  On Vancomycin and Zosyn initially and now just zosyn given recent hospitalization. Initially placed on nitro gtt but this was quickly weaned while she was in the  ED  -- her respiratory status has not improved with aggressive diuresis and abx.  -- her hemoptysis has improved, though she is not stable for bronchoscopy  -- continue Zosyn   -- start high dose steroids, solumedrol 250 mg q6 hours for 72 hours  -- pulmonary (dr crespo) will follow.  -- despite 4+ Liters of diuresis, cxr is essentially unchanged.   -- daughter coming on 4/28 late evening.  -- if she improves over the weekend then LHC/RHC  -- will place on IV PPI and accuchecks with ssi with high dose steroids.     Elevated troponin dt demand ischemia:  Suspect demand ischemia in the setting of acute hypoxic respiratory failure.   -- serial troponins remained relatively flat (0.075 -> 0.142 -> 0.084)  -- hold off on repeating echocardiogram as she just had one on 4/17/17  -- plan for ischemic workup when resp status better.     Paroxysmal Afib with hx of AV Node Ablation  Sick Sinus Syndrome s/p ppm:  Remains paced.   Anticoagulated with Eliquis -> held 4/23 dt reported hemoptysis,  Toprol XL dc'd during recent hospital stay, no issues with rate control thus far     Hypertension:  Meds adjusted during recent hospital stay -> Toprol XL dc'd and was started on amlodipine 10mg daily though I see this was on hold per [facility] as of 4/20  -- cont lisinopril 20mg daily and amlodipine 10mg daily, BPs remain stable     Dyslipidemia:  Chronic and stable on statin. Can resume once off BiPAP.     Stage III CKD:  -- Baseline Cr seems to be 1.1-1.2, down to 0.92  -- she should be able to tolerate more aggressive diuresis over the weekend.     Glaucoma:  Cont home eye gtts.     Pulmonary hemmorhage  -- had episode of hemoptysis on 4/22 PM - 4/23 AM  -- hgb initially trended down: 13.6 on 4/22 -> 11.3 on 4/23 but has since remained stable   -- trail of high dose steroids for pulmonary hemorrhage.    -- this maybe a last ditch effort before moving for comfort cares.  Can reassess on Monday 5/1     FEN: no IVFs, lytes stable,  "cardiac/low Na diet    DVT Prophylaxis:   - Eliquis on hold due to hemoptysis and pending C/RHC    Code Status: DNR/DNI     Disposition:  > 2 days    Interval History   - despite diuresis, not much clinical improvement  - she is still on high flow at 40L  - discussed with pulmonary and cardiology      -Data reviewed today: I reviewed all new labs and imaging over the last 24 hours. I personally reviewed no images or EKG's today.    Physical Exam   Heart Rate: 70, Blood pressure 115/58, pulse 96, temperature 97.9  F (36.6  C), temperature source Oral, resp. rate 15, height 1.6 m (5' 3\"), weight 50.5 kg (111 lb 5.3 oz), SpO2 97 %.  Vitals:    04/26/17 0637 04/27/17 0453 04/28/17 0400   Weight: 52.2 kg (115 lb 1.3 oz) 52.2 kg (115 lb 1.3 oz) 50.5 kg (111 lb 5.3 oz)     Vital Signs with Ranges  Temp:  [97.4  F (36.3  C)-97.9  F (36.6  C)] 97.9  F (36.6  C)  Heart Rate:  [70] 70  Resp:  [10-26] 15  BP: ()/(56-67) 115/58  FiO2 (%):  [60 %-70 %] 70 %  SpO2:  [93 %-100 %] 97 %  I/O's Last 24 hours  I/O last 3 completed shifts:  In: 415 [P.O.:415]  Out: 1275 [Urine:1275]    Constitutional: Awake, on high flow, [place]  Respiratory: Left base fine crackles  Cardiovascular: Regular rate and rhythm, normal S1 and S2, + murmur noted  GI: Normal bowel sounds, soft, non-distended, non-tender  Skin/Integumen: No rashes, no cyanosis, no edema  Other:      Medications   All medications were reviewed.    - MEDICATION INSTRUCTIONS -       - MEDICATION INSTRUCTIONS -         spironolactone  25 mg Oral Daily     furosemide  20 mg Intravenous BID     metoprolol  25 mg Oral BID     amLODIPine  5 mg Oral Daily     piperacillin-tazobactam  3.375 g Intravenous Q6H     sodium chloride (PF)  3 mL Intracatheter Q8H     timolol  1 drop Both Eyes BID     multivitamin, therapeutic with minerals  1 tablet Oral Daily     lisinopril  20 mg Oral Daily     latanoprost  1 drop Both Eyes At Bedtime     brimonidine  1 drop Both Eyes BID        Data "     Recent Labs  Lab 04/28/17  1115 04/28/17  0523 04/27/17  1424 04/27/17  0524 04/26/17  0534  04/22/17  1252 04/22/17  0526 04/21/17 2035 04/21/17  1928   WBC  --  10.0  --  11.1* 12.6*  < >  --  26.1*  --  17.6*   HGB  --  11.3*  --  11.6* 11.8  < >  --  13.6  --  16.7*   MCV  --  99  --  99 98  < >  --  98  --  100   PLT  --  193  --  194 182  < >  --  197  --  246   INR  --   --   --   --   --   --   --   --  1.16* Canceled, Test credited Unsatisfactory specimen - hemolyzed Notification of test cancellation was given toER via trackboard at 2020.SL   NA  --  138  --  140 138  < >  --  140 139 Canceled, Test credited Unsatisfactory specimen - hemolyzed Notification of test cancellation was given toER via trackboard at 2020.SL   POTASSIUM 4.9 3.3* 3.5 3.2* 3.8  < >  --  4.1 4.0 Canceled, Test credited Unsatisfactory specimen - hemolyzed Notification of test cancellation was given toER via trackboard at 2020.SL   CHLORIDE  --  100  --  102 103  < >  --  107 107 Canceled, Test credited Unsatisfactory specimen - hemolyzed Notification of test cancellation was given toER via trackboard at 2020.SL   CO2  --  33*  --  29 27  < >  --  23 23 Canceled, Test credited Unsatisfactory specimen - hemolyzed Notification of test cancellation was given toER via trackboard at 2020.SL   BUN  --  24  --  25 31*  < >  --  37* 32* Canceled, Test credited Unsatisfactory specimen - hemolyzed Notification of test cancellation was given toER via trackboard at 2020.SL   CR  --  1.01  --  0.92 1.06*  < >  --  1.07* 1.34* Canceled, Test credited Unsatisfactory specimen - hemolyzed Notification of test cancellation was given toER via trackboard at 2020.SL   ANIONGAP  --  5  --  9 8  < >  --  10 9 Canceled, Test credited Unsatisfactory specimen - hemolyzed Notification of test cancellation was given toER via trackboard at 2020.SL   CARLO  --  8.8  --  8.8 9.2  < >  --  8.7 7.9* Canceled, Test credited Unsatisfactory specimen - hemolyzed  Notification of test cancellation was given toER via trackboard at 2020.SL   GLC  --  86  --  81 88  < >  --  125* 152* Canceled, Test credited Unsatisfactory specimen - hemolyzed Notification of test cancellation was given toER via trackboard at 2020.SL   ALBUMIN  --   --   --   --   --   --   --   --  2.9* Canceled, Test credited Unsatisfactory specimen - hemolyzed Notification of test cancellation was given toER via trackboard at 2020.SL   PROTTOTAL  --   --   --   --   --   --   --   --  6.3* Canceled, Test credited Unsatisfactory specimen - hemolyzed Notification of test cancellation was given toER via trackboard at 2020.SL   BILITOTAL  --   --   --   --   --   --   --   --  0.7 Canceled, Test credited Unsatisfactory specimen - hemolyzed Notification of test cancellation was given toER via trackboard at 2020.SL   ALKPHOS  --   --   --   --   --   --   --   --  121 Canceled, Test credited Unsatisfactory specimen - hemolyzed Notification of test cancellation was given toER via trackboard at 2020.SL   ALT  --   --   --   --   --   --   --   --  34 Canceled, Test credited Unsatisfactory specimen - hemolyzed Notification of test cancellation was given toER via trackboard at 2020.SL   AST  --   --   --   --   --   --   --   --  45 Canceled, Test credited Unsatisfactory specimen - hemolyzed Notification of test cancellation was given toER via trackboard at 2020.SL   TROPI  --   --   --   --   --   --  0.084* 0.142* 0.075* Canceled, Test credited Unsatisfactory specimen - hemolyzed Notification of test cancellation was given toER via trackboard at 2020.SL   < > = values in this interval not displayed.    No results found for this or any previous visit (from the past 24 hour(s)).    Sumanth Rincon MD  Pager [phone]

## 2017-04-28 NOTE — PROGRESS NOTES
Patient currently on HFNC 40LPM 70%. LS diminished. Spo2 between 92-97%. Will continue to follow and wean HFNC as tolerated.    Basilio BATEMAN

## 2017-04-28 NOTE — PLAN OF CARE
Problem: Goal Outcome Summary  Goal: Goal Outcome Summary  Outcome: No Change  VSS.  No c/o pain. Sating well on hi flow O2 overnight. Up to bedside commode to void. New IV placed. Palliative conference Monday.

## 2017-04-28 NOTE — PROGRESS NOTES
[facility] Nurse Inpatient Wound Assessment     Initial Assessment of wound(s) on pt's:   Cheeks/Ears        Data:   Patient History:      per MD note(s): 88 year old female with complex PMHx including severe valvular disease (moderate MR, moderate TR and mod-severe AS), hypertension, dyslipidemia, paroxsymal afib with hx of AVN ablation on chronic anticoagulation, sick sinus syndrome s/p ppm, chronic diastolic CHF and carotid artery stenosis who was admitted on 2017 with acute onset shortness of breath suspected secondary to flash pulmonary edema. She was initially requiring BiPAP and was admitted to the [facility] [place].     Moisture Management:  Incontinence Protocol    Catheter secured? Not applicable    Current Diet / Nutrition:           Active Diet Order      Combination Diet Low Saturated Fat Na <2400mg Diet             Mati Assessment and sub scores:   Mati Score  Av  Min: 17  Max: 20     Labs:         Recent Labs   Lab Test  17   0524   17   2035   ALBUMIN   --    --   2.9*   HGB  11.6*   < >   --    RBC  3.50*   < >   --    WBC  11.1*   < >   --    PLT  194   < >   --    INR   --    --   1.16*    < > = values in this interval not displayed.          Wound Assessment (location):   Facial cheeks/R Ear  Wound History:  Pt has oxygen straps tightly secured over cheeks and over/behind hears BL    Wound Base: R ear has blanchable redness, skin intact with no open area or drainage                           BL cheeks are pink in color and blanchable with no further redness noted     Skin is thin and frail    Specific Dimensions (length x width x depth, in cm) :   R Ear 1cm x 1cm    Tunneling:  N/A    Undermining: N/A    Palpation of the wound bed:  normal    Slough appearance:  none    Eschar appearance:  none    Periwound Skin: intact,      Color: normal and consistent with surrounding tissue    Temperature  normal     Drainage:  None     Pain:  None          Intervention:  "    Patient's chart evaluated.      Skin Care: Oxygen EZ wrap tube cushions to tubing around BL ears and Mepilex border adhesive or Mepilex lite cut in half to cheeks Face.     Orders  Written    Supplies Mepilex in room, nurse to order EZ wrap tube cushions    Discussed plan of care with Patient and Nurse          Assessment:       1. Pt is thin and frail and oxygen straps are pressing on cheeks and rubbing on ears      2.  Wounds are not PI and skin remains intact and blanchable      3. Will protect skin with EZ cushion tubing around strap at ears and Mepilex border or Mepilex lite to cheeks.         Plan:     Nursing to notify the Provider(s) and re-consult the [facility] Nurse if wound(s) deteriorate(s) or if the wound care plan needs reevaluation.    Plan of care for wound located on Cheeks:   1. Clean intact skin with gentle soap and water to cheeks, dry and dry again.  2. Paint with No Sting Skin Prep (#[id number]) and allow to dry thoroughly  3. Cut appropriate size and apply  Mepilex  Border Dressing (#[id number]) or Mepilex Lite (#[id number])    to the area, making sure to conform nicely to skin curvatures  4. Time and date dressing change and alonso with a \"P\" for prevention.    NOTE** make sure to continue to assess under the Mepilex Dressing BID and document findings.  If epidermis  opens notify CWOCN's and change dressing to \"T\" for treatment      Plan of care for wound located on  Ears:  1. Clean intact skin with gentle soap and water to cheeks, dry and dry again  2. Wrap straps with EZ cushion tubing.  3. Make sure to continue to assess underneath straps BID and document findings        [facility] Nurse will return: Weekly and PRN     Face to face time: 20 Minutes    Israel LEAVITT     "

## 2017-04-28 NOTE — PROGRESS NOTES
"BRIEF NUTRITION ASSESSMENT      REASON FOR ASSESSMENT:  LOS    NUTRITION HISTORY:  Patient states that she lives in a facility where her meals are provided to her in the dining room.  She has been instructed on low sodium diet in the past and reports to me today - \"I do it\".  Relayed to me that she had a good appetite before coming to the hospital --> \"the night I came in I had a salad and 5 gorgeous shrimp for dinner\".  Does have Ensure in her fridge but does not take on a regular basis.     CURRENT DIET AND INTAKE:  Diet:  2400 mg Na, LSF               Per flowsheets, consuming % of meals over the last 2-3 days.  Patient also confirmed that she has been eating well over the last few days (as compared to admit).  This morning for breakfast had 2/3 of an omelet and 100% of the pancake.  Also had a grilled cheese, Magic Cup, and peaches last night.     ANTHROPOMETRICS:  Height: 5'3\"  Weight: 50.5 kg (111#)(4/28)  BMI: 19.7 kg/m2  IBW:  52.3 kg   Weight Status: Normal BMI  %IBW: 97%  Weight History: Patient reports that her usual weight is around 114# --> down since admit due to diuresis     LABS:  Labs noted    MALNUTRITION:  Patient does not meet two of the following criteria necessary for diagnosing malnutrition: significant weight loss, reduced intake, subcutaneous fat loss, muscle loss or fluid retention    NUTRITION INTERVENTION:  Nutrition Diagnosis:  No nutrition diagnosis at this time.    Implementation:  Nutrition Education:  Not appropriate at this time due to patient condition.  Also patient has received 2 gram Na diet education during past admissions.   Medical food supplement therapy:  Offer Magic Cup with meals (likes chocolate).      FOLLOW UP/MONITORING:   Will re-evaluate in 7 - 10 days, or sooner, if re-consulted.    Melissa Moran RD, LD, [facility]   Clinical Dietitian - [facility]             "

## 2017-04-28 NOTE — PROGRESS NOTES
Assessment and Plan:     1. Acute on chronic dyspnea.  Sudden-onset pulmonary edema seemed most likely, but incomplete response to diuresis and findings on CT chest and CXR suggest an additional component of pneumonia.   2. Acute on chronic HFpEF. Diuresis continues with minimal change in daily weight. Stable renal function. Still requiring high-flow O2 with episodes of desaturation with activity.   3. Possible pneumonia: significant areas of consolidation in upper, posterior lung zones on CT, intermediate level procalcitonin and marked leukocytosis. She also had hemoptysis, with greater than silver dollar size, bright red blood spots of blood with coughing. A chemical pneumonitis is possible due to aspiration of this blood. Eliquis has been stopped. Hemoptysis resolved. WBC improved steadily. Procalcitonin was declining after admission. She continues on empiric antibiotics for institutionally acquired pneumonia.  4. Aortic stenosis. Probably moderate in severity. Mean gradient only 14 mmHg, CHUCK 1.2, with low stroke volume, moderate LVH. EF normal.  5. Moderately severe MR. Etiology unclear  6. Moderately severe TR.  7. Severe pulmonary hypertension, PA systolic pressure likely in the 60's-70's.  8. PAFib, now s/p AVN ablation and PPM on 4/7/17. Initially seemed helpful to have consistent HR control with episodes of afib, but obviously did not prevent this epidsode.  9. HTN with labile BP's. Recent admission for near syncope, with lowered doses of BP meds. BP consistent and controlled now. .  10. Elevated troponin. Likely due to acute HF episode. CAD has not been exluded.    Chest xray 2 days ago did not show significant improvement since admission. Still with areas of infiltrate, effusion and consolidation bilaterally. She has stable/increasing oxygen demands, still with high-flow NC oxygen 70%. But she feels better. She thinks she has improved significantly since Monday. Good diuresis yesterday after a  boost in diuretic dosing but it didn't seem to help decrease her oxygen demands.     Pulmonology consultation was helpful to put the different etiologies in perspective. The suddenness of the onset of her dyspnea was most suggestive of flash pulmonary edema, but imaging and response to therapy were not consistent with this. She did not clearly have signs of bacterial pneumonia, either, and has been treated for this. The best explanation for these events is pulmonary hemorrhage. The imaging, labs, and lack of response to treatment are all consistent with this. She has been started on IV corticosteroid therapy for this. Anticoagulation has been held to prevent further bleeding. The recovery from this is expected to be prolonged.     I don't think we can do heart cath today. She is still tenuous with her breathing and oxygen levels. In fact, I am not convinced that we need to pursue this further at this point. I would reconsider this evaluation if we have further difficulty with the diagnostic possibilities.     BP/HR look good now.     Continue to hold Eliquis/heparin due to hemoptysis.     Alternative DVT prophylaxis.      Luis White MD  Cardiology.          Interval History:   Feels comfortable at rest. More energetic over the last 2 days. She feels like she could get up and walk. Some improvement in appetite. Hemoptysis resolved.           Medications:       spironolactone  25 mg Oral Daily     furosemide  20 mg Intravenous BID     metoprolol  25 mg Oral BID     amLODIPine  5 mg Oral Daily     piperacillin-tazobactam  3.375 g Intravenous Q6H     sodium chloride (PF)  3 mL Intracatheter Q8H     timolol  1 drop Both Eyes BID     multivitamin, therapeutic with minerals  1 tablet Oral Daily     lisinopril  20 mg Oral Daily     latanoprost  1 drop Both Eyes At Bedtime     brimonidine  1 drop Both Eyes BID            Physical Exam:   Blood pressure 115/58, pulse 96, temperature 97.9  F (36.6  C), temperature source  "Oral, resp. rate 15, height 1.6 m (5' 3\"), weight 50.5 kg (111 lb 5.3 oz), SpO2 97 %.    Vitals:    04/26/17 0637 04/27/17 0453 04/28/17 0400   Weight: 52.2 kg (115 lb 1.3 oz) 52.2 kg (115 lb 1.3 oz) 50.5 kg (111 lb 5.3 oz)         Intake/Output Summary (Last 24 hours) at 04/24/17 0948  Last data filed at 04/24/17 0600   Gross per 24 hour   Intake           435.66 ml   Output             1400 ml   Net          -964.34 ml   .    Exam:  GENERAL APPEARANCE ADULT: Alert, in no acute distress  RESP: rhonchi mild, no respiratory distress  CV: regular rate with systolic murmur  ABDOMEN: normal bowel sounds, soft, nontender, no hepatosplenomegaly or other masses  EXTREMITIES: No edema         Data:   LABS (Last four results)  CMP    Recent Labs  Lab 04/28/17  1115 04/28/17  0523 04/27/17  1424 04/27/17  0524 04/26/17  0534 04/25/17  1254 04/25/17  0525  04/21/17  2035 04/21/17  1928   NA  --  138  --  140 138  --  136  < > 139 Canceled, Test credited Unsatisfactory specimen - hemolyzed Notification of test cancellation was given toER via trackboard at 2020.SL   POTASSIUM 4.9 3.3* 3.5 3.2* 3.8 4.0 3.1*  < > 4.0 Canceled, Test credited Unsatisfactory specimen - hemolyzed Notification of test cancellation was given toER via trackboard at 2020.SL   CHLORIDE  --  100  --  102 103  --  100  < > 107 Canceled, Test credited Unsatisfactory specimen - hemolyzed Notification of test cancellation was given toER via trackboard at 2020.SL   CO2  --  33*  --  29 27  --  26  < > 23 Canceled, Test credited Unsatisfactory specimen - hemolyzed Notification of test cancellation was given toER via trackboard at 2020.SL   ANIONGAP  --  5  --  9 8  --  10  < > 9 Canceled, Test credited Unsatisfactory specimen - hemolyzed Notification of test cancellation was given toER via trackboard at 2020.SL   GLC  --  86  --  81 88  --  101*  < > 152* Canceled, Test credited Unsatisfactory specimen - hemolyzed Notification of test cancellation was given toER " via trackboard at 2020.SL   BUN  --  24  --  25 31*  --  32*  < > 32* Canceled, Test credited Unsatisfactory specimen - hemolyzed Notification of test cancellation was given toER via trackboard at 2020.SL   CR  --  1.01  --  0.92 1.06*  --  1.05*  < > 1.34* Canceled, Test credited Unsatisfactory specimen - hemolyzed Notification of test cancellation was given toER via trackboard at 2020.SL   GFRESTIMATED  --  52*  --  58* 49*  --  49*  < > 37* Canceled, Test credited Unsatisfactory specimen - hemolyzed Notification of test cancellation was given toER via trackboard at 2020.SL   GFRESTBLACK  --  62  --  70 59*  --  60*  < > 45* Canceled, Test credited Unsatisfactory specimen - hemolyzed Notification of test cancellation was given toER via trackboard at 2020.SL   CARLO  --  8.8  --  8.8 9.2  --  8.9  < > 7.9* Canceled, Test credited Unsatisfactory specimen - hemolyzed Notification of test cancellation was given toER via trackboard at 2020.SL   MAG  --   --   --   --   --  2.2  --   --   --   --    PROTTOTAL  --   --   --   --   --   --   --   --  6.3* Canceled, Test credited Unsatisfactory specimen - hemolyzed Notification of test cancellation was given toER via trackboard at 2020.SL   ALBUMIN  --   --   --   --   --   --   --   --  2.9* Canceled, Test credited Unsatisfactory specimen - hemolyzed Notification of test cancellation was given toER via trackboard at 2020.SL   BILITOTAL  --   --   --   --   --   --   --   --  0.7 Canceled, Test credited Unsatisfactory specimen - hemolyzed Notification of test cancellation was given toER via trackboard at 2020.SL   ALKPHOS  --   --   --   --   --   --   --   --  121 Canceled, Test credited Unsatisfactory specimen - hemolyzed Notification of test cancellation was given toER via trackboard at 2020.SL   AST  --   --   --   --   --   --   --   --  45 Canceled, Test credited Unsatisfactory specimen - hemolyzed Notification of test cancellation was given toER via trackboard at  2020.SL   ALT  --   --   --   --   --   --   --   --  34 Canceled, Test credited Unsatisfactory specimen - hemolyzed Notification of test cancellation was given toER via trackboard at 2020.SL   < > = values in this interval not displayed.  CBC    Recent Labs  Lab 04/28/17  0523 04/27/17  0524 04/26/17  0534 04/25/17  0525   WBC 10.0 11.1* 12.6* 15.6*   RBC 3.46* 3.50* 3.59* 3.64*   HGB 11.3* 11.6* 11.8 12.0   HCT 34.2* 34.5* 35.1 35.3   MCV 99 99 98 97   MCH 32.7 33.1* 32.9 33.0   MCHC 33.0 33.6 33.6 34.0   RDW 16.0* 15.8* 16.0* 16.2*    194 182 174     INR    Recent Labs  Lab 04/21/17 2035 04/21/17  1928   INR 1.16* Canceled, Test credited Unsatisfactory specimen - hemolyzed Notification of test cancellation was given toER via trackboard at 2020.SL     TROPONINS   Lab Results   Component Value Date    TROPI 0.084 (H) 04/22/2017    TROPI 0.142 (HH) 04/22/2017    TROPI 0.075 (H) 04/21/2017    TROPI  04/21/2017     Canceled, Test credited   Unsatisfactory specimen - hemolyzed   Notification of test cancellation was given to  ER via trackboard at 2020.SL      TROPI 0.035 04/18/2017                                                                                                               YOKO HARRELL MD

## 2017-04-28 NOTE — PLAN OF CARE
Problem: Goal Outcome Summary  Goal: Goal Outcome Summary  Outcome: Improving  Pt is A&O. Denies pain. Monitor shows V paced rhythm. Pt continues on hi flow O2 at 60% and 40 LPM. O2 sats stable in mid to high 90s. LS diminished t/o with fine crackles in bases. Has frequent dry cough. Hands and feet are cool to touch. Trace edema present in ankles. Mepilex dsg to bilateral cheeks are CDI and skin underneath is WDL. Pt is up to BSC with min Ax1. Voiding frequently this shift after IV Lasix. Care conference is planned for Monday to determine ongoing POC.

## 2017-04-28 NOTE — PLAN OF CARE
Problem: Goal Outcome Summary  Goal: Goal Outcome Summary  Outcome: No Change  VSS.  Pt denies any c/o of pain.  Tele is 100% V paced.  Up with assist of 1 to bedside commode.  LS- diminished, sats 90-96% on hi flowO2 70%/40L.  Diuresing with IV lasix. Mepilex on face to protect from Hi flow straps, c/d/i.  Will continue to monitor.

## 2017-04-29 NOTE — PROGRESS NOTES
[facility]  Hospitalist Progress Note  Name: Rebecca Helms    MRN: [id number]  Provider:  Dylan Pavon MD  04/29/17    Initial presenting complaint/issue to hospital (Diagnosis): SOB.         Assessment and Plan:      Summary of Stay: Rebecca Helms is a 88 year old female admitted on 4/21/2017 with complex PMHx including severe valvular disease (moderate MR, moderate TR and mod-severe AS), hypertension, dyslipidemia, paroxsymal afib with hx of AVN ablation on chronic anticoagulation, sick sinus syndrome s/p ppm, chronic diastolic CHF and carotid artery stenosis who was admitted on 4/21/2017 with acute onset shortness of breath suspected secondary to flash pulmonary edema. She was initially requiring BiPAP and [place] [facility] [place]      Note she was recently hospitalized here from 4/17/17 - 4/19/17 for evaluation of syncope. Workup included a repeat echocardiogram with findings as summarized above. Her medications were adjusted (Toprol XL dc'd and started on amlodipine 5mg daily). She had no s/sx of fluid overload during that stay.       Acute Hypoxic Respiratory Failure, most likely pulmonary hemmorhage ane less likely other co-morbidities including flash pulmonary edema,  consolidative pneumonia in the setting of severe valvular disease   Echocardiogram on 4/17 showed EF 60-65%, 2+MR, 2+TR and mod AS as well as elevated RVSP c/w severe pulmonary hypertension.  She developed acute onset after she went out to dinner with friends and became acutely short of breath after returning home. Typically not O2 dependent. O2 sats were as low as 70s when EMS arrived. In ED, CXR showed some hazy densities bilaterally. CT chest was neg for PE, showed extensive ground glass infiltrate throughout the lungs and some consolidative changes in the lower lobes (L worse than R). ProBNP 1900. Trop 0.075. WBC 17.6. Procalcitonin 0.13 (low risk of systemic bacterial infection). Lactate 3.4. Placed on BIPAP in the ED. On  Vancomycin and Zosyn initially and now just zosyn given recent hospitalization. Initially placed on nitro gtt but this was quickly weaned while she was in the ED  -- now on comfort cares.      Elevated troponin dt demand ischemia:  Suspect demand ischemia in the setting of acute hypoxic respiratory failure.   -- serial troponins remained relatively flat (0.075 -> 0.142 -> 0.084)  -- hold off on repeating echocardiogram as she just had one on 4/17/17        Paroxysmal Afib with hx of AV Node Ablation  Sick Sinus Syndrome s/p ppm:  Remains paced.   Anticoagulated with Eliquis -> held 4/23 dt reported hemoptysis,  Toprol XL dc'd during recent hospital stay, no issues with rate control thus far      Hypertension:  Meds adjusted during recent hospital stay -> Toprol XL dc'd and was started on amlodipine 10mg daily though I see this was on hold per [facility] as of 4/20        Dyslipidemia:  Chronic and stable on statin.       Stage III CKD:  -- Baseline Cr seems to be 1.1-1.2, down to 0.92        Glaucoma:  Cont home eye gtts.      Pulmonary hemmorhage  -- had episode of hemoptysis on 4/22 PM - 4/23 AM  -- hgb initially trended down: 13.6 on 4/22 -> 11.3 on 4/23 but has since remained stable   -- now on comfort cares.      FEN: no IVFs, lytes stable, cardiac/low Na diet     DVT Prophylaxis:   - Eliquis on hold due to hemoptysis and pending C/C     Code Status: DNR/DNI              Interval History:        + SOB this am. No pain, states does not want to struggle any more. Spoke to daughter on phone and in person. They are in agreement for comfort cares.                  Physical Exam:      Last Vital Signs:  Temp: 98.6  F (37  C) Temp src: Axillary BP: (!) 80/49 Pulse: 70 Heart Rate: 70 Resp: 11 SpO2: 90 % O2 Device: High Flow Nasal Cannula (HFNC) Oxygen Delivery: 7 LPM    Intake/Output Summary (Last 24 hours) at 04/29/17 1452  Last data filed at 04/29/17 0800   Gross per 24 hour   Intake              200 ml   Output               200 ml   Net                0 ml     I/O last 3 completed shifts:  In: 375 [P.O.:375]  Out: 525 [Urine:525]  Vitals:    04/24/17 0500 04/26/17 0637 04/27/17 0453 04/28/17 0400   Weight: 51.7 kg (113 lb 14.4 oz) 52.2 kg (115 lb 1.3 oz) 52.2 kg (115 lb 1.3 oz) 50.5 kg (111 lb 5.3 oz)    04/29/17 0600   Weight: 51.7 kg (114 lb)       Gen - Alert, awake in distress due to SOB.  Lungs - + crackles B.  Heart - RR,S1+S2 nml, 2/6 systolic murmur.  Abd - soft, NT, + BS.  Ext - garrick anoop.           Medications:      All current medications were reviewed.         Data:      All new lab and imaging data was reviewed.   Labs:  Recent labs and studies reviewed.   Recent Imaging:   No results found for this or any previous visit (from the past 24 hour(s)).

## 2017-04-29 NOTE — PLAN OF CARE
Problem: Goal Outcome Summary  Goal: Goal Outcome Summary  Outcome: No Change  Pt VSS, pt remains on high flow at 70%. Pt had c/o pain in lower back and received tylenol. Plan: continue to monitor.

## 2017-04-29 NOTE — PROGRESS NOTES
"  Cardiology Progress Note          Assessment and Plan:   Readmitted for SOB. Now is gasping for air with reduced mentation. DNR/DNI.  Diffused infiltration on chest x-ray.    Severe pulmonary hypertension.  Atrial fib, sp AVN ablation and pacer implant. LV EF normal.  Moderate aortic stenosis with low gradient.  Moderate mitral regurgitation, moderate tricuspid regurgitation.  History of hypertension, carotid stenosis and chronic renal insufficiency.    The prognosis is very poor. Suggest comfort care.    Marissa Warren MD  Cardiology   [phone]                Diagnosis:     Patient Active Problem List   Diagnosis     iamLUMBAGO     Lumbar radiculopathy     Back strain     Syncope and collapse     CAD (coronary artery disease)     Sinus node dysfunction (H)     Carotid stenosis     Severe tricuspid regurgitation     Pulmonary hypertension (H)     Atrial fibrillation (H)     Pacemaker     Spinal stenosis     HTN (hypertension)     Dyslipidemia     Chronic diastolic CHF (congestive heart failure) (H)     Carotid bruit     Glaucoma     Pain of left lower extremity     Syncope     Acute respiratory failure (H)                Physical Exam:   Blood pressure 127/73, pulse 96, temperature 98.6  F (37  C), temperature source Axillary, resp. rate 13, height 1.6 m (5' 3\"), weight 51.7 kg (114 lb), SpO2 91 %.  Wt Readings from Last 4 Encounters:   04/29/17 51.7 kg (114 lb)   04/19/17 47.7 kg (105 lb 2.6 oz)   04/14/17 51.4 kg (113 lb 4.8 oz)   04/08/17 52.5 kg (115 lb 12.8 oz)      I/O last 3 completed shifts:  In: 375 [P.O.:375]  Out: 525 [Urine:525]    Constitutional: Gasping for air, reduced mentation   Lungs: No crackles or wheezing,    Cardiovascular: Regular rate and rhythm, normal S1 and S2, and no murmur,    Lymphm node  Neck  ENT  Neurologic  Abdomen: No enlargement  No jugular vein extension or carotid bruit  No apparent abnormality  No focal deficit  Normal bowel sounds, soft, no distension, no tender   Skin: No " rashes, no cyanosis   Extremity: No edema          Medications:     Current Facility-Administered Medications:      methylPREDNISolone sodium succinate (solu-MEDROL) 250 mg in NaCl 0.9 % 50 mL intermittent infusion, 250 mg, Intravenous, Q6H, Sumanth Rincon MD, Last Rate: 50 mL/hr at 04/28/17 2137, 250 mg at 04/29/17 0445     glucose 40 % gel 15-30 g, 15-30 g, Oral, Q15 Min PRN **OR** dextrose 50 % injection 25-50 mL, 25-50 mL, Intravenous, Q15 Min PRN **OR** glucagon injection 1 mg, 1 mg, Subcutaneous, Q15 Min PRN, Sumanth Rincon MD     insulin aspart (NovoLOG) inj (RAPID ACTING), 1-3 Units, Subcutaneous, TID AC, Sumanth Rincon MD     insulin aspart (NovoLOG) inj (RAPID ACTING), 1-3 Units, Subcutaneous, At Bedtime, Sumanth Rincon MD     pantoprazole (PROTONIX) EC tablet 40 mg, 40 mg, Oral, QAM, Sumanth Rincon MD     spironolactone (ALDACTONE) tablet 25 mg, 25 mg, Oral, Daily, ChristopherLuis MD, 25 mg at 04/28/17 0808     potassium chloride SA (K-DUR/KLOR-CON M) CR tablet 20-40 mEq, 20-40 mEq, Oral, Q2H PRN, Raul Crum MD, 20 mEq at 04/28/17 0905     potassium chloride (KLOR-CON) Packet 20-40 mEq, 20-40 mEq, Oral or Feeding Tube, Q2H PRN, Raul Crum MD, 40 mEq at 04/27/17 0641     potassium chloride 10 mEq in 100 mL intermittent infusion, 10 mEq, Intravenous, Q1H PRN, Raul Crum MD     potassium chloride 10 mEq in 100 mL intermittent infusion with 10 mg lidocaine, 10 mEq, Intravenous, Q1H PRN, Raul Crum MD     potassium chloride 20 mEq in 50 mL intermittent infusion, 20 mEq, Intravenous, Q2H PRN, Raul Crum MD     magnesium sulfate 4 g in 100 mL sterile water (premade), 4 g, Intravenous, Q4H PRN, Raul Crum MD     furosemide (LASIX) injection 20 mg, 20 mg, Intravenous, BID, Luis White MD, 20 mg at 04/28/17 1556     metoprolol (LOPRESSOR) tablet 25 mg, 25 mg, Oral, BID, Luis White MD, 25 mg at 04/28/17 6700     amLODIPine  (NORVASC) tablet 5 mg, 5 mg, Oral, Daily, ChristopherLuis guillermo MD, 5 mg at 04/28/17 0808     piperacillin-tazobactam (ZOSYN) 3.375 g vial to attach to  mL bag, 3.375 g, Intravenous, Q6H, Sumanth Rincon MD, Last Rate: 100 mL/hr at 04/29/17 0824, 3.375 g at 04/29/17 0824     labetalol (NORMODYNE/TRANDATE) injection 10 mg, 10 mg, Intravenous, Q2H PRN, Poncho Wilson DO     naloxone (NARCAN) injection 0.1-0.4 mg, 0.1-0.4 mg, Intravenous, Q2 Min PRN, Sumanth Rincon MD     lidocaine 1 % 1 mL, 1 mL, Other, Q1H PRN, Sumanth Rincon MD     lidocaine (LMX4) cream, , Topical, Q1H PRN, Sumanth Rincon MD     sodium chloride (PF) 0.9% PF flush 3 mL, 3 mL, Intracatheter, Q1H PRN, Sumanth Rincon MD, 3 mL at 04/27/17 1616     sodium chloride (PF) 0.9% PF flush 3 mL, 3 mL, Intracatheter, Q8H, Sumanth Rincon MD, 3 mL at 04/27/17 1427     nitroglycerin (NITROSTAT) sublingual tablet 0.4 mg, 0.4 mg, Sublingual, Q5 Min PRN, Sumanth Rincon MD     Patient is already receiving anticoagulation with heparin, enoxaparin (LOVENOX), warfarin (COUMADIN)  or other anticoagulant medication, , Does not apply, Continuous PRN, Sumanth Rincon MD     acetaminophen (TYLENOL) tablet 650 mg, 650 mg, Oral, Q4H PRN, Sumanth Rincon MD, 650 mg at 04/29/17 0108     ondansetron (ZOFRAN-ODT) ODT tab 4 mg, 4 mg, Oral, Q6H PRN **OR** ondansetron (ZOFRAN) injection 4 mg, 4 mg, Intravenous, Q6H PRN, Sumanth Rincon MD     No lozenges or gum should be given while patient on BIPAP, , Does not apply, Continuous PRN, Sumanth Rincon MD     hypromellose-dextran (ARTIFICAL TEARS) ophthalmic solution 1 drop, 1 drop, Both Eyes, Q1H PRN, Sumanth Rincon MD     HOLD: All Oral Medications, , Does not apply, HOLD, Sumanth Rincon MD     timolol (TIMOPTIC) 0.5 % ophthalmic solution 1 drop, 1 drop, Both Eyes, BID, Sumanth Rincon MD, 1 drop at 04/28/17 2141     multivitamin, therapeutic with minerals  (THERA-VIT-M) tablet 1 tablet, 1 tablet, Oral, Daily, Sumanth Rincon MD, 1 tablet at 04/28/17 0808     moxifloxacin (VIGAMOX) 0.5 % ophthalmic solution 1 drop, 1 drop, Left Eye, Daily PRN, Sumanth Rincon MD, 1 drop at 04/27/17 2154     lisinopril (PRINIVIL/ZESTRIL) tablet 20 mg, 20 mg, Oral, Daily, Sumanth Rincon MD, 20 mg at 04/28/17 0808     latanoprost (XALATAN) 0.005 % ophthalmic solution 1 drop, 1 drop, Both Eyes, At Bedtime, Sumanth Rincon MD, 1 drop at 04/28/17 2136     brimonidine (ALPHAGAN) 0.2 % ophthalmic solution 1 drop, 1 drop, Both Eyes, BID, Sumanth Rincon MD, 1 drop at 04/28/17 2143           Lab results:        Recent Labs   Lab Test  04/29/17   0534  04/28/17   1115  04/28/17   0523   04/27/17   0524   NA  135   --   138   --   140   POTASSIUM  4.0  4.9  3.3*   < >  3.2*   CHLORIDE  99   --   100   --   102   CARLO  8.8   --   8.8   --   8.8   CO2  32   --   33*   --   29   BUN  25   --   24   --   25   CR  0.99   --   1.01   --   0.92   GLC  157*   --   86   --   81    < > = values in this interval not displayed.     Recent Labs   Lab Test  04/29/17   0534  04/28/17   0523  04/27/17   0524   HGB  11.8  11.3*  11.6*   WBC  9.2  10.0  11.1*   PLT  237  193  194     Recent Labs   Lab Test  04/21/17 2035 04/21/17   1928  04/07/17   1110   INR  1.16*  Canceled, Test credited   Unsatisfactory specimen - hemolyzed   Notification of test cancellation was given to  ER via trackboard at 2020.SL    1.38*     Recent Labs   Lab Test  04/22/17   1252  04/22/17   0526  04/21/17 2035   TROPI  0.084*  0.142*  0.075*

## 2017-04-30 NOTE — PROGRESS NOTES
"  Cardiology Progress Note          Assessment and Plan:   Readmitted for SOB. Her breathing is more stable today but she is more obtunded. Aware that she is no on comfort care. DNR/DNI.  Diffused infiltration on chest x-ray.     Severe pulmonary hypertension.  Atrial fib, sp AVN ablation and pacer implant. LV EF normal.  Moderate aortic stenosis with low gradient.  Moderate mitral regurgitation, moderate tricuspid regurgitation.  History of hypertension, carotid stenosis and chronic renal insufficiency.    Sign off  Marissa Warren MD  Cardiology   [phone]                Diagnosis:     Patient Active Problem List   Diagnosis     iamLUMBAGO     Lumbar radiculopathy     Back strain     Syncope and collapse     CAD (coronary artery disease)     Sinus node dysfunction (H)     Carotid stenosis     Severe tricuspid regurgitation     Pulmonary hypertension (H)     Atrial fibrillation (H)     Pacemaker     Spinal stenosis     HTN (hypertension)     Dyslipidemia     Chronic diastolic CHF (congestive heart failure) (H)     Carotid bruit     Glaucoma     Pain of left lower extremity     Syncope     Acute respiratory failure (H)                Physical Exam:   Blood pressure 146/72, pulse 70, temperature 98.6  F (37  C), temperature source Axillary, resp. rate 16, height 1.6 m (5' 3\"), weight 51.7 kg (114 lb), SpO2 (!) 77 %.  Wt Readings from Last 4 Encounters:   04/29/17 51.7 kg (114 lb)   04/19/17 47.7 kg (105 lb 2.6 oz)   04/14/17 51.4 kg (113 lb 4.8 oz)   04/08/17 52.5 kg (115 lb 12.8 oz)      I/O last 3 completed shifts:  In: 200 [P.O.:200]  Out: 200 [Urine:200]    Constitutional: Obtunded    Lungs: No crackles or wheezing,    Cardiovascular: Regular rate and rhythm, normal S1 and S2, and no murmur,    Lymphm node  Neck  ENT  Neurologic  Abdomen: No enlargement  No jugular vein extension or carotid bruit  No apparent abnormality  No focal deficit  Normal bowel sounds, soft, no distension, no tender   Skin: No rashes, no " cyanosis   Extremity: No edema          Medications:     Current Facility-Administered Medications:      morphine (PF) injection 2 mg, 2 mg, Intravenous, Once, Marissa Warren MD     morphine (PF) injection 1-2 mg, 1-2 mg, Intravenous, Q1H PRN, Dylan Pavon MD, 2 mg at 04/30/17 0803     LORazepam (ATIVAN) injection 0.5-1 mg, 0.5-1 mg, Intravenous, Q3H PRN, 1 mg at 04/29/17 1259 **OR** LORazepam (ATIVAN) tablet 0.5-1 mg, 0.5-1 mg, Oral, Q3H PRN **OR** LORazepam (ATIVAN) tablet 0.5-1 mg, 0.5-1 mg, Sublingual, Q3H PRN, Dylan Pavon MD, 1 mg at 04/30/17 0855     acetaminophen (TYLENOL) tablet 650 mg, 650 mg, Oral, Q6H PRN, Dylan Pavon MD     ondansetron (ZOFRAN-ODT) ODT tab 4 mg, 4 mg, Oral, Q6H PRN **OR** ondansetron (ZOFRAN) injection 4 mg, 4 mg, Intravenous, Q6H PRN, Dylan Pavon MD     atropine 1 % ophthalmic solution 1-2 drop, 1-2 drop, Sublingual, Q1H PRN, Dylan Pavon MD     hypromellose-dextran (ARTIFICAL TEARS) ophthalmic solution 1-2 drop, 1-2 drop, Both Eyes, Q8H PRN, Dylan Pvaon MD     glucose 40 % gel 15-30 g, 15-30 g, Oral, Q15 Min PRN **OR** dextrose 50 % injection 25-50 mL, 25-50 mL, Intravenous, Q15 Min PRN **OR** glucagon injection 1 mg, 1 mg, Subcutaneous, Q15 Min PRN, Sumanth Rincon MD     potassium chloride SA (K-DUR/KLOR-CON M) CR tablet 20-40 mEq, 20-40 mEq, Oral, Q2H PRN, Raul Crum MD, 20 mEq at 04/28/17 0905     potassium chloride (KLOR-CON) Packet 20-40 mEq, 20-40 mEq, Oral or Feeding Tube, Q2H PRN, Raul Crum MD, 40 mEq at 04/27/17 0641     potassium chloride 10 mEq in 100 mL intermittent infusion, 10 mEq, Intravenous, Q1H PRN, Raul Crum MD     potassium chloride 10 mEq in 100 mL intermittent infusion with 10 mg lidocaine, 10 mEq, Intravenous, Q1H PRN, Raul Crum MD     potassium chloride 20 mEq in 50 mL intermittent infusion, 20 mEq, Intravenous, Q2H PRN, Raul Crum MD     magnesium sulfate 4 g in 100 mL sterile water  (premade), 4 g, Intravenous, Q4H PRN, Raul Crum MD     labetalol (NORMODYNE/TRANDATE) injection 10 mg, 10 mg, Intravenous, Q2H PRN, Poncho Wilson DO     naloxone (NARCAN) injection 0.1-0.4 mg, 0.1-0.4 mg, Intravenous, Q2 Min PRN, Sumanth Rincon MD     lidocaine 1 % 1 mL, 1 mL, Other, Q1H PRN, Sumanth Rincon MD     lidocaine (LMX4) cream, , Topical, Q1H PRN, Sumanth Rincon MD     sodium chloride (PF) 0.9% PF flush 3 mL, 3 mL, Intracatheter, Q1H PRN, Sumanth Rincon MD, 3 mL at 04/27/17 1616     sodium chloride (PF) 0.9% PF flush 3 mL, 3 mL, Intracatheter, Q8H, Sumanth Rincon MD, 3 mL at 04/27/17 1427     nitroglycerin (NITROSTAT) sublingual tablet 0.4 mg, 0.4 mg, Sublingual, Q5 Min PRN, Sumanth Rincon MD     Patient is already receiving anticoagulation with heparin, enoxaparin (LOVENOX), warfarin (COUMADIN)  or other anticoagulant medication, , Does not apply, Continuous PRN, Sumanth Rincon MD     No lozenges or gum should be given while patient on BIPAP, , Does not apply, Continuous PRN, Sumanth Rincon MD     HOLD: All Oral Medications, , Does not apply, HOLD, Sumanth Rincon MD     timolol (TIMOPTIC) 0.5 % ophthalmic solution 1 drop, 1 drop, Both Eyes, BID, Sumanth Rincon MD, 1 drop at 04/28/17 2141     moxifloxacin (VIGAMOX) 0.5 % ophthalmic solution 1 drop, 1 drop, Left Eye, Daily PRN, Sumanth Rincon MD, 1 drop at 04/27/17 2154     latanoprost (XALATAN) 0.005 % ophthalmic solution 1 drop, 1 drop, Both Eyes, At Bedtime, Sumanth Rincon MD, 1 drop at 04/28/17 2136     brimonidine (ALPHAGAN) 0.2 % ophthalmic solution 1 drop, 1 drop, Both Eyes, BID, Sumanth Rincon MD, 1 drop at 04/28/17 2143           Lab results:        Recent Labs   Lab Test  04/29/17   0534  04/28/17   1115  04/28/17   0523   04/27/17   0524   NA  135   --   138   --   140   POTASSIUM  4.0  4.9  3.3*   < >  3.2*   CHLORIDE  99   --   100   --   102   CARLO  8.8   --    8.8   --   8.8   CO2  32   --   33*   --   29   BUN  25   --   24   --   25   CR  0.99   --   1.01   --   0.92   GLC  157*   --   86   --   81    < > = values in this interval not displayed.     Recent Labs   Lab Test  04/29/17   0534  04/28/17   0523  04/27/17   0524   HGB  11.8  11.3*  11.6*   WBC  9.2  10.0  11.1*   PLT  237  193  194     Recent Labs   Lab Test  04/21/17 2035 04/21/17   1928  04/07/17   1110   INR  1.16*  Canceled, Test credited   Unsatisfactory specimen - hemolyzed   Notification of test cancellation was given to  ER via trackboard at 2020.SL    1.38*     Recent Labs   Lab Test  04/22/17   1252  04/22/17   0526  04/21/17 2035   TROPI  0.084*  0.142*  0.075*

## 2017-04-30 NOTE — PROGRESS NOTES
[facility]  Hospitalist Progress Note  Name: Rebecca Helms    MRN: [id number]  Provider:  yDlan Pavon MD  04/30/17    Initial presenting complaint/issue to hospital (Diagnosis): SOB.         Assessment and Plan:      Summary of Stay: Rebecca Helms is a 88 year old female admitted on 4/21/2017 with complex PMHx including severe valvular disease (moderate MR, moderate TR and mod-severe AS), hypertension, dyslipidemia, paroxsymal afib with hx of AVN ablation on chronic anticoagulation, sick sinus syndrome s/p ppm, chronic diastolic CHF and carotid artery stenosis who was admitted on 4/21/2017 with acute onset shortness of breath suspected secondary to flash pulmonary edema. She was initially requiring BiPAP and was admitted to the [facility] [place].      Note she was recently hospitalized here from 4/17/17 - 4/19/17 for evaluation of syncope. Workup included a repeat echocardiogram with findings as summarized above. Her medications were adjusted (Toprol XL dc'd and started on amlodipine 5mg daily). She had no s/sx of fluid overload during that stay.       Acute Hypoxic Respiratory Failure, most likely pulmonary hemmorhage ane less likely other co-morbidities including flash pulmonary edema, consolidative pneumonia in the setting of severe valvular disease   Echocardiogram on 4/17 showed EF 60-65%, 2+MR, 2+TR and mod AS as well as elevated RVSP c/w severe pulmonary hypertension.  She developed acute onset after she went out to dinner with friends and became acutely short of breath after returning home. Typically not O2 dependent. O2 sats were as low as 70s when EMS arrived. In ED, CXR showed some hazy densities bilaterally. CT chest was neg for PE, showed extensive ground glass infiltrate throughout the lungs and some consolidative changes in the lower lobes (L worse than R). ProBNP 1900. Trop 0.075. WBC 17.6. Procalcitonin 0.13 (low risk of systemic bacterial infection). Lactate 3.4. Placed on BIPAP in the ED. On  Vancomycin and Zosyn initially and now just zosyn given recent hospitalization. Initially placed on nitro gtt but this was quickly weaned while she was in the ED  -- now on comfort cares.      Elevated troponin dt demand ischemia:  Suspect demand ischemia in the setting of acute hypoxic respiratory failure.   -- serial troponins remained relatively flat (0.075 -> 0.142 -> 0.084)  --on comfort cares.         Paroxysmal Afib with hx of AV Node Ablation  Sick Sinus Syndrome s/p ppm:  Remains paced.   Anticoagulated with Eliquis -> held 4/23 dt reported hemoptysis,  Toprol XL dc'd during recent hospital stay, no issues with rate control thus far      Hypertension:  Meds adjusted during recent hospital stay -> Toprol XL dc'd and was started on amlodipine 10mg daily though I see this was on hold per [facility] clinic as of 4/20         Dyslipidemia:  Chronic and stable on statin.       Stage III CKD:  -- Baseline Cr seems to be 1.1-1.2, down to 0.92         Glaucoma:  Cont home eye gtts.      Pulmonary hemmorhage  -- had episode of hemoptysis on 4/22 PM - 4/23 AM  -- hgb initially trended down: 13.6 on 4/22 -> 11.3 on 4/23 but has since remained stable   -- now on comfort cares.      FEN: no IVFs, lytes stable, cardiac/low Na diet          Code Status: DNR/DNI               Interval History:        Obtunded.                  Physical Exam:      Last Vital Signs:      BP: 146/72   Heart Rate: 72 Resp: 18 SpO2: (!) 76 % O2 Device: Nasal cannula Oxygen Delivery: 2 LPM    Intake/Output Summary (Last 24 hours) at 04/30/17 1547  Last data filed at 04/29/17 1600   Gross per 24 hour   Intake                0 ml   Output                0 ml   Net                0 ml        Vitals:    04/24/17 0500 04/26/17 0637 04/27/17 0453 04/28/17 0400   Weight: 51.7 kg (113 lb 14.4 oz) 52.2 kg (115 lb 1.3 oz) 52.2 kg (115 lb 1.3 oz) 50.5 kg (111 lb 5.3 oz)    04/29/17 0600   Weight: 51.7 kg (114 lb)       Gen - obtunded.         Medications:       All current medications were reviewed.         Data:      All new lab and imaging data was reviewed.   Labs:  Recent labs and studies reviewed.   Recent Imaging:   No results found for this or any previous visit (from the past 24 hour(s)).

## 2017-04-30 NOTE — PLAN OF CARE
Problem: Goal Outcome Summary  Goal: Goal Outcome Summary  Outcome: No Change  Is mainly obtunded.  Nonverbal  Occasionally moans and comfort measures employed.  Daughter stayed all night.  RR gradually decreasing.  Unable to obtain an O2 sat.  No uo.Is paced at rate of 70.

## 2017-04-30 NOTE — PLAN OF CARE
Problem: Goal Outcome Summary  Goal: Goal Outcome Summary  Outcome: Declining  Pt. Was up to the BSC this AM and then was eating her breakfast in bed.    At 0915, MD here and noticed pt. Was having increased difficulty breathing and she stated she was going to die.   Pt. Because somnolent and apneic breathing.  Dtg. Called and then she and a niece arrived.   Pt. Has remained obtunded, apneic the rest of the day.   Oral care, turning and emotional support to family.   Comfort care measures. DNR/DNI

## 2017-04-30 NOTE — PROGRESS NOTES
Comfort care:  HFNC removed at 2130, on 2L NC.  Sats in 30s.  Family at bedside.  Morphine for pain and dyspnea.

## 2017-05-01 NOTE — PROGRESS NOTES
[facility]  Hospitalist Progress Note  Name: Rebecca Helms    MRN: [id number]    Initial presenting complaint/issue to hospital (Diagnosis): SOB.         Assessment and Plan:      Summary of Stay: Rebecca Helms is a 88 year old female admitted on 4/21/2017 with complex PMHx including severe valvular disease (moderate MR, moderate TR and mod-severe AS), hypertension, dyslipidemia, paroxsymal afib with hx of AVN ablation on chronic anticoagulation, sick sinus syndrome s/p ppm, chronic diastolic CHF and carotid artery stenosis who was admitted on 4/21/2017 with acute onset shortness of breath suspected secondary to flash pulmonary edema. She was initially requiring BiPAP and was admitted to the [facility] [place].    Note she was recently hospitalized here from 4/17/17 - 4/19/17 for evaluation of syncope. Workup included a repeat echocardiogram with findings as summarized above. Her medications were adjusted (Toprol XL dc'd and started on amlodipine 5mg daily). She had no s/sx of fluid overload during that stay.   Eliquis was on hold since 4/23 due to reported hemoptysis.      Acute Hypoxic Respiratory Failure, most likely pulmonary hemmorhage ane less likely other co-morbidities including flash pulmonary edema, consolidative pneumonia in the setting of severe valvular disease   Echocardiogram on 4/17 showed EF 60-65%, 2+MR, 2+TR and mod AS as well as elevated RVSP c/w severe pulmonary hypertension. She developed acute onset after she went out to dinner with friends and became acutely short of breath after returning home. Typically not O2 dependent. O2 sats were as low as 70s when EMS arrived. In ED, CXR showed some hazy densities bilaterally. CT chest was neg for PE, showed extensive ground glass infiltrate throughout the lungs and some consolidative changes in the lower lobes (L worse than R). ProBNP 1900. Trop 0.075. WBC 17.6. Procalcitonin 0.13 (low risk of systemic bacterial infection). Lactate 3.4. Placed on BIPAP in  the ED. Placed on Vancomycin and Zosyn initially.  She was also placed on nitro gtt but this was quickly weaned while she was in the ED. Elevated troponin suspect to be dt demand ischemia. Monitored with serial troponins and remained relatively flat  Suspect demand ischemia in the setting of acute hypoxic respiratory failure.     -- Transitioned to comfort cares.  -- Palliative consulted and input appreciated  --- medication modified by palliative and follow response        Code Status: DNR/DNI       Disposition: will monitor her for 24-48h.   Ok to transfer to the floor.          Interval History:      Patient seen and assessed. Constantly moaning. Family members at the bed side.                   Physical Exam:      Last Vital Signs:      BP: 119/56   Heart Rate: 70 Resp: 11 SpO2: (!) 61 % O2 Device: None (Room air)      Intake/Output Summary (Last 24 hours) at 04/30/17 1547  Last data filed at 04/29/17 1600   Gross per 24 hour   Intake                0 ml   Output                0 ml   Net                0 ml     I/O last 3 completed shifts:  In: 0   Out: 650 [Urine:650]  Vitals:    04/24/17 0500 04/26/17 0637 04/27/17 0453 04/28/17 0400   Weight: 51.7 kg (113 lb 14.4 oz) 52.2 kg (115 lb 1.3 oz) 52.2 kg (115 lb 1.3 oz) 50.5 kg (111 lb 5.3 oz)    04/29/17 0600   Weight: 51.7 kg (114 lb)       Gen - obtunded.         Medications:      All current medications were reviewed.         Data:      All new lab and imaging data was reviewed.   Labs:  Recent labs and studies reviewed.   Recent Imaging:   No results found for this or any previous visit (from the past 24 hour(s)).

## 2017-05-01 NOTE — PROGRESS NOTES
SP[facility]UAL [facility] SERVICES Progress Note  [facility]    F/U visit.   provided (anticipatory) grief support to pt's dtr who is keeping spivey at pt's bedside.  Pt's dtr shared stories about pt's life and illness, and said she is using wisdom from the book Being Mortal to guide end of life decisions for pt.  SH affirmed pt's dtrs spivey and decision-making, and also affirmed self-care for pt's dtr (including sleeping away from the hospital).   hears that pt's Zoroastrianism/pastors are involved.   also offered availability per need/request.                                                                                                                                           Fran Colmenares M.Div., [facility]  Staff   Pager [phone]

## 2017-05-01 NOTE — PROGRESS NOTES
Message from CV transitions nurse. Pt admitted and will be transitioning to comfort cares. [facility] appt's canceled, pt disenrolled from TELEMANAGEMENT. ZULLY Stafford

## 2017-05-01 NOTE — PROGRESS NOTES
X-cover note:    Called regarding agitation.  Patient on comfort cares.   Ordered Haldol prn. In addition, added PO morphine since IV access going bad.     Josh Eckert MD  Hospitalist

## 2017-05-01 NOTE — PLAN OF CARE
Problem: Goal Outcome Summary  Goal: Goal Outcome Summary  Outcome: Declining  Pt restless, morphine and thorazine helpful, full comfort care patient, no tele, no VSS, periods of apnea, no IV access at this time, palliative care following.  Continue to monitor.

## 2017-05-01 NOTE — PLAN OF CARE
Problem: Goal Outcome Summary  Goal: Goal Outcome Summary  Outcome: No Change  Pt. In with SOB / respiratory failure.  Comfort care measures began yesterday AM.   Oxygen was nasal cannula overnight and transitioned to room air at noon.  Sats. 40-60%  Apnea.   Frequent turning and oral care   Alternating MS and SL Ativan for comfort.  Noted to be more restless this afternoon and pulling at gown and sheets.   Daughter and niece in room most of the day.   Continue comfort care.

## 2017-05-01 NOTE — PROVIDER NOTIFICATION
Dr. Eckert notified of restlessness and agitation with moaning.  Bladder scanned for 578 and blue inserted per order.  Immediate reurn of 600 cc of cloudy  Slightly darker yuli urine.  Relief noted.

## 2017-05-01 NOTE — PROVIDER NOTIFICATION
MD Notification    Notified Person:  MD    Notified Persons Name: Ariadne Nair, palliative     Notification Date/Time: 1030    Notification Interaction:  Talked with Physician    Purpose of Notification: agitation, moaning continue    Orders Received: yes    Comments:

## 2017-05-01 NOTE — PROGRESS NOTES
[facility]    Palliative Care Progress Note    Rebecca Helms  MRN# [id number]  Date of Admission:  4/21/2017  Date of Service (when I saw the patient): 05/01/2017    Assessment & Plan   Rebecca Helms is a 88 year old female with PMH significant for severe valvular disease, HTN, HLD, paroxysmal a.fib with hx AVN ablation on chronic anticoagulation, sick sinus syndrome, chronic diastolic HF, and CAD who presents with acute onset SOB. She has been treated for acute on chronic respiratory failure, possibly 2/2 pulmonary hemorrhage vs flash pulmonary edema vs PNA. She acutely decompensated over the weekend and transitioned to comfort measures only. We are following for goals of care.     Symptoms/Recommendations  -Continue comfort measures only, orders adjusted to discontinue telemetry and pulse ox monitoring   -Treat air hunger with morphine high concentration 10-20mg every 1hr PRN (dose may require ongoing escalation for comfort today)  -Bedside fan   -No IV access is needed at this time   -Ativan caused increased restlessness, this has been discontinued   -Haldol 1-2mg SL every 6hrs PRN for agitation (this did not help her agitation today)  -Trial thorazine 25mg SL every 6hrs PRN for agitation    -Today, goal is to work on comfort. I anticipate time to be short, could be as soon as hours to days. Should she stabilize, I did talk to dtr about prospect of discharging from the hospital with hospice services. I do not anticipate this until mid-week at earliest     Support/Coping  -Spoke with daughter Lainey in person today   -Pt attends [facility] [facility]. Her  has been visiting for spiritual support     Decisional Support, Goals of Care, Counseling & Coordination  Decisional Capacity Intact?  -No  Health Care Directive on File?  -Yes  Code Status/Resuscitation Preferences?  -DNR/DNI    Discussion  Note plan of care change over weekend, now comfort measures only. I was called by bedside RN to help  assist with sx management, as pt is moaning and restless.     Visited pt this AM. She is moaning, restless, sitting up on elbows, pulling at clothes. I stayed in the room for much of the AM trying increasing doses of morphine, without great effect thus far. Haldol was largely ineffective early this AM. She had been previously receiving quite a bit of ativan, which I wonder exacerbated her agitation. Will now trial a dose of thorazine to see if that helps her restlessness.     Emotional support provided to daughter Lainey. Talked about what we may see at end of life, including breathing pattern changes. Talked about timing of death, which could be as soon as hours to days, but likely no longer than a couple weeks. Lainey understands the unpredictability of one's journey with death and dying. We talked at length about Rebecca's spiritual depth and her involvement at [place]. Lainey talked about how Rebecca had not completely worked through what her death would look like with God.     Should Rebecca survive the coming days and stabilize, we may talk about discharging from the hospital with hospice services. I anticipate this unlikely to happen, but Lainey understands this may be a future conversation in the coming days.     Spent a lot of time reviewing medications and options with bedside RN, Kayla.     Case reviewed with amy Kirby and Dr. Shaw.     Thank you for involving us in the care of this patient and family. We will continue to follow. Please do not hesitate to contact me with questions or concerns or the on-call provider for our team if evening or weekend.    Ariadne NICOLE, [facility]  Palliative Medicine   Pager [phone]    Attestation:  Total time on the floor involved in the patient's care: 60 minutes  Total time spent in counseling/care coordination: >50%    Interval History   Respiratory status worsened over weekend and decision was made to transition to comfort measures only. Dtr Lainey arrived in  town and present at bedside. Pt is more restless and agitated this AM, moaning in discomfort, pulling at clothes, propping up on elbows. No IV access. Received SL haldol without effect. Has been receiving quite a bit of ativan. Morphine dosing has been ineffective thus far.     Medications   Current Facility-Administered Medications Ordered in Epic   Medication Dose Route Frequency Last Rate Last Dose     haloperidol (HALDOL) 2 MG/ML (HIGH CONC) solution 1-2 mg  1-2 mg Oral Q6H PRN   2 mg at 05/01/17 0924     morphine sulfate HIGH CONCENTRATE (ROXANOL *CONCENTRATED*) 20 mg/mL (HIGH CONC) solution 10-20 mg  10-20 mg Sublingual Q1H PRN   10 mg at 05/01/17 1116     ondansetron (ZOFRAN-ODT) ODT tab 4 mg  4 mg Oral Q6H PRN        Or     ondansetron (ZOFRAN) injection 4 mg  4 mg Intravenous Q6H PRN         atropine 1 % ophthalmic solution 1-2 drop  1-2 drop Sublingual Q1H PRN         hypromellose-dextran (ARTIFICAL TEARS) ophthalmic solution 1-2 drop  1-2 drop Both Eyes Q8H PRN         timolol (TIMOPTIC) 0.5 % ophthalmic solution 1 drop  1 drop Both Eyes BID   1 drop at 04/30/17 2158     latanoprost (XALATAN) 0.005 % ophthalmic solution 1 drop  1 drop Both Eyes At Bedtime   1 drop at 04/30/17 1943     brimonidine (ALPHAGAN) 0.2 % ophthalmic solution 1 drop  1 drop Both Eyes BID   1 drop at 04/30/17 1943     No current [facility]-ordered outpatient prescriptions on file.       Physical Exam       BP: 119/56   Heart Rate: 70 Resp: 11 SpO2: (!) 61 % O2 Device: None (Room air)    Vitals:    04/27/17 0453 04/28/17 0400 04/29/17 0600   Weight: 52.2 kg (115 lb 1.3 oz) 50.5 kg (111 lb 5.3 oz) 51.7 kg (114 lb)     CONSTITUTIONAL: Chronically ill elderly woman seen lying in bed. She has periods of resting alternating with restlessness, pulling at clothing, moaning, propping up on elbows. Dtr at bedside.   RESPIRATORY: Respiratory effort appears fairly comfortable on RA, frequent periods of apnea noted   EXT: Warm, no mottling, no edema      Data   No results found for this or any previous visit (from the past 24 hour(s)).

## 2017-05-02 NOTE — PLAN OF CARE
Problem: Goal Outcome Summary  Goal: Goal Outcome Summary  Outcome: Declining  Pt. Has been restless and moaning at times. Morphine given PRN. Having periods of apnea. Comfort cares. Daughter at bedside.

## 2017-05-02 NOTE — PROGRESS NOTES
Pt  0535--writer entered room as pt was taking last breaths.  Family notified, donor referral line called.  0730--daughter currently at bedside.  Declined courtesy tray and  services.

## 2017-05-02 NOTE — PROGRESS NOTES
House MD note. Re: Pronouncement.    Patient pronounced dead at 05:35 hours today. No spontaneous respirations. No S1-S2.    Cipriano Torrez MD  5/2/2017  6:02 AM

## 2017-05-02 NOTE — DISCHARGE SUMMARY
INTERNAL MEDICINE DISCHARGE SUMMARY    Patient Name: Rebecca Helms  YOB: 1928   Medical Record Number: [id number]   Attending Provider: No att. providers found   Primary Care Provider: Ren Gipson  Admission Date: 4/21/2017  Discharge Date: 5/2/2017    Death summary    HOSPITAL COURSE  Summary of Stay: Rebecca Helms is a 88 year old female admitted on 4/21/2017 with complex PMHx including severe valvular disease (moderate MR, moderate TR and mod-severe AS), hypertension, dyslipidemia, paroxsymal afib with hx of AVN ablation on chronic anticoagulation, sick sinus syndrome s/p ppm, chronic diastolic CHF and carotid artery stenosis who was admitted on 4/21/2017 with acute onset shortness of breath suspected secondary to flash pulmonary edema. She was initially requiring BiPAP and was admitted to the [facility].    Note she was recently hospitalized here from 4/17/17 - 4/19/17 for evaluation of syncope. Workup included a repeat echocardiogram with findings as summarized above. Her medications were adjusted (Toprol XL dc'd and started on amlodipine 5mg daily). She had no s/sx of fluid overload during that stay.   Eliquis was on hold since 4/23 due to reported hemoptysis.      Acute Hypoxic Respiratory Failure, most likely pulmonary hemmorhage ane less likely other co-morbidities including flash pulmonary edema, consolidative pneumonia in the setting of severe valvular disease   Echocardiogram on 4/17 showed EF 60-65%, 2+MR, 2+TR and mod AS as well as elevated RVSP c/w severe pulmonary hypertension. She developed acute onset after she went out to dinner with friends and became acutely short of breath after returning home. Typically not O2 dependent. O2 sats were as low as 70s when EMS arrived. In ED, CXR showed some hazy densities bilaterally. CT chest was neg for PE, showed extensive ground glass infiltrate throughout the lungs and some consolidative changes in the lower lobes (L worse than R). ProBNP  1900. Trop 0.075. WBC 17.6. Procalcitonin 0.13 (low risk of systemic bacterial infection). Lactate 3.4. Placed on BIPAP in the ED. Placed on Vancomycin and Zosyn initially. She was also placed on nitro gtt but this was quickly weaned while she was in the ED. Elevated troponin suspect to be dt demand ischemia. Monitored with serial troponins and remained relatively flat  Suspect demand ischemia in the setting of acute hypoxic respiratory failure.      -- Transitioned to comfort cares.  -- Palliative consulted and followed.   Patient was pronounced  2017 at 5:35 AM by the house MD.         DISCHARGE MEDICATIONS  Discharge Medication List as of 2017 10:39 AM      CONTINUE these medications which have NOT CHANGED    Details   Furosemide (LASIX PO) Take 20 mg by mouth daily as needed (If weight great than 114 pounds) , Historical      amLODIPine (NORVASC) 10 MG tablet Take 1 tablet (10 mg) by mouth daily **ON HOLD AS OF 17 per [facility]**, Disp-30 tablet, R-0, Historical      lisinopril (PRINIVIL/ZESTRIL) 20 MG tablet Take 1 tablet (20 mg) by mouth daily, Disp-30 tablet, R-0, E-Prescribe      acetaminophen (TYLENOL ARTHRITIS PAIN) 650 MG CR tablet Take 1,300 mg by mouth 2 times daily , Historical      moxifloxacin (VIGAMOX) 0.5 % ophthalmic solution Place 1 drop Into the left eye daily as needed (eye infections) , Historical      apixaban ANTICOAGULANT (ELIQUIS) 2.5 MG tablet Take 2.5 mg by mouth 2 times daily , Disp-90 tablet, R-1, HistoricalDO NOT FILL.  XARELTO IS DISCONTINUED AND PT WILL CONTINUE WITH ELIQUIS. SHE HAS A 60 DAY SUPPLY AT HOME (ELIQUIS) THIS IS FOR DOCUMENTATION PURPOSES ONLY. SUELANGENBRUNNERRN      brimonidine (ALPHAGAN-P) 0.15 % ophthalmic solution Place 1 drop into both eyes 2 times daily , Historical      pravastatin (PRAVACHOL) 20 MG tablet Take 1 tablet (20 mg) by mouth daily, Disp-30 tablet, R-11, E-Prescribe      calcium-vitamin D (CALTRATE) 600-400 MG-UNIT per tablet  Take 1 tablet by mouth daily , Historical      latanoprost (XALATAN) 0.005 % ophthalmic solution Place 1 drop into both eyes At Bedtime, Historical      timolol (TIMOPTIC) 0.5 % ophthalmic solution Place 1 drop into both eyes 2 times daily, Historical      TRAMADOL HCL PO Take 50 mg by mouth At Bedtime Patient takes scheduled, Historical      multivitamin, therapeutic with minerals (MULTI-VITAMIN) TABS Take 1 tablet by mouth daily., Historical      order for DME Equipment being ordered: Walker Wheels () and Walker ()  Treatment Diagnosis: difficulty with gaitDisp-1 each, R-0, Local Print                DISCHARGE PROCEDURES   No discharge procedures on file.     Nolan Shaw MD  [facility]  Board certified Internal Medicine  Pager # [phone]    5/2/2017

## 2017-05-02 NOTE — PLAN OF CARE
Problem: Goal Outcome Summary  Goal: Goal Outcome Summary  Pt  at 05:35am. Family took all of pt's belongings. Pt left floor around 10:40am

## 2017-05-02 NOTE — PROGRESS NOTES
Granddaughter arrived approx 0000, at bedside sharing stories about her grandmother.  Continuing with comfort cares.  Morphine SL given x 2 so far this shift--apneic periods.  Oral cares done.

## 2019-04-30 NOTE — TELEPHONE ENCOUNTER
"TELEMANAGEMENT: Wt 122# today, up 2# from yesterday and now 4# above max wt parameter.  SOB and swelling reported on survey today.  Pt takes lasix 40mg daily. Pt has OV's with Dr. Monae and SULEMAN Zhong scheduled for tomorrow, 2/15.    Called and spoke with pt.  She reports she ate spaghetti last night and thinks that is why wt is up today.  She reports she has \"felt\" her breathing more the last couple of days, but denies SOB at this time and states feeling \"fairly good\" right now.  She also reports that her swelling is improving.  Reviewed with pt plan for lab and OVs tomorrow, 2/15.  She wrote down appt times and agrees with plan. Will continue to monitor pt's wt/sx with TELEMANAGEMENT.        " Bactrim Counseling:  I discussed with the patient the risks of sulfa antibiotics including but not limited to GI upset, allergic reaction, drug rash, diarrhea, dizziness, photosensitivity, and yeast infections.  Rarely, more serious reactions can occur including but not limited to aplastic anemia, agranulocytosis, methemoglobinemia, blood dyscrasias, liver or kidney failure, lung infiltrates or desquamative/blistering drug rashes.

## 2019-09-17 NOTE — ED PROVIDER NOTES
History     Chief Complaint:  Shortness of breath    RADHA Helms is an anticoagulated 88 year old female with a complex history, including CAD, atrial fibrillation, HTN, and HLD, who presents via EMS with shortness of breath. Per EMS, the patient had been feeling short of breath, and called the staff at her apartment. The staff then called EMS. Her oxygen saturation was 98% on the ambulance ride.     The patient reports that she has been experiencing worsening with shortness of breath and coughing over the past week. Earlier today, she was going to a birthday party and had to stop four times while walking down the hallway due to shortness of breath. Her shortness of breath is worst with exertion and while lying down. She also complains of a productive cough , which consists of a white mucus. In addition to her shortness of breath and cough, the patient has been experiencing increasing leg swelling over the past week. She has never had similar leg swelling before.    Cardiac Risk Factors:  CAD:    Pos  Hypertension:   Pos  Hyperlipidemia:  Pos  Diabetes:   Neg  Tobacco use:   Neg  Gender:   F  Age:    88  Familial Hx of CAD:  Pos    Allergies:  The patient has no known drug allergies.     Medications:    Metoprolol  Lisinopril  Vigamox  Eliquis   Brimonidine  Pravachol  Caltrate  Xalatan  Timoptic  Tramadol  Multivitamins    Past Medical History:    CAD  HTN  HLD  Osteoporosis  Sinus node dysfunction  Pulmonary HTN  Paroxysmal atrial fibrillation  Aortic stenosis  CHF  Lumbar radiculopathy     Past Surgical History:    Ovarian cystectomy  Tonsillectomy  Bilateral cataract  Dilation and curettage  Pacemaker implant    Family History:    MI    Social History:  Relationship status:   Tobacco Use: Negative  Alcohol Use: Positive  The patient presents via EMS.  The patient is retired.     Review of Systems   Respiratory: Positive for cough and shortness of breath.    Cardiovascular: Positive for leg  Paracentesis yesterday with removal of 8L. Creat 1.7, BS 300s. .  GI following, meds adjusted, plan OP workup for cirrhosis. Patient feels she is \"filling back up\". Fluid restriction ordered. PT/OT evals. Plans return home with family and [facility] at discharge.   Electronically signed by Kt Mccullough RN on 9/17/2019 at 12:12 PM "swelling.   All other systems reviewed and are negative.      Physical Exam   First Vitals:  BP: 157/94 mmHg  Temp: 98.5  F (36.9  C)  Temp src: Temporal  Pulse: 80  Resp: 98  Height: 154.9 cm (5' 1\")  Weight: 56.7 kg (125 lb)    Physical Exam  Constitutional: The patient is oriented to person, place, and time.   HENT:   Head: Atraumatic  Right Ear: Normal  Left Ear: Normal  Nose: Nose normal.   Mouth/Throat: Oropharynx is clear and moist. No erythema or exudate.   Eyes: EOM are normal. Pupils are equal, round, and reactive to light. No discharge. Conjunctival injection on left eye.  Neck: Normal range of motion. Neck supple.   Cardiovascular: Normal rate, no murmur gallops or rubs. Rhythm slightly irregular. Pacemaker in left chest Intact distal pulses.    Pulmonary/Chest: CTA bilaterally. Faint rales at both bases.  Abdominal: Soft. Non tender.  No masses   Musculoskeletal: No bony deformity. Normal range of motion. 2+pitting edema to knees bilaterally  Lymphadenopathy:     The patient has no cervical adenopathy.   Neurological: The patient is alert and oriented to person, place, and time. The patient has normal strength and normal reflexes. No cranial nerve deficit. Coordination normal. GCS 15  Skin: Skin is warm and dry. No rash noted. The patient is not diaphoretic.   Psychiatric: The patient has a normal mood and affect.      Emergency Department Course   ECG (22:14:02):  Indication: Shortness of breath  Rate 97 bpm. FL interval *. QRS duration 84. QT/QTc 368/467. P-R-T axes 72.   Interpretation: Suspect unspecified pacemaker failure. Atrial fibrillation with occasional ventricular-paced complexes. Low voltage QRS. Cannot rule out anterior infarct, age undetermined.   Agree with computer interpretation.   No significant change compared to EKG dated 12/23/44.   Interpreted at 2220 by Dr. Landry.      Imaging:  Radiographic findings were communicated with the patient who voiced understanding of the " findings.    Chest XR 2 Views, per radiology:  A dual-lead pacemaker module implanted over the left chest with the leads in the right atrium and right ventricle is again noted without identifiable change.    There is no severe cardiomegaly. There are new bilateral pleural effusions. There are no definite airspace opacities to suggest pneumonia. There is no pneumothorax.    Laboratory:  CBC: WBC 9.3, HGB 13.7,   CMP: Glucose 111 (H), GFR (L), Creatinine 1.27 (H), o/w WNL  BNP: 3778 (H)    Interventions:  2309: Lasix, 40 mg, IV    Emergency Department Course:  Nursing notes and vitals reviewed.  I performed an exam of the patient as documented above.  The above workup was undertaken.  1112: I discussed the patient with Dr. Cannon of the hospitalist service.    I rechecked the patient and discussed results.    Findings and plan explained to the Patient who consents to admission. Discussed the patient with Dr. Cannon, who will admit the patient to a Med bed for further monitoring, evaluation, and treatment.      Impression & Plan      Medical Decision Making:  Rebecca Helms is a 88 year old female who presents with increased difficulty breathing over the past week, and worsening tonight. Based on history and exam, I suspect she is experiencing exacerbation of underlying diastolic heart failure. She has new pleural effusions on x-ray, rales on exam, and significant increased peripheral edema. With ambulation she became quite hypoxic, with O2 sats dipping into the low 80's. In light of this, I feel admission is warranted for diuresis and further evaluation. I spoke with Dr. Cannon of the hospitalist service, and the patient will be admitted for further evaluation and treatment. She was given 40 mg of IV Lasix here in the ED.     Diagnosis:    ICD-10-CM   1. Acute diastolic congestive heart failure (H) I50.31   2. Hypoxia R09.02     Disposition:  .Admit to a Med bed under the care of Dr. Cannon.     I  Kameron Stoddard, am serving as a scribe on 1/20/2017 at 10:02 PM to personally document services performed by Anthony Landry MD, based on my observations and the provider's statements to me.     EMERGENCY DEPARTMENT        Anthony Landry MD  01/21/17 0408

## 2019-11-16 NOTE — MR AVS SNAPSHOT
After Visit Summary   3/9/2017    Rebecca Helms    MRN: [id number]           Patient Information     Date Of Birth          6/18/1928        Visit Information        Provider Department      3/9/2017 12:30 PM Lisbet Chakraborty PA-C [facility] [place]        Today's Diagnoses     Chronic diastolic CHF (congestive heart failure) (H)    -  1    Paroxysmal a-fib (H)        Persistent atrial fibrillation (H)        Sinus node dysfunction (H)          Care Instructions    1. Pacemaker check today showed that you're usually in normal rhythm BUT you have continued to have episodes of atrial fibrillation (funny rhythm from top of heart) since starting amiodarone.  When you have these funny heart rhythms, your heart rate goes very fast sometimes.    2. I spoke with Dr. Monae about the fact you are still having some funny heart rhythms despite this medication.  He has recommended we CONTINUE amiodarone 200 mg DAILY (no longer taking 2 x per day).    3. [facility] has reset the counters on the device and made a few adjustments to see if this improves how you're feeling.  You will get your pacemaker re-checked when you see Dr. Monae on 3/20.  If still having AFib, will talk about going ahead with AV Node Ablation.      4. If you think that you're doing WORSE after these changes, CALL US!  [phone]    5. I'll show Maday your blood work, but everything looks good to me!            Follow-ups after your visit        Your next 10 appointments already scheduled     Mar 09, 2017  2:00 PM CST   Remote PPM Check with MADRID TECH1   [facility] ([facility])    [address] [place] [id number]   [phone]           This appointment is for a remote check of your pacemaker.  This is not an appointment at the office.            Mar 20, 2017  1:15 PM CDT   [facility] EP RETURN with Praful Monae MD    [facility] ([facility])    [address]  [place] [address]   [phone]            Mar 20, 2017  2:00 PM CDT   Pacemaker Check with MADRID DCR2   [facility] ([facility])    [address]  [place] [address]   [phone]            Felix 15, 2017  4:45 PM CDT   Remote PPM Check with MADRID TECH1   [facility] ([facility])    [address]  [place] [address]   [phone]           This appointment is for a remote check of your pacemaker.  This is not an appointment at the office.              Who to contact     If you have questions or need follow up information about today's clinic visit or your schedule please contact [facility] directly at [phone].  Normal or non-critical lab and imaging results will be communicated to you by Triporatihart, letter or phone within 4 business days after the clinic has received the results. If you do not hear from us within 7 days, please contact the clinic through Linio or phone. If you have a critical or abnormal lab result, we will notify you by phone as soon as possible.  Submit refill requests through Linio or call your pharmacy and they will forward the refill request to us. Please allow 3 business days for your refill to be completed.          Additional Information About Your Visit        Linio Information     Linio gives you secure access to your electronic health record. If you see a primary care provider, you can also send messages to your care team and make appointments. If you have questions, please call your primary care clinic.  If you do not have a primary care provider, please call [phone] and they will assist you.        Care EveryWhere ID     This is your Care EveryWhere ID. This could be used by other  "organizations to access your [facility] medical records  [id number]        Your Vitals Were     Pulse Height BMI (Body Mass Index)             60 1.549 m (5' 1\") 22.5 kg/m2          Blood Pressure from Last 3 Encounters:   03/09/17 112/70   02/15/17 137/89   02/15/17 137/89    Weight from Last 3 Encounters:   03/09/17 54 kg (119 lb 1.6 oz)   02/15/17 56.2 kg (124 lb)   02/15/17 56.6 kg (124 lb 11.2 oz)              We Performed the Following     EKG 12-lead complete w/read - Clinics (performed today)     EKG 12-lead complete w/read - Clinics (to be scheduled)     Follow-Up with Cardiac Advanced Practice Provider          Today's Medication Changes          These changes are accurate as of: 3/9/17  1:08 PM.  If you have any questions, ask your nurse or doctor.               These medicines have changed or have updated prescriptions.        Dose/Directions    amiodarone 200 MG tablet   Commonly known as:  PACERONE/CODARONE   This may have changed:  when to take this   Used for:  Persistent atrial fibrillation (H)   Changed by:  Lisbet Chakraborty PA-C        Dose:  200 mg   Take 1 tablet (200 mg) by mouth daily   Refills:  0            Where to get your medicines      Some of these will need a paper prescription and others can be bought over the counter.  Ask your nurse if you have questions.     You don't need a prescription for these medications     amiodarone 200 MG tablet                Primary Care Provider Office Phone # Fax #    Ren Gipson [phone]       [facility] MANAG[facility] [address]  [address]  [facility] [address]        Thank you!     Thank you for choosing [facility] [place]  for your care. Our goal is always to provide you with excellent care. Hearing back from our patients is one way we can continue to improve our services. Please take a few minutes to complete the written survey that you may receive in the mail after your visit with " us. Thank you!             Your Updated Medication List - Protect others around you: Learn how to safely use, store and throw away your medicines at www.disposemymeds.org.          This list is accurate as of: 3/9/17  1:08 PM.  Always use your most recent med list.                   Brand Name Dispense Instructions for use    amiodarone 200 MG tablet    PACERONE/CODARONE     Take 1 tablet (200 mg) by mouth daily       apixaban ANTICOAGULANT 2.5 MG tablet    ELIQUIS    90 tablet    Take 2.5 mg by mouth 2 times daily       brimonidine 0.15 % ophthalmic solution    ALPHAGAN-P     Place 1 drop into both eyes 2 times daily       calcium-vitamin D 600-400 MG-UNIT per tablet    CALTRATE     Take 1 tablet by mouth daily       furosemide 40 MG tablet    LASIX    30 tablet    Take 1 tablet (40 mg) by mouth daily       latanoprost 0.005 % ophthalmic solution    XALATAN     Place 1 drop into both eyes At Bedtime       lisinopril 10 MG tablet    PRINIVIL/ZESTRIL     Take 1 tablet (10 mg) by mouth daily at night       metoprolol 25 MG 24 hr tablet    TOPROL-XL    180 tablet    Take 3 tablets (75 mg) by mouth daily       moxifloxacin 0.5 % ophthalmic solution    VIGAMOX     Place 1 drop Into the left eye daily as needed (eye infections)       Multi-vitamin Tabs tablet      Take 1 tablet by mouth daily.       order for DME     1 each    Equipment being ordered: Walker Wheels () and Walker () Treatment Diagnosis: difficulty with gait       pravastatin 20 MG tablet    PRAVACHOL    30 tablet    Take 1 tablet (20 mg) by mouth daily       timolol 0.5 % ophthalmic solution    TIMOPTIC     Place 1 drop into both eyes 2 times daily       TRAMADOL HCL PO      Take 50 mg by mouth At Bedtime Patient takes scheduled       TYLENOL ARTHRITIS PAIN 650 MG CR tablet   Generic drug:  acetaminophen      Take 650 mg by mouth daily          To get better and follow your care plan as instructed.

## 2019-11-26 NOTE — ADDENDUM NOTE
Addended by: LESA SALES on: 2/8/2017 01:40 PM     Modules accepted: Orders     Open Small Bowel Resection, Cystoscopy Ureteral Catheters

## 2021-02-12 NOTE — PROGRESS NOTES
Decision to treat was made based on today's clinical findings. [facility]  Patient is currently open to home care services with [facility].  The patient is currently receiving RN services.  [facility]  and team have been notified of patient admission.  [facility] liaison will continue to follow patient during stay.  If appropriate provide orders to resume home care at time of discharge.

## 2022-08-23 NOTE — TELEPHONE ENCOUNTER
IV dilaudid and IV valium administered for pain and anxiety overnight. Tolerating FL diet. Hypoglycemic overnight - Dr Joanna Caceres notified. D5 infusing. K replaced. Con't IV Diflucan and IV Zosyn. LIBERTY drains maintained. Purewick in place. Patient encouraged to get up in chair in AM. Monitor Na levels. Problem: Patient Centered Care  Goal: Patient preferences are identified and integrated in the patient's plan of care  Description: Interventions:  - What would you like us to know as we care for you?  Hallie Dexter lives home alone  - Provide timely, complete, and accurate information to patient/family  - Incorporate patient and family knowledge, values, beliefs, and cultural backgrounds into the planning and delivery of care  - Encourage patient/family to participate in care and decision-making at the level they choose  - Honor patient and family perspectives and choices  Outcome: Progressing     Problem: RESPIRATORY - ADULT  Goal: Achieves optimal ventilation and oxygenation  Description: INTERVENTIONS:  - Assess for changes in respiratory status  - Assess for changes in mentation and behavior  - Position to facilitate oxygenation and minimize respiratory effort  - Oxygen supplementation based on oxygen saturation or ABGs  - Provide Smoking Cessation handout, if applicable  - Encourage broncho-pulmonary hygiene including cough, deep breathe, Incentive Spirometry  - Assess the need for suctioning and perform as needed  - Assess and instruct to report SOB or any respiratory difficulty  - Respiratory Therapy support as indicated  - Manage/alleviate anxiety  - Monitor for signs/symptoms of CO2 retention  Outcome: Progressing     Problem: METABOLIC/FLUID AND ELECTROLYTES - ADULT  Goal: Electrolytes maintained within normal limits  Description: INTERVENTIONS:  - Monitor labs and rhythm and assess patient for signs and symptoms of electrolyte imbalances  - Administer electrolyte replacement as ordered  - Monitor response to TELEMANAGEMENT: Wt 122#, same as yesterday, above wt parameters and no symptoms reported. Pt taking Lasix 40mg daily. Called pt, she states she is feeling pretty good. Pt states she has been eating better. Pt states her swelling is actually improving, her legs are getting smaller. Pt states for the last 5 days she has not had any episodes of SOB. She is very happy about this. Of note, pt last saw [facility] and Dr. Monae on 2/15, at which time pt was started on Amiodarone, in addition to her Metoprolol. Next OV 3/9 with Hola and a BMP. Route to [facility] for review. ZULLY Stafford      electrolyte replacements, including rhythm and repeat lab results as appropriate  - Fluid restriction as ordered  - Instruct patient on fluid and nutrition restrictions as appropriate  Outcome: Progressing     Problem: SKIN/TISSUE INTEGRITY - ADULT  Goal: Incision(s), wounds(s) or drain site(s) healing without S/S of infection  Description: INTERVENTIONS:  - Assess and document risk factors for pressure ulcer development  - Assess and document skin integrity  - Assess and document dressing/incision, wound bed, drain sites and surrounding tissue  - Implement wound care per orders  - Initiate isolation precautions as appropriate  - Initiate Pressure Ulcer prevention bundle as indicated  Outcome: Progressing

## 2023-02-25 NOTE — TELEPHONE ENCOUNTER
Device check does not correlate with sx.  Wt is stable, unclear cause.  ? If anginal equivalent, vavlular or potentially pulmonary.  Please have her get a complete echo before visit with Dr. White now that we think she is euvolemic.  No changes in meds at this time.    Maday Diaz PA-C 1/30/2017 4:04 PM           Yes

## (undated) RX ORDER — FENTANYL CITRATE 50 UG/ML
INJECTION, SOLUTION INTRAMUSCULAR; INTRAVENOUS
Status: DISPENSED
Start: 2017-01-01

## (undated) RX ORDER — ACETAMINOPHEN 325 MG/1
TABLET ORAL
Status: DISPENSED
Start: 2017-01-01

## (undated) RX ORDER — HYDRALAZINE HYDROCHLORIDE 20 MG/ML
INJECTION INTRAMUSCULAR; INTRAVENOUS
Status: DISPENSED
Start: 2017-01-01

## (undated) RX ORDER — HEPARIN SODIUM 1000 [USP'U]/ML
INJECTION, SOLUTION INTRAVENOUS; SUBCUTANEOUS
Status: DISPENSED
Start: 2017-01-01